# Patient Record
Sex: FEMALE | Race: WHITE | NOT HISPANIC OR LATINO | Employment: OTHER | URBAN - METROPOLITAN AREA
[De-identification: names, ages, dates, MRNs, and addresses within clinical notes are randomized per-mention and may not be internally consistent; named-entity substitution may affect disease eponyms.]

---

## 2017-01-03 ENCOUNTER — TELEPHONE (OUTPATIENT)
Dept: FAMILY MEDICINE CLINIC | Facility: CLINIC | Age: 57
End: 2017-01-03

## 2017-01-04 ENCOUNTER — HOSPITAL ENCOUNTER (EMERGENCY)
Facility: HOSPITAL | Age: 57
Discharge: HOME | End: 2017-01-04
Attending: EMERGENCY MEDICINE | Admitting: EMERGENCY MEDICINE

## 2017-01-04 ENCOUNTER — APPOINTMENT (OUTPATIENT)
Dept: GENERAL RADIOLOGY | Facility: HOSPITAL | Age: 57
End: 2017-01-04

## 2017-01-04 VITALS
BODY MASS INDEX: 37.21 KG/M2 | RESPIRATION RATE: 17 BRPM | SYSTOLIC BLOOD PRESSURE: 131 MMHG | TEMPERATURE: 98.3 F | OXYGEN SATURATION: 94 % | HEIGHT: 63 IN | DIASTOLIC BLOOD PRESSURE: 78 MMHG | HEART RATE: 70 BPM | WEIGHT: 210 LBS

## 2017-01-04 DIAGNOSIS — R07.89 ATYPICAL CHEST PAIN: Primary | ICD-10-CM

## 2017-01-04 DIAGNOSIS — K21.9 GASTROESOPHAGEAL REFLUX DISEASE, ESOPHAGITIS PRESENCE NOT SPECIFIED: ICD-10-CM

## 2017-01-04 DIAGNOSIS — E78.5 HYPERLIPIDEMIA, UNSPECIFIED HYPERLIPIDEMIA TYPE: ICD-10-CM

## 2017-01-04 DIAGNOSIS — R73.9 HYPERGLYCEMIA: Primary | ICD-10-CM

## 2017-01-04 LAB
ALBUMIN SERPL-MCNC: 4.6 G/DL (ref 3.5–5.2)
ALBUMIN/GLOB SERPL: 1.8 G/DL
ALP SERPL-CCNC: 84 U/L (ref 39–117)
ALT SERPL W P-5'-P-CCNC: 22 U/L (ref 1–33)
ANION GAP SERPL CALCULATED.3IONS-SCNC: 13.6 MMOL/L
AST SERPL-CCNC: 20 U/L (ref 1–32)
BASOPHILS # BLD AUTO: 0.04 10*3/MM3 (ref 0–0.2)
BASOPHILS NFR BLD AUTO: 0.5 % (ref 0–1.5)
BILIRUB SERPL-MCNC: 0.4 MG/DL (ref 0.1–1.2)
BUN BLD-MCNC: 17 MG/DL (ref 6–20)
BUN/CREAT SERPL: 18.1 (ref 7–25)
CALCIUM SPEC-SCNC: 9.6 MG/DL (ref 8.6–10.5)
CHLORIDE SERPL-SCNC: 100 MMOL/L (ref 98–107)
CO2 SERPL-SCNC: 26.4 MMOL/L (ref 22–29)
CREAT BLD-MCNC: 0.94 MG/DL (ref 0.57–1)
DEPRECATED RDW RBC AUTO: 44 FL (ref 37–54)
EOSINOPHIL # BLD AUTO: 0.22 10*3/MM3 (ref 0–0.7)
EOSINOPHIL NFR BLD AUTO: 3 % (ref 0.3–6.2)
ERYTHROCYTE [DISTWIDTH] IN BLOOD BY AUTOMATED COUNT: 13.4 % (ref 11.7–13)
GFR SERPL CREATININE-BSD FRML MDRD: 62 ML/MIN/1.73
GLOBULIN UR ELPH-MCNC: 2.5 GM/DL
GLUCOSE BLD-MCNC: 99 MG/DL (ref 65–99)
HCT VFR BLD AUTO: 48.8 % (ref 35.6–45.5)
HGB BLD-MCNC: 16.3 G/DL (ref 11.9–15.5)
IMM GRANULOCYTES # BLD: 0.02 10*3/MM3 (ref 0–0.03)
IMM GRANULOCYTES NFR BLD: 0.3 % (ref 0–0.5)
LYMPHOCYTES # BLD AUTO: 2.54 10*3/MM3 (ref 0.9–4.8)
LYMPHOCYTES NFR BLD AUTO: 34.2 % (ref 19.6–45.3)
MCH RBC QN AUTO: 30.2 PG (ref 26.9–32)
MCHC RBC AUTO-ENTMCNC: 33.4 G/DL (ref 32.4–36.3)
MCV RBC AUTO: 90.5 FL (ref 80.5–98.2)
MONOCYTES # BLD AUTO: 0.54 10*3/MM3 (ref 0.2–1.2)
MONOCYTES NFR BLD AUTO: 7.3 % (ref 5–12)
NEUTROPHILS # BLD AUTO: 4.06 10*3/MM3 (ref 1.9–8.1)
NEUTROPHILS NFR BLD AUTO: 54.7 % (ref 42.7–76)
PLATELET # BLD AUTO: 265 10*3/MM3 (ref 140–500)
PMV BLD AUTO: 11 FL (ref 6–12)
POTASSIUM BLD-SCNC: 4.3 MMOL/L (ref 3.5–5.2)
PROT SERPL-MCNC: 7.1 G/DL (ref 6–8.5)
RBC # BLD AUTO: 5.39 10*6/MM3 (ref 3.9–5.2)
SODIUM BLD-SCNC: 140 MMOL/L (ref 136–145)
TROPONIN T SERPL-MCNC: <0.01 NG/ML (ref 0–0.03)
WBC NRBC COR # BLD: 7.42 10*3/MM3 (ref 4.5–10.7)

## 2017-01-04 PROCEDURE — 93010 ELECTROCARDIOGRAM REPORT: CPT | Performed by: INTERNAL MEDICINE

## 2017-01-04 PROCEDURE — 99284 EMERGENCY DEPT VISIT MOD MDM: CPT

## 2017-01-04 PROCEDURE — 71020 HC CHEST PA AND LATERAL: CPT

## 2017-01-04 PROCEDURE — 93005 ELECTROCARDIOGRAM TRACING: CPT

## 2017-01-04 PROCEDURE — 84484 ASSAY OF TROPONIN QUANT: CPT | Performed by: EMERGENCY MEDICINE

## 2017-01-04 PROCEDURE — 80053 COMPREHEN METABOLIC PANEL: CPT | Performed by: EMERGENCY MEDICINE

## 2017-01-04 PROCEDURE — 85025 COMPLETE CBC W/AUTO DIFF WBC: CPT | Performed by: EMERGENCY MEDICINE

## 2017-01-04 RX ORDER — SODIUM CHLORIDE 0.9 % (FLUSH) 0.9 %
10 SYRINGE (ML) INJECTION AS NEEDED
Status: DISCONTINUED | OUTPATIENT
Start: 2017-01-04 | End: 2017-01-05 | Stop reason: HOSPADM

## 2017-01-04 RX ORDER — OMEPRAZOLE 40 MG/1
40 CAPSULE, DELAYED RELEASE ORAL DAILY
Qty: 30 CAPSULE | Refills: 0 | Status: SHIPPED | OUTPATIENT
Start: 2017-01-04 | End: 2017-04-18 | Stop reason: SDUPTHER

## 2017-01-04 RX ORDER — ASPIRIN 325 MG
325 TABLET ORAL ONCE
Status: DISCONTINUED | OUTPATIENT
Start: 2017-01-04 | End: 2017-01-04

## 2017-01-04 NOTE — TELEPHONE ENCOUNTER
Looked through chart, had glucose borderline at 107 and can screen for diabetes with diagnosis of hyperglycemia.  The a1c isn't going to relate to her feet being cold necessarily.  If prediabetic or diabetic can develop neuropathies, but usually burning, tingling and/or numbness.  She is also due for cmp, lipid profile diagnosis dyslipidemia.

## 2017-01-04 NOTE — TELEPHONE ENCOUNTER
Labs ordered. Pt informed to come in for nurse visit to have drawn. They have to be sent to [facility].

## 2017-01-05 LAB
HOLD SPECIMEN: NORMAL
WHOLE BLOOD HOLD SPECIMEN: NORMAL

## 2017-01-05 NOTE — ED PROVIDER NOTES
EMERGENCY DEPARTMENT ENCOUNTER    CHIEF COMPLAINT  Chief Complaint: CP  History given by: patient  History limited by: nothing  Room Number: 28/28  PMD: Carrillo Drake MD  GI- Dr. Peraza    HPI:  Pt is a 56 y.o. female who presents complaining of intermittent upper chest discomfort for the last 2-3 days. Pt states that the discomfort radiates up her neck into her right ear. Pt states that the discomfort is worse with breathing or swallowing. Pt also complains of trouble swallowing, SOB, cold feet and an intermittent hoarse voice but denies fever.     Duration:  2-3 days  Onset: gradual  Timing: intermittent  Location: upper chest  Radiation: right ear  Quality: tightness  Intensity/Severity: moderate  Progression: worsening  Associated Symptoms: trouble swallowing, SOB, hoarse voice  Aggravating Factors: breathing, swallowing  Alleviating Factors: none  Previous Episodes: none  Treatment before arrival: none    PAST MEDICAL HISTORY  Active Ambulatory Problems     Diagnosis Date Noted   • Fibromyalgia 04/22/2016   • Depression 04/22/2016   • Acquired hypothyroidism 04/22/2016   • Hyperlipidemia 04/22/2016   • Vitamin D deficiency 04/22/2016   • Antinuclear factor positive 08/28/2014   • Gait instability 08/28/2014   • Disorder of lipid metabolism 08/28/2014   • Pain 06/06/2014   • Autonomic neuropathy 05/16/2016   • Intractable migraine with aura without status migrainosus 05/16/2016   • Small fiber neuropathy 06/28/2016   • Chronic bilateral low back pain without sciatica 12/08/2016   • Chronic right shoulder pain 12/08/2016     Resolved Ambulatory Problems     Diagnosis Date Noted   • No Resolved Ambulatory Problems     Past Medical History   Diagnosis Date   • Allergic    • Anemia    • Anxiety    • Arthritis    • Cancer    • Eating disorder    • Fibromyalgia, primary    • GERD (gastroesophageal reflux disease)    • Headache    • Hypothyroidism    • Low back pain    • Obesity    • Peptic ulceration    •  Peripheral neuropathy    • Tremor    • Urinary tract infection    • Visual impairment        PAST SURGICAL HISTORY  Past Surgical History   Procedure Laterality Date   • Cervical conization     • Dilatation and curettage         FAMILY HISTORY  Family History   Problem Relation Age of Onset   • Lymphoma Mother    • Depression Mother    • Cancer Father    • Heart attack Father    • Psoriasis Father    • Alcohol abuse Father    • Aneurysm Brother    • COPD Brother    • Heart disease Brother    • Heart attack Brother    • Cancer Maternal Aunt    • Cancer Maternal Uncle    • Cancer Paternal Aunt    • Heart disease Paternal Aunt    • Cancer Paternal Uncle    • Heart disease Paternal Uncle    • Aneurysm Brother    • Lung disease Brother    • Alcohol abuse Brother    • ADD / ADHD Son        SOCIAL HISTORY  Social History     Social History   • Marital status:      Spouse name: N/A   • Number of children: N/A   • Years of education: N/A     Occupational History   • Not on file.     Social History Main Topics   • Smoking status: Former Smoker   • Smokeless tobacco: Never Used   • Alcohol use Yes      Comment: OCCASIONAL   • Drug use: No   • Sexual activity: Not on file     Other Topics Concern   • Not on file     Social History Narrative       ALLERGIES  Ampicillin and Flour    REVIEW OF SYSTEMS  Review of Systems   Constitutional: Negative for fever.   HENT: Positive for trouble swallowing and voice change (hoarse, intermittent). Negative for sore throat.    Eyes: Negative.    Respiratory: Positive for shortness of breath. Negative for cough.    Cardiovascular: Positive for chest pain.   Gastrointestinal: Negative for abdominal pain, diarrhea and vomiting.   Genitourinary: Negative for dysuria.   Musculoskeletal: Negative for neck pain.        Feet are cold   Skin: Negative for rash.   Allergic/Immunologic: Negative.    Neurological: Negative for weakness, numbness and headaches.   Hematological: Negative.     Psychiatric/Behavioral: Negative.    All other systems reviewed and are negative.      PHYSICAL EXAM  ED Triage Vitals   Temp Heart Rate Resp BP SpO2   01/04/17 2059 01/04/17 2059 01/04/17 2059 01/04/17 2113 01/04/17 2059   98.3 °F (36.8 °C) 85 18 126/94 94 %      Temp src Heart Rate Source Patient Position BP Location FiO2 (%)   01/04/17 2059 01/04/17 2059 -- -- --   Tympanic Monitor          Physical Exam   Constitutional: She is oriented to person, place, and time and well-developed, well-nourished, and in no distress. No distress.   HENT:   Head: Normocephalic and atraumatic.   Right Ear: Tympanic membrane normal.   Left Ear: Tympanic membrane normal.   Mouth/Throat: Posterior oropharyngeal erythema (mild) present.   Eyes: EOM are normal. Pupils are equal, round, and reactive to light.   Neck: Normal range of motion. Neck supple.   No crepitus   Cardiovascular: Normal rate, regular rhythm and normal heart sounds.    Pulses:       Dorsalis pedis pulses are 2+ on the right side, and 2+ on the left side.   Pulmonary/Chest: Effort normal and breath sounds normal. No respiratory distress.   Abdominal: Soft. There is no tenderness. There is no rebound and no guarding.   Musculoskeletal: Normal range of motion. She exhibits no edema.   Lymphadenopathy:     She has no cervical adenopathy.   Neurological: She is alert and oriented to person, place, and time. She has normal sensation and normal strength.   Skin: Skin is warm and dry. No rash noted.   Psychiatric: Mood and affect normal.   Nursing note and vitals reviewed.      LAB RESULTS  Lab Results (last 24 hours)     Procedure Component Value Units Date/Time    CBC & Differential [[id number]] Collected:  01/04/17 2122    Specimen:  Blood Updated:  01/04/17 2133    Narrative:       The following orders were created for panel order CBC & Differential.  Procedure                               Abnormality         Status                     ---------                                -----------         ------                     CBC Auto Differential[[id number]]         Abnormal            Final result                 Please view results for these tests on the individual orders.    Comprehensive Metabolic Panel [[id number]] Collected:  01/04/17 2122    Specimen:  Blood Updated:  01/04/17 2156     Glucose 99 mg/dL      BUN 17 mg/dL      Creatinine 0.94 mg/dL      Sodium 140 mmol/L      Potassium 4.3 mmol/L      Chloride 100 mmol/L      CO2 26.4 mmol/L      Calcium 9.6 mg/dL      Total Protein 7.1 g/dL      Albumin 4.60 g/dL      ALT (SGPT) 22 U/L      AST (SGOT) 20 U/L      Alkaline Phosphatase 84 U/L      Total Bilirubin 0.4 mg/dL      eGFR Non African Amer 62 mL/min/1.73      Globulin 2.5 gm/dL      A/G Ratio 1.8 g/dL      BUN/Creatinine Ratio 18.1      Anion Gap 13.6 mmol/L     Troponin [[id number]]  (Normal) Collected:  01/04/17 2122    Specimen:  Blood Updated:  01/04/17 2156     Troponin T <0.010 ng/mL     Narrative:       Troponin T Reference Ranges:  Less than 0.03 ng/mL:    Negative for AMI  0.03 to 0.09 ng/mL:      Indeterminant for AMI  Greater than 0.09 ng/mL: Positive for AMI    CBC Auto Differential [[id number]]  (Abnormal) Collected:  01/04/17 2122    Specimen:  Blood Updated:  01/04/17 2133     WBC 7.42 10*3/mm3      RBC 5.39 (H) 10*6/mm3      Hemoglobin 16.3 (H) g/dL      Hematocrit 48.8 (H) %      MCV 90.5 fL      MCH 30.2 pg      MCHC 33.4 g/dL      RDW 13.4 (H) %      RDW-SD 44.0 fl      MPV 11.0 fL      Platelets 265 10*3/mm3      Neutrophil % 54.7 %      Lymphocyte % 34.2 %      Monocyte % 7.3 %      Eosinophil % 3.0 %      Basophil % 0.5 %      Immature Grans % 0.3 %      Neutrophils, Absolute 4.06 10*3/mm3      Lymphocytes, Absolute 2.54 10*3/mm3      Monocytes, Absolute 0.54 10*3/mm3      Eosinophils, Absolute 0.22 10*3/mm3      Basophils, Absolute 0.04 10*3/mm3      Immature Grans, Absolute 0.02 10*3/mm3           I ordered the above labs and reviewed the  results    RADIOLOGY  XR Chest 2 View   Preliminary Result   No acute findings.                  2203- Reviewed pt's CXR, which shows nothing acute. Independently viewed by me. Interpreted by radiologist. Discussed with Dr. Diamond.    I ordered the above noted radiological studies. Interpreted by radiologist. Discussed with radiologist (Dr. Diamond). Reviewed by me in PACS.       PROCEDURES  Procedures  EKG           EKG time: 2103  Rhythm/Rate: NSR, 70  P waves and HI: normal  QRS, axis: normal   ST and T waves: normal     Interpreted Contemporaneously by me, independently viewed  No old for comparison      PROGRESS AND CONSULTS  ED Course     2242- Notified pt of her unremarkable labs, imaging and EKG. D/w pt that her history sounds like she will need an EGD to r/o esophagitis. Pt states that she has not had a prescription for omeprazole recently. Discussed the plan to discharge the pt home with a prescription for omeprazole and recommended that she follow up with GI. Pt agrees with the plan and all questions were addressed.    10:44 PM  Latest vital signs   BP- 126/94 HR- 85 Temp- 98.3 °F (36.8 °C) (Tympanic) O2 sat- 94%    MEDICAL DECISION MAKING  Results were reviewed/discussed with the patient and they were also made aware of online access. Pt also made aware that some labs, such as cultures, will not be resulted during ER visit and follow up with PMD is necessary.     MDM  Number of Diagnoses or Management Options  Atypical chest pain:   Gastroesophageal reflux disease, esophagitis presence not specified:      Amount and/or Complexity of Data Reviewed  Clinical lab tests: ordered and reviewed (Troponin=<0.010)  Tests in the radiology section of CPT®: ordered and reviewed (CXR shows nothing acute)  Tests in the medicine section of CPT®: ordered and reviewed (See EKG procedure note)  Discussion of test results with the performing providers: yes (D/w Dr. Diamond)  Independent visualization of images, tracings, or  specimens: yes           DIAGNOSIS  Final diagnoses:   Atypical chest pain   Gastroesophageal reflux disease, esophagitis presence not specified       DISPOSITION  DISCHARGE    Patient discharged in stable condition.    Reviewed implications of results, diagnosis, meds, responsibility to follow up, warning signs and symptoms of possible worsening, potential complications and reasons to return to ER, including fever, worsening pain or any concerns.    Patient/Family voiced understanding of above instructions.    Discussed plan for discharge, as there is no emergent indication for admission.  Pt/family is agreeable and understands need for follow up and repeat testing.  Pt is aware that discharge does not mean that nothing is wrong but it indicates no emergency is present that requires admission and they must continue care with follow-up as given below or physician of their choice.     FOLLOW-UP  Steve Peraza MD  [address] [facility] [address]  [place] [address]  [phone]    Schedule an appointment as soon as possible for a visit           Medication List      New Prescriptions          omeprazole 40 MG capsule   Commonly known as:  priLOSEC   Take 1 capsule by mouth Daily.         Changed          gabapentin 600 MG tablet   Commonly known as:  NEURONTIN   1 po am, 1 noon and 2 night   What changed:    - how much to take  - additional instructions         Stop          AFRIN NASAL SPRAY 0.05 % nasal spray   Generic drug:  oxymetazoline       chlorhexidine 0.12 % solution   Commonly known as:  PERIDEX       diclofenac 3 % gel gel   Commonly known as:  VOLTAREN       ibuprofen 800 MG tablet   Commonly known as:  ADVIL,MOTRIN       lidocaine 5 %   Commonly known as:  LIDODERM       RESTASIS 0.05 % ophthalmic emulsion   Generic drug:  cycloSPORINE               Latest Documented Vital Signs:  As of 10:49 PM  BP- 131/78 HR- 70 Temp- 98.3 °F (36.8 °C) (Tympanic) O2 sat- 94%    --  Documentation assistance provided by  elizabeth Hunter for Dr. Warren.  Information recorded by the scribe was done at my direction and has been verified and validated by me.       Le Hunter  01/04/17 9346       Olvin Warren MD  01/04/17 0366

## 2017-01-11 ENCOUNTER — OUTSIDE FACILITY SERVICE (OUTPATIENT)
Dept: PAIN MEDICINE | Facility: CLINIC | Age: 57
End: 2017-01-11

## 2017-01-11 PROCEDURE — 64495 INJ PARAVERT F JNT L/S 3 LEV: CPT | Performed by: PAIN MEDICINE

## 2017-01-11 PROCEDURE — 64494 INJ PARAVERT F JNT L/S 2 LEV: CPT | Performed by: PAIN MEDICINE

## 2017-01-11 PROCEDURE — 64493 INJ PARAVERT F JNT L/S 1 LEV: CPT | Performed by: PAIN MEDICINE

## 2017-02-08 ENCOUNTER — TELEPHONE (OUTPATIENT)
Dept: FAMILY MEDICINE CLINIC | Facility: CLINIC | Age: 57
End: 2017-02-08

## 2017-03-06 ENCOUNTER — TELEPHONE (OUTPATIENT)
Dept: NEUROLOGY | Facility: CLINIC | Age: 57
End: 2017-03-06

## 2017-03-23 ENCOUNTER — OFFICE VISIT (OUTPATIENT)
Dept: NEUROLOGY | Facility: CLINIC | Age: 57
End: 2017-03-23

## 2017-03-23 VITALS
DIASTOLIC BLOOD PRESSURE: 68 MMHG | HEIGHT: 63 IN | WEIGHT: 210 LBS | SYSTOLIC BLOOD PRESSURE: 124 MMHG | BODY MASS INDEX: 37.21 KG/M2

## 2017-03-23 DIAGNOSIS — R07.9 CHEST PAIN, UNSPECIFIED TYPE: ICD-10-CM

## 2017-03-23 DIAGNOSIS — G90.9 AUTONOMIC NEUROPATHY: ICD-10-CM

## 2017-03-23 DIAGNOSIS — G43.119 INTRACTABLE MIGRAINE WITH AURA WITHOUT STATUS MIGRAINOSUS: ICD-10-CM

## 2017-03-23 PROCEDURE — 99214 OFFICE O/P EST MOD 30 MIN: CPT | Performed by: PSYCHIATRY & NEUROLOGY

## 2017-03-23 RX ORDER — ALPRAZOLAM 0.5 MG/1
0.5 TABLET ORAL
COMMUNITY
End: 2017-07-14

## 2017-03-23 RX ORDER — DULOXETIN HYDROCHLORIDE 60 MG/1
60 CAPSULE, DELAYED RELEASE ORAL
COMMUNITY
End: 2017-06-13

## 2017-03-23 RX ORDER — OMEPRAZOLE 40 MG/1
40 CAPSULE, DELAYED RELEASE ORAL
COMMUNITY
End: 2019-05-02

## 2017-03-24 ENCOUNTER — OFFICE VISIT (OUTPATIENT)
Dept: CARDIOLOGY | Facility: CLINIC | Age: 57
End: 2017-03-24

## 2017-03-24 VITALS
WEIGHT: 210 LBS | BODY MASS INDEX: 37.2 KG/M2 | SYSTOLIC BLOOD PRESSURE: 143 MMHG | DIASTOLIC BLOOD PRESSURE: 92 MMHG | HEART RATE: 80 BPM

## 2017-03-24 DIAGNOSIS — E78.2 MIXED HYPERLIPIDEMIA: Primary | ICD-10-CM

## 2017-03-24 DIAGNOSIS — E03.9 ACQUIRED HYPOTHYROIDISM: ICD-10-CM

## 2017-03-24 DIAGNOSIS — F32.89 OTHER DEPRESSION: ICD-10-CM

## 2017-03-24 DIAGNOSIS — R07.2 PRECORDIAL PAIN: ICD-10-CM

## 2017-03-24 DIAGNOSIS — M79.7 FIBROMYALGIA: ICD-10-CM

## 2017-03-24 PROCEDURE — 99204 OFFICE O/P NEW MOD 45 MIN: CPT | Performed by: INTERNAL MEDICINE

## 2017-03-24 PROCEDURE — 93000 ELECTROCARDIOGRAM COMPLETE: CPT | Performed by: INTERNAL MEDICINE

## 2017-03-24 NOTE — PROGRESS NOTES
[facility]  Cardiology Consult Note  .  Patient Identification:  Name: Gabi Tomas  Age: 57 y.o.  Sex: female  :  1960  MRN: [id number]       2017      Requesting Physician: Yoselin  Reason for Consultation: Chest pain    Chief Complaint   Patient presents with   • Hyperlipidemia     HPI     57 year old female with past history of fibromyalgia, genaralize aches, diagnosed with autonomic neuropathy at [facility] with orthostatic hypotension. No syncope.  Her ECG shows sinus rhythm.  She gets chest pains on and off during the day and at night time they get worse. The pain is not severe , doesn't radiate and she feels short of breath all the time. No swelling in the legs. She walks with a cane due to neuropathy in the legs.     Past Medical History:  Past Medical History:   Diagnosis Date   • Allergic    • Anemia    • Anxiety    • Arthritis    • Cancer    • Depression    • Eating disorder    • Fibromyalgia, primary    • GERD (gastroesophageal reflux disease)    • Headache    • Hyperlipidemia    • Hypothyroidism    • Low back pain    • Obesity    • Peptic ulceration    • Peripheral neuropathy    • Tremor    • Urinary tract infection    • Visual impairment      Past Surgical History:  Past Surgical History:   Procedure Laterality Date   • CERVICAL CONIZATION     • DILATATION AND CURETTAGE        Home Meds:    (Not in a hospital admission)  Current Meds:     Current Outpatient Prescriptions:   •  ALPRAZolam (XANAX) 0.5 MG tablet, Take 0.5 mg by mouth., Disp: , Rfl:   •  DULoxetine (CYMBALTA) 60 MG capsule, Take 60 mg by mouth., Disp: , Rfl:   •  gabapentin (NEURONTIN) 600 MG tablet, 1 po am, 1 noon and 2 night (Patient taking differently: 800 mg. 1 po am, 1 noon and 2 night), Disp: 120 tablet, Rfl: 5  •  HYDROcodone-acetaminophen (NORCO) 5-325 MG per tablet, Take 1 tablet by mouth Every 8 (Eight) Hours As Needed for moderate pain (4-6)., Disp: 90 tablet, Rfl: 0  •  ibuprofen  "(ADVIL,MOTRIN) 800 MG tablet, TK 1 T PO BID PRN, Disp: , Rfl: 1  •  levothyroxine (SYNTHROID) 25 MCG tablet, Take 25 mcg by mouth., Disp: , Rfl:   •  meloxicam (MOBIC) 15 MG tablet, , Disp: , Rfl:   •  omeprazole (priLOSEC) 40 MG capsule, Take 1 capsule by mouth Daily., Disp: 30 capsule, Rfl: 0  •  omeprazole (priLOSEC) 40 MG capsule, Take 40 mg by mouth., Disp: , Rfl:   •  pilocarpine (SALAGEN) 5 MG tablet, Take 5 mg by mouth., Disp: , Rfl:   •  SEA SOFT NASAL MIST 0.65 % nasal spray, SPRAY INTO NOSE TID, Disp: , Rfl: 12  •  solifenacin (VESICARE) 5 MG tablet, Take 5 mg by mouth., Disp: , Rfl:   •  Unable to find, 1 each 1 (One) Time. Pt reports that she puts lidocaine drops on her hands and rubs it in waits a half hr and washes it off then puts a compound cream on hands, Disp: , Rfl:   •  vitamin D (ERGOCALCIFEROL) 90670 UNITS capsule capsule, TAKE 1 CAPSULE BY MOUTH EVERY 7 DAYS, Disp: 12 capsule, Rfl: 1  Allergies:  Allergies   Allergen Reactions   • Ampicillin Anaphylaxis and Rash   • Milnacipran Other (See Comments)     Palpitations, rectal bleeding   • Cyclobenzaprine Other (See Comments)     \"made me sleep for 2 days\"   • Flour    • Levofloxacin Other (See Comments)   • Savella  [Milnacipran Hcl] Other (See Comments)   • Pregabalin Palpitations     Social History:   Social History   Substance Use Topics   • Smoking status: Former Smoker   • Smokeless tobacco: Never Used   • Alcohol use Yes      Comment: OCCASIONAL      Family History:  Family History   Problem Relation Age of Onset   • Lymphoma Mother    • Depression Mother    • Cancer Father    • Heart attack Father    • Psoriasis Father    • Alcohol abuse Father    • Aneurysm Brother    • COPD Brother    • Heart disease Brother    • Heart attack Brother    • Cancer Maternal Aunt    • Cancer Maternal Uncle    • Cancer Paternal Aunt    • Heart disease Paternal Aunt    • Cancer Paternal Uncle    • Heart disease Paternal Uncle    • Aneurysm Brother    • Lung " disease Brother    • Alcohol abuse Brother    • ADD / ADHD Son       Review of Systems   Constitution: Positive for weakness and malaise/fatigue. Negative for diaphoresis and fever.   HENT: Positive for headaches.    Cardiovascular: Positive for chest pain, irregular heartbeat, near-syncope and palpitations. Negative for leg swelling.   Respiratory: Positive for shortness of breath and snoring.    Skin: Negative for color change.   Musculoskeletal: Positive for arthritis, back pain and joint pain. Negative for neck pain.   Gastrointestinal: Positive for abdominal pain and nausea. Negative for bowel incontinence and vomiting.   Genitourinary: Positive for bladder incontinence.   Neurological: Positive for dizziness, focal weakness, light-headedness, loss of balance and numbness. Negative for vertigo.   Psychiatric/Behavioral: Positive for altered mental status, depression and memory loss.      Al the other Ros are stable  Objective:  /92  Pulse 80  Wt 210 lb (95.3 kg)  BMI 37.2 kg/m2  Exam:  Physical Exam    General: No acute distress, well developed, well nourished    Skin: Warm and dry, no diaphoresis noted; well hydrated; no rash; no pallor or cyanosis   HEENT: Normal Pupills; no conjunctiva erythema; external ear and nose normal; oral mucosa moist, no xanthelasma, lips not cyanotic   Neck: Supple; no carotid bruits; no JVP; thyroid not enlarged. Trahea mid line    Heart: S1S2 regular rate and rhythm; soft systolic murmurs, no rubs or gallops are auscultated.   Chest: Respirations regular, unlabored at rest, bilateral breath sounds have good air entry throughout all lung fields; no crackles, rubs or wheezes auscultated.     Abdomen: Soft, non-tender, non-distended, positive bowel sounds, no organomegaly   Extremities: Bilateral lower extremities have no pre-tibial pitting edema; Femoral pulses are palpable   Musculoskeletal:  Moves all extremities well; no deformities; no tenderness; no clubbing of the  fingers   Neurological/  Psychological:  Alert and oriented x 3; no new  motor deficits; speech and behavior appropriate; depressed    Rest of the exam is stable       ECG 12 Lead  Date/Time: 3/24/2017 12:58 PM  Performed by: ANALI JENNINGS  Authorized by: ANALI JENNINGS   Previous ECG: no previous ECG available  Rhythm: sinus rhythm  Rate: normal  QRS axis: right            Comparison to previous ECG:  Similar to  previous ecg    Assessment:    Problem List Items Addressed This Visit        Cardiovascular and Mediastinum    Hyperlipidemia - Primary       Endocrine    Acquired hypothyroidism       Nervous and Auditory    Chest pain       Musculoskeletal and Integument    Fibromyalgia       Other    Depression          Recommendations:    Overall events noted. Her chest pain is more atypical than typical. Her ecg no acute change. I will obtain an echo and cardiolyte stress test to evaluate her symptoms. Her othostatic hypotension on the 10 min tilt was not too bad. She has fibromyalgia.        I not only counseled the patient today on the risk factor modification of significant factors noted in the assessment and plan, and I also recommended that the patient reduce salt and saturated animal fat intake in diet, about the advantages of plant based diet, as well as to perform scheduled exercise on a regular basis.    Anali Jennings MD  3/24/2017, 12:58 PM

## 2017-04-18 ENCOUNTER — HOSPITAL ENCOUNTER (OUTPATIENT)
Dept: CARDIOLOGY | Facility: HOSPITAL | Age: 57
Discharge: HOME | End: 2017-04-18
Attending: INTERNAL MEDICINE

## 2017-04-18 ENCOUNTER — HOSPITAL ENCOUNTER (OUTPATIENT)
Dept: CARDIOLOGY | Facility: HOSPITAL | Age: 57
Discharge: HOME | End: 2017-04-18
Attending: INTERNAL MEDICINE | Admitting: INTERNAL MEDICINE

## 2017-04-18 VITALS
HEART RATE: 67 BPM | SYSTOLIC BLOOD PRESSURE: 108 MMHG | RESPIRATION RATE: 20 BRPM | DIASTOLIC BLOOD PRESSURE: 80 MMHG | OXYGEN SATURATION: 97 %

## 2017-04-18 VITALS
DIASTOLIC BLOOD PRESSURE: 92 MMHG | SYSTOLIC BLOOD PRESSURE: 143 MMHG | BODY MASS INDEX: 37.21 KG/M2 | WEIGHT: 210 LBS | HEIGHT: 63 IN

## 2017-04-18 DIAGNOSIS — E03.9 ACQUIRED HYPOTHYROIDISM: ICD-10-CM

## 2017-04-18 DIAGNOSIS — M79.7 FIBROMYALGIA: ICD-10-CM

## 2017-04-18 DIAGNOSIS — F32.89 OTHER DEPRESSION: ICD-10-CM

## 2017-04-18 DIAGNOSIS — E78.2 MIXED HYPERLIPIDEMIA: ICD-10-CM

## 2017-04-18 DIAGNOSIS — R07.2 PRECORDIAL PAIN: ICD-10-CM

## 2017-04-18 PROCEDURE — 0399T HC MYOCARDL STRAIN IMAG QUAN ASSMT PER SESS: CPT

## 2017-04-18 PROCEDURE — 25010000002 REGADENOSON 0.4 MG/5ML SOLUTION: Performed by: INTERNAL MEDICINE

## 2017-04-18 PROCEDURE — 78452 HT MUSCLE IMAGE SPECT MULT: CPT

## 2017-04-18 PROCEDURE — 78452 HT MUSCLE IMAGE SPECT MULT: CPT | Performed by: INTERNAL MEDICINE

## 2017-04-18 PROCEDURE — A9500 TC99M SESTAMIBI: HCPCS | Performed by: INTERNAL MEDICINE

## 2017-04-18 PROCEDURE — 93018 CV STRESS TEST I&R ONLY: CPT | Performed by: INTERNAL MEDICINE

## 2017-04-18 PROCEDURE — 93016 CV STRESS TEST SUPVJ ONLY: CPT | Performed by: INTERNAL MEDICINE

## 2017-04-18 PROCEDURE — 93017 CV STRESS TEST TRACING ONLY: CPT

## 2017-04-18 PROCEDURE — 93306 TTE W/DOPPLER COMPLETE: CPT | Performed by: INTERNAL MEDICINE

## 2017-04-18 PROCEDURE — 93306 TTE W/DOPPLER COMPLETE: CPT

## 2017-04-18 PROCEDURE — 0 TECHNETIUM SESTAMIBI: Performed by: INTERNAL MEDICINE

## 2017-04-18 RX ADMIN — Medication 1 DOSE: at 08:09

## 2017-04-18 RX ADMIN — REGADENOSON 0.4 MG: 0.08 INJECTION, SOLUTION INTRAVENOUS at 11:12

## 2017-04-18 RX ADMIN — Medication 1 DOSE: at 11:12

## 2017-04-21 LAB
BH CV ECHO MEAS - ACS: 2.2 CM
BH CV ECHO MEAS - AO MAX PG (FULL): 2.6 MMHG
BH CV ECHO MEAS - AO MAX PG: 6.3 MMHG
BH CV ECHO MEAS - AO MEAN PG (FULL): 2 MMHG
BH CV ECHO MEAS - AO MEAN PG: 4 MMHG
BH CV ECHO MEAS - AO ROOT AREA (BSA CORRECTED): 1.5
BH CV ECHO MEAS - AO ROOT AREA: 7.1 CM^2
BH CV ECHO MEAS - AO ROOT DIAM: 3 CM
BH CV ECHO MEAS - AO V2 MAX: 125 CM/SEC
BH CV ECHO MEAS - AO V2 MEAN: 91.6 CM/SEC
BH CV ECHO MEAS - AO V2 VTI: 28.2 CM
BH CV ECHO MEAS - AVA(I,A): 2.7 CM^2
BH CV ECHO MEAS - AVA(I,D): 2.7 CM^2
BH CV ECHO MEAS - AVA(V,A): 2.9 CM^2
BH CV ECHO MEAS - AVA(V,D): 2.9 CM^2
BH CV ECHO MEAS - BSA(HAYCOCK): 2.1 M^2
BH CV ECHO MEAS - BSA: 2 M^2
BH CV ECHO MEAS - BZI_BMI: 37.2 KILOGRAMS/M^2
BH CV ECHO MEAS - BZI_METRIC_HEIGHT: 160 CM
BH CV ECHO MEAS - BZI_METRIC_WEIGHT: 95.3 KG
BH CV ECHO MEAS - CONTRAST EF 4CH: 58.5 ML/M^2
BH CV ECHO MEAS - EDV(CUBED): 64 ML
BH CV ECHO MEAS - EDV(MOD-SP4): 53 ML
BH CV ECHO MEAS - EDV(TEICH): 70 ML
BH CV ECHO MEAS - EF(CUBED): 65.7 %
BH CV ECHO MEAS - EF(MOD-SP4): 58.5 %
BH CV ECHO MEAS - EF(TEICH): 57.8 %
BH CV ECHO MEAS - ESV(CUBED): 22 ML
BH CV ECHO MEAS - ESV(MOD-SP4): 22 ML
BH CV ECHO MEAS - ESV(TEICH): 29.6 ML
BH CV ECHO MEAS - FS: 30 %
BH CV ECHO MEAS - IVS/LVPW: 1
BH CV ECHO MEAS - IVSD: 1.1 CM
BH CV ECHO MEAS - LA DIMENSION: 4.3 CM
BH CV ECHO MEAS - LA/AO: 1.4
BH CV ECHO MEAS - LAT PEAK E' VEL: 8.5 CM/SEC
BH CV ECHO MEAS - LV DIASTOLIC VOL/BSA (35-75): 26.8 ML/M^2
BH CV ECHO MEAS - LV MASS(C)D: 145.6 GRAMS
BH CV ECHO MEAS - LV MASS(C)DI: 73.8 GRAMS/M^2
BH CV ECHO MEAS - LV MAX PG: 3.6 MMHG
BH CV ECHO MEAS - LV MEAN PG: 2 MMHG
BH CV ECHO MEAS - LV SYSTOLIC VOL/BSA (12-30): 11.1 ML/M^2
BH CV ECHO MEAS - LV V1 MAX: 95.3 CM/SEC
BH CV ECHO MEAS - LV V1 MEAN: 66.9 CM/SEC
BH CV ECHO MEAS - LV V1 VTI: 20 CM
BH CV ECHO MEAS - LVIDD: 4 CM
BH CV ECHO MEAS - LVIDS: 2.8 CM
BH CV ECHO MEAS - LVLD AP4: 7 CM
BH CV ECHO MEAS - LVLS AP4: 6.3 CM
BH CV ECHO MEAS - LVOT AREA (M): 3.8 CM^2
BH CV ECHO MEAS - LVOT AREA: 3.8 CM^2
BH CV ECHO MEAS - LVOT DIAM: 2.2 CM
BH CV ECHO MEAS - LVPWD: 1.1 CM
BH CV ECHO MEAS - MED PEAK E' VEL: 6.3 CM/SEC
BH CV ECHO MEAS - MV A DUR: 0.16 SEC
BH CV ECHO MEAS - MV A MAX VEL: 74.7 CM/SEC
BH CV ECHO MEAS - MV DEC SLOPE: 248 CM/SEC^2
BH CV ECHO MEAS - MV DEC TIME: 0.21 SEC
BH CV ECHO MEAS - MV E MAX VEL: 59.7 CM/SEC
BH CV ECHO MEAS - MV E/A: 0.8
BH CV ECHO MEAS - MV MAX PG: 2.1 MMHG
BH CV ECHO MEAS - MV MEAN PG: 1 MMHG
BH CV ECHO MEAS - MV P1/2T MAX VEL: 59.7 CM/SEC
BH CV ECHO MEAS - MV P1/2T: 70.5 MSEC
BH CV ECHO MEAS - MV V2 MAX: 71.7 CM/SEC
BH CV ECHO MEAS - MV V2 MEAN: 42.1 CM/SEC
BH CV ECHO MEAS - MV V2 VTI: 18.2 CM
BH CV ECHO MEAS - MVA P1/2T LCG: 3.7 CM^2
BH CV ECHO MEAS - MVA(P1/2T): 3.1 CM^2
BH CV ECHO MEAS - MVA(VTI): 4.2 CM^2
BH CV ECHO MEAS - PA MAX PG (FULL): 1.2 MMHG
BH CV ECHO MEAS - PA MAX PG: 2.5 MMHG
BH CV ECHO MEAS - PA V2 MAX: 78.7 CM/SEC
BH CV ECHO MEAS - PULM A REVS DUR: 0.14 SEC
BH CV ECHO MEAS - PULM A REVS VEL: 24.7 CM/SEC
BH CV ECHO MEAS - PULM DIAS VEL: 31 CM/SEC
BH CV ECHO MEAS - PULM S/D: 1.6
BH CV ECHO MEAS - PULM SYS VEL: 48.5 CM/SEC
BH CV ECHO MEAS - PVA(V,A): 2.7 CM^2
BH CV ECHO MEAS - PVA(V,D): 2.7 CM^2
BH CV ECHO MEAS - QP/QS: 0.73
BH CV ECHO MEAS - RV MAX PG: 1.2 MMHG
BH CV ECHO MEAS - RV MEAN PG: 1 MMHG
BH CV ECHO MEAS - RV V1 MAX: 55.4 CM/SEC
BH CV ECHO MEAS - RV V1 MEAN: 42.8 CM/SEC
BH CV ECHO MEAS - RV V1 VTI: 14.5 CM
BH CV ECHO MEAS - RVDD: 2.2 CM
BH CV ECHO MEAS - RVOT AREA: 3.8 CM^2
BH CV ECHO MEAS - RVOT DIAM: 2.2 CM
BH CV ECHO MEAS - SI(AO): 101 ML/M^2
BH CV ECHO MEAS - SI(CUBED): 21.3 ML/M^2
BH CV ECHO MEAS - SI(LVOT): 38.5 ML/M^2
BH CV ECHO MEAS - SI(MOD-SP4): 15.7 ML/M^2
BH CV ECHO MEAS - SI(TEICH): 20.5 ML/M^2
BH CV ECHO MEAS - SV(AO): 199.3 ML
BH CV ECHO MEAS - SV(CUBED): 42 ML
BH CV ECHO MEAS - SV(LVOT): 76 ML
BH CV ECHO MEAS - SV(MOD-SP4): 31 ML
BH CV ECHO MEAS - SV(RVOT): 55.1 ML
BH CV ECHO MEAS - SV(TEICH): 40.4 ML
BH CV ECHO MEAS - TAPSE (>1.6): 1.7 CM2
BH CV XLRA - RV BASE: 2.8 CM
BH CV XLRA - RV LENGTH: 6.3 CM
BH CV XLRA - RV MID: 2.6 CM
BH CV XLRA - TDI S': 9.3 CM/SEC
LEFT ATRIUM VOLUME INDEX: 24.3 ML/M2
LV EF 2D ECHO EST: 60 %

## 2017-04-25 ENCOUNTER — TELEPHONE (OUTPATIENT)
Dept: CARDIOLOGY | Facility: CLINIC | Age: 57
End: 2017-04-25

## 2017-04-25 NOTE — TELEPHONE ENCOUNTER
----- Message from Bronson Garcia sent at 4/24/2017 11:48 AM EDT -----  Regarding: Test Results  Contact: [phone]  Please call patient with Echo and Stress Test results

## 2017-04-26 ENCOUNTER — TELEPHONE (OUTPATIENT)
Dept: CARDIOLOGY | Facility: CLINIC | Age: 57
End: 2017-04-26

## 2017-04-26 NOTE — TELEPHONE ENCOUNTER
----- Message from Lauryn Evans sent at 4/21/2017 10:46 AM EDT -----  Regarding: CLEARANCE FOR EGD SHE HAS AN APPT ON 5/5 TO SEE [facility]  [facility]/DR HEMA MARTINS [phone]  CLEARANCE FOR EGD-NOT SCHEDULED YET  FAX [phone]      Per Dr Jennings pt can proceed with EGD.   Sent clearance

## 2017-04-28 ENCOUNTER — TELEPHONE (OUTPATIENT)
Dept: NEUROLOGY | Facility: CLINIC | Age: 57
End: 2017-04-28

## 2017-05-01 LAB
BH CV STRESS BP STAGE 1: NORMAL
BH CV STRESS COMMENTS STAGE 1: NORMAL
BH CV STRESS DOSE REGADENOSON STAGE 1: 0.4
BH CV STRESS DURATION MIN STAGE 1: 0
BH CV STRESS DURATION SEC STAGE 1: 15
BH CV STRESS HR STAGE 1: 76
BH CV STRESS PROTOCOL 1: NORMAL
BH CV STRESS RECOVERY BP: NORMAL MMHG
BH CV STRESS RECOVERY HR: 77 BPM
BH CV STRESS RECOVERY O2: 98 %
BH CV STRESS STAGE 1: 1
LV EF NUC BP: 80 %
MAXIMAL PREDICTED HEART RATE: 165 BPM
PERCENT MAX PREDICTED HR: 46.06 %
STRESS BASELINE BP: NORMAL MMHG
STRESS BASELINE HR: 67 BPM
STRESS O2 SAT REST: 97 %
STRESS PERCENT HR: 54 %
STRESS POST ESTIMATED WORKLOAD: 1 METS
STRESS POST EXERCISE DUR MIN: 0 MIN
STRESS POST EXERCISE DUR SEC: 0 SEC
STRESS POST O2 SAT PEAK: 98 %
STRESS POST PEAK BP: NORMAL MMHG
STRESS POST PEAK HR: 76 BPM
STRESS TARGET HR: 140 BPM

## 2017-05-16 RX ORDER — LEVOTHYROXINE SODIUM 0.03 MG/1
TABLET ORAL
Qty: 90 TABLET | Refills: 0 | Status: SHIPPED | OUTPATIENT
Start: 2017-05-16 | End: 2017-05-22

## 2017-05-16 RX ORDER — ERGOCALCIFEROL 1.25 MG/1
CAPSULE ORAL
Qty: 12 CAPSULE | Refills: 0 | Status: SHIPPED | OUTPATIENT
Start: 2017-05-16 | End: 2018-04-13

## 2017-05-22 ENCOUNTER — OFFICE VISIT (OUTPATIENT)
Dept: PAIN MEDICINE | Facility: CLINIC | Age: 57
End: 2017-05-22

## 2017-05-22 VITALS
HEIGHT: 63 IN | RESPIRATION RATE: 16 BRPM | DIASTOLIC BLOOD PRESSURE: 88 MMHG | HEART RATE: 85 BPM | WEIGHT: 215.4 LBS | SYSTOLIC BLOOD PRESSURE: 126 MMHG | OXYGEN SATURATION: 96 % | BODY MASS INDEX: 38.16 KG/M2 | TEMPERATURE: 97.4 F

## 2017-05-22 DIAGNOSIS — G89.29 CHRONIC BILATERAL LOW BACK PAIN WITHOUT SCIATICA: ICD-10-CM

## 2017-05-22 DIAGNOSIS — M43.06 LUMBAR SPONDYLOLYSIS: Primary | ICD-10-CM

## 2017-05-22 DIAGNOSIS — M54.50 CHRONIC BILATERAL LOW BACK PAIN WITHOUT SCIATICA: ICD-10-CM

## 2017-05-22 DIAGNOSIS — G62.9 SMALL FIBER NEUROPATHY: ICD-10-CM

## 2017-05-22 DIAGNOSIS — G90.9 AUTONOMIC NEUROPATHY: ICD-10-CM

## 2017-05-22 PROCEDURE — 99214 OFFICE O/P EST MOD 30 MIN: CPT | Performed by: PAIN MEDICINE

## 2017-05-22 RX ORDER — MELOXICAM 15 MG/1
15 TABLET ORAL DAILY PRN
Qty: 30 TABLET | Refills: 1 | Status: SHIPPED | OUTPATIENT
Start: 2017-05-22 | End: 2017-09-14 | Stop reason: ALTCHOICE

## 2017-05-25 ENCOUNTER — TELEPHONE (OUTPATIENT)
Dept: PAIN MEDICINE | Facility: CLINIC | Age: 57
End: 2017-05-25

## 2017-06-12 ENCOUNTER — OFFICE VISIT (OUTPATIENT)
Dept: CARDIOLOGY | Facility: CLINIC | Age: 57
End: 2017-06-12

## 2017-06-12 VITALS
HEART RATE: 77 BPM | SYSTOLIC BLOOD PRESSURE: 118 MMHG | BODY MASS INDEX: 38.26 KG/M2 | WEIGHT: 216 LBS | DIASTOLIC BLOOD PRESSURE: 82 MMHG

## 2017-06-12 DIAGNOSIS — G62.9 SMALL FIBER NEUROPATHY: ICD-10-CM

## 2017-06-12 DIAGNOSIS — R07.2 PRECORDIAL PAIN: ICD-10-CM

## 2017-06-12 DIAGNOSIS — G90.9 AUTONOMIC NEUROPATHY: Primary | ICD-10-CM

## 2017-06-12 PROCEDURE — 99213 OFFICE O/P EST LOW 20 MIN: CPT | Performed by: INTERNAL MEDICINE

## 2017-06-12 NOTE — PROGRESS NOTES
Procedure   [facility]  Cardiology Progress Note    Patient Identification:  Name:Gabi Tomas  Age:57 y.o.  Sex: female  :  1960  MRN: [id number]           2017    Subjective:    Chief Complaint   Patient presents with   • Chest Pain       HPI     57 year old female with past history of fibromyalgia, genaralize aches, diagnosed with autonomic neuropathy at [facility] with orthostatic hypotension. No syncope. Her ECG shows sinus rhythm. She gets chest pains on and off during the day and at night time they get worse. The pain is not severe , doesn't radiate and she feels short of breath all the time. No swelling in the legs. She walks with a cane due to neuropathy in the legs.     Today she says that her chest pains are better after taking prilosec and esophageal dialatation. Her cardiolyte stress test has been normal and echo showed normal EF, her activity is limited. She does have some issues with swallowing. Her chest pains are atypical for  Angiina. She does have symptoms of autonomic neuropathy    Review of Systems   Constitution: Positive for diaphoresis and weakness. Negative for fever.   HENT: Positive for headaches.    Cardiovascular: Positive for chest pain and near-syncope. Negative for palpitations.   Respiratory: Negative for shortness of breath.    Musculoskeletal: Positive for back pain. Negative for neck pain.   Gastrointestinal: Positive for nausea. Negative for abdominal pain, bowel incontinence and vomiting.   Genitourinary: Positive for bladder incontinence.   Neurological: Positive for dizziness, focal weakness, light-headedness and loss of balance. Negative for vertigo.   Psychiatric/Behavioral: Positive for altered mental status and memory loss.       The following portions of the patient's history were reviewed and updated as appropriate: allergies, current medications, past family history, past medical history, past social history,and problem list.    Past  Medical History:   Diagnosis Date   • Allergic    • Anemia    • Anxiety    • Arthritis    • Cancer    • Depression    • Eating disorder    • Fibromyalgia, primary    • GERD (gastroesophageal reflux disease)    • Headache    • Hyperlipidemia    • Hypothyroidism    • Low back pain    • Obesity    • Peptic ulceration    • Peripheral neuropathy    • Tremor    • Urinary tract infection    • Visual impairment        Past Surgical History:   Procedure Laterality Date   • CERVICAL CONIZATION     • DILATATION AND CURETTAGE         Social History     Social History   • Marital status:      Spouse name: N/A   • Number of children: N/A   • Years of education: N/A     Occupational History   • Not on file.     Social History Main Topics   • Smoking status: Former Smoker     Packs/day: 0.50     Years: 42.00     Types: Cigarettes     Quit date: 10/1/2016   • Smokeless tobacco: Never Used   • Alcohol use Yes      Comment: OCCASIONAL   • Drug use: No   • Sexual activity: Defer     Other Topics Concern   • Not on file     Social History Narrative       Family History   Problem Relation Age of Onset   • Lymphoma Mother    • Depression Mother    • Cancer Father    • Heart attack Father    • Psoriasis Father    • Alcohol abuse Father    • Aneurysm Brother    • COPD Brother    • Heart disease Brother    • Heart attack Brother    • Cancer Maternal Aunt    • Cancer Maternal Uncle    • Cancer Paternal Aunt    • Heart disease Paternal Aunt    • Cancer Paternal Uncle    • Heart disease Paternal Uncle    • Aneurysm Brother    • Lung disease Brother    • Alcohol abuse Brother    • ADD / ADHD Son        Scheduled Meds:    Current Outpatient Prescriptions:   •  ALPRAZolam (XANAX) 0.5 MG tablet, Take 0.5 mg by mouth., Disp: , Rfl:   •  DULoxetine (CYMBALTA) 60 MG capsule, Take 60 mg by mouth., Disp: , Rfl:   •  gabapentin (NEURONTIN) 600 MG tablet, 1 po am, 1 noon and 2 night (Patient taking differently: 800 mg. 1 po am, 1 noon and 2 night),  Disp: 120 tablet, Rfl: 5  •  HYDROcodone-acetaminophen (NORCO) 5-325 MG per tablet, Take 1 tablet by mouth Every 8 (Eight) Hours As Needed for moderate pain (4-6)., Disp: 90 tablet, Rfl: 0  •  levothyroxine (SYNTHROID) 25 MCG tablet, Take 25 mcg by mouth., Disp: , Rfl:   •  meloxicam (MOBIC) 15 MG tablet, Take 1 tablet by mouth Daily As Needed (pain)., Disp: 30 tablet, Rfl: 1  •  omeprazole (priLOSEC) 40 MG capsule, Take 40 mg by mouth., Disp: , Rfl:   •  pilocarpine (SALAGEN) 5 MG tablet, Take 5 mg by mouth., Disp: , Rfl:   •  SEA SOFT NASAL MIST 0.65 % nasal spray, SPRAY INTO NOSE TID, Disp: , Rfl: 12  •  solifenacin (VESICARE) 5 MG tablet, Take 5 mg by mouth., Disp: , Rfl:   •  vitamin D (ERGOCALCIFEROL) 44618 UNITS capsule capsule, TAKE 1 CAPSULE BY MOUTH EVERY 7 DAYS, Disp: 12 capsule, Rfl: 0    Objective:  /82  Pulse 77  Wt 216 lb (98 kg)  BMI 38.26 kg/m2     Physical Exam  Physical Exam:    General: No acute distress.    Skin: Warm and dry, no diaphoresis noted   HEENT: No ptosis; external ear and nose normal; oral mucosa moist   Neck: Supple; no carotid bruits; no JVD, Trachea mid line   Heart: S1S2 regular rate and rhythm; no murmurs; no gallop or rub appreciated, apex not displaced   Chest: Respirations regular, unlabored at rest, bilateral breath sounds have good air entry; no  wheezes auscultated.     Abdomen: Soft, non-tender, non-distended, positive bowel sounds  No hepatosplenomegaly   Extremities: Bilateral lower extremities have no pre-tibial pitting edema; Radials are palpable   Neurological: Alert and oriented x 3; no new motor deficits,         Procedures   Comparison to previous ECG:  Similar to previous ecg     Assessment:  Problem List Items Addressed This Visit     Autonomic neuropathy - Primary    Small fiber neuropathy    Chest pain          Plan:    Overall cardiac wise she is doing ok. She says that her cholesterol is high, was palced on statins but she decided not take them as  her family told her not to take it. I did reassured her that the benefits outweigh much of the risks, and she will be at the increased riks of stroke or MI without statin. Weight loss is reemphasized    I did ask her to see her family doctor to have her lipid panel rechecked and treated appropriately depending on the LDL. Cardiac wise as her symptoms got better and as her stress test is ok, to continue the medical Rx. I gave her vascular screening leaf let      I not only counseled the patient today on the risk factor modification of significant factors noted in the assessment and plan, and I also recommended that the patient reduce salt and saturated animal fat intake in diet, about the advantages of plant based diet, as well as to perform scheduled exercise on a regular basis.    06/12/2017  Perfecto Jennings MD, FACC

## 2017-06-13 ENCOUNTER — OFFICE VISIT (OUTPATIENT)
Dept: FAMILY MEDICINE CLINIC | Facility: CLINIC | Age: 57
End: 2017-06-13

## 2017-06-13 VITALS
BODY MASS INDEX: 38.45 KG/M2 | OXYGEN SATURATION: 96 % | DIASTOLIC BLOOD PRESSURE: 80 MMHG | WEIGHT: 217 LBS | TEMPERATURE: 98.1 F | SYSTOLIC BLOOD PRESSURE: 114 MMHG | HEART RATE: 71 BPM | HEIGHT: 63 IN

## 2017-06-13 DIAGNOSIS — M79.7 FIBROMYALGIA: ICD-10-CM

## 2017-06-13 DIAGNOSIS — F32.89 OTHER DEPRESSION: ICD-10-CM

## 2017-06-13 DIAGNOSIS — G90.9 AUTONOMIC NEUROPATHY: ICD-10-CM

## 2017-06-13 DIAGNOSIS — E78.49 OTHER HYPERLIPIDEMIA: ICD-10-CM

## 2017-06-13 DIAGNOSIS — L85.8 KERATOSIS PILARIS: ICD-10-CM

## 2017-06-13 DIAGNOSIS — Z79.899 DRUG THERAPY: ICD-10-CM

## 2017-06-13 DIAGNOSIS — J30.1 SEASONAL ALLERGIC RHINITIS DUE TO POLLEN: ICD-10-CM

## 2017-06-13 DIAGNOSIS — R53.83 OTHER FATIGUE: ICD-10-CM

## 2017-06-13 DIAGNOSIS — R07.89 ATYPICAL CHEST PAIN: Primary | ICD-10-CM

## 2017-06-13 DIAGNOSIS — E03.9 ACQUIRED HYPOTHYROIDISM: ICD-10-CM

## 2017-06-13 PROCEDURE — 99214 OFFICE O/P EST MOD 30 MIN: CPT | Performed by: FAMILY MEDICINE

## 2017-06-13 RX ORDER — ESCITALOPRAM OXALATE 10 MG/1
TABLET ORAL
Qty: 30 TABLET | Refills: 5 | Status: SHIPPED | OUTPATIENT
Start: 2017-06-13 | End: 2017-07-14

## 2017-06-13 RX ORDER — FEXOFENADINE HCL 180 MG/1
180 TABLET ORAL DAILY
Qty: 30 TABLET | Refills: 12 | Status: SHIPPED | OUTPATIENT
Start: 2017-06-13

## 2017-06-13 RX ORDER — DULOXETIN HYDROCHLORIDE 30 MG/1
CAPSULE, DELAYED RELEASE ORAL
Qty: 15 CAPSULE | Refills: 0 | Status: SHIPPED | OUTPATIENT
Start: 2017-06-13 | End: 2017-07-14

## 2017-06-13 RX ORDER — DULOXETIN HYDROCHLORIDE 20 MG/1
CAPSULE, DELAYED RELEASE ORAL
Qty: 15 CAPSULE | Refills: 0 | Status: SHIPPED | OUTPATIENT
Start: 2017-06-13 | End: 2017-07-14

## 2017-06-13 RX ORDER — AZELASTINE 1 MG/ML
2 SPRAY, METERED NASAL 2 TIMES DAILY
Qty: 30 ML | Refills: 12 | Status: SHIPPED | OUTPATIENT
Start: 2017-06-13 | End: 2018-03-15

## 2017-06-13 NOTE — PROGRESS NOTES
Subjective   Gabi Tomas is a 57 y.o. female. Presents today for   Chief Complaint   Patient presents with   • Illness     pt has been sick for about 10 days. sore throat, losing voice.   • Rash     pt has rash on her lower legs   • Hot Flashes     pt has been getting over heated, and feels like she is going to pass out, and then she gets stomach cramps, nausea and diarrhea. This happens about twice a week.       Illness   Associated symptoms include chest pain, diaphoresis, fatigue, headaches, nausea, a rash, a visual change and weakness. Pertinent negatives include no abdominal pain, fever, neck pain, vertigo or vomiting.   Rash   Associated symptoms include fatigue. Pertinent negatives include no fever, shortness of breath or vomiting.   Neurologic Problem   The patient's primary symptoms include an altered mental status, clumsiness, focal sensory loss, focal weakness, a loss of balance, memory loss, near-syncope, a visual change and weakness. The patient's pertinent negatives include no slurred speech or syncope. This is a chronic problem. The current episode started more than 1 year ago. The neurological problem developed suddenly. The problem has been rapidly worsening since onset. There was facial, left-sided, right-sided, lower extremity and upper extremity focality noted. Associated symptoms include an auditory change, an aura, back pain, bladder incontinence, chest pain, confusion, diaphoresis, dizziness, fatigue, headaches, light-headedness and nausea. Pertinent negatives include no abdominal pain, bowel incontinence, fever, neck pain, palpitations, shortness of breath, vertigo or vomiting. Past treatments include acetaminophen, bed rest, drinking, medication, position change and sleep. The treatment provided mild relief.     Patient saw cardiology yesterday and c/o chest pain improved on omeprazole per notes.  She has past history of fibromyalgia, genaralize aches, diagnosed with autonomic neuropathy  at [facility] with orthostatic hypotension.      We discussed possible side effects of medications, but doesn't take xanax very often;  On cymbalta, notes difference if misses dose in depression;  On lexapro in past with better relief.    Fibromyalgia - lyrica heart racing;  savella - had rectal bleeding after.  Switched to gabapentin. Using HC, last fill 10/2016.     Cardiology would like send lipids again.    Review of Systems   Constitutional: Positive for diaphoresis and fatigue. Negative for fever.   Respiratory: Negative for shortness of breath.    Cardiovascular: Positive for chest pain and near-syncope. Negative for palpitations.   Gastrointestinal: Positive for nausea. Negative for abdominal pain, bowel incontinence and vomiting.   Genitourinary: Positive for bladder incontinence.   Musculoskeletal: Positive for back pain. Negative for neck pain.   Skin: Positive for rash.   Neurological: Positive for dizziness, focal weakness, weakness, light-headedness, headaches and loss of balance. Negative for vertigo and syncope.   Psychiatric/Behavioral: Positive for confusion and memory loss.       The following portions of the patient's history were reviewed and updated as appropriate: allergies, current medications, past medical history and problem list.    Patient Active Problem List   Diagnosis   • Fibromyalgia   • Depression   • Acquired hypothyroidism   • Hyperlipidemia   • Vitamin D deficiency   • Antinuclear factor positive   • Gait instability   • Disorder of lipid metabolism   • Pain   • Autonomic neuropathy   • Intractable migraine with aura without status migrainosus   • Small fiber neuropathy   • Chronic bilateral low back pain without sciatica   • Chronic right shoulder pain   • Chest pain   • Lumbar spondylolysis       Allergies   Allergen Reactions   • Ampicillin Anaphylaxis and Rash   • Milnacipran Other (See Comments)     Palpitations, rectal bleeding   • Cyclobenzaprine Other (See Comments)  "    \"made me sleep for 2 days\"   • Levofloxacin Other (See Comments)   • Savella  [Milnacipran Hcl] Other (See Comments)   • Pregabalin Palpitations       Current Outpatient Prescriptions on File Prior to Visit   Medication Sig Dispense Refill   • ALPRAZolam (XANAX) 0.5 MG tablet Take 0.5 mg by mouth.     • DULoxetine (CYMBALTA) 60 MG capsule Take 60 mg by mouth.     • gabapentin (NEURONTIN) 600 MG tablet 1 po am, 1 noon and 2 night (Patient taking differently: 800 mg. 1 po am, 1 noon and 2 night) 120 tablet 5   • HYDROcodone-acetaminophen (NORCO) 5-325 MG per tablet Take 1 tablet by mouth Every 8 (Eight) Hours As Needed for moderate pain (4-6). 90 tablet 0   • levothyroxine (SYNTHROID) 25 MCG tablet Take 25 mcg by mouth.     • meloxicam (MOBIC) 15 MG tablet Take 1 tablet by mouth Daily As Needed (pain). 30 tablet 1   • omeprazole (priLOSEC) 40 MG capsule Take 40 mg by mouth.     • pilocarpine (SALAGEN) 5 MG tablet Take 5 mg by mouth.     • SEA SOFT NASAL MIST 0.65 % nasal spray SPRAY INTO NOSE TID  12   • solifenacin (VESICARE) 5 MG tablet Take 5 mg by mouth.     • vitamin D (ERGOCALCIFEROL) 83026 UNITS capsule capsule TAKE 1 CAPSULE BY MOUTH EVERY 7 DAYS 12 capsule 0     No current facility-administered medications on file prior to visit.        Objective   Vitals:    06/13/17 0743   BP: 114/80   BP Location: Right arm   Patient Position: Sitting   Cuff Size: Large Adult   Pulse: 71   Temp: 98.1 °F (36.7 °C)   TempSrc: Oral   SpO2: 96%   Weight: 217 lb (98.4 kg)   Height: 63\" (160 cm)       Physical Exam   Constitutional: She appears well-developed and well-nourished.   HENT:   Head: Normocephalic and atraumatic.   Neck: Neck supple. No JVD present. No thyromegaly present.   Cardiovascular: Normal rate, regular rhythm and normal heart sounds.  Exam reveals no gallop and no friction rub.    No murmur heard.  Pulmonary/Chest: Effort normal and breath sounds normal. No respiratory distress. She has no wheezes. She " has no rales.   Abdominal: Soft. Bowel sounds are normal. She exhibits no distension. There is no tenderness. There is no rebound and no guarding.   Musculoskeletal: She exhibits no edema.   Neurological: She is alert.   Skin: Skin is warm and dry.   Keratosis pilaris noted on arms and legs;  Small red, flat macules on feet.   Psychiatric: She has a normal mood and affect. Her behavior is normal.   Nursing note and vitals reviewed.      Assessment/Plan   Gabi was seen today for illness, rash and hot flashes.    Diagnoses and all orders for this visit:    Atypical chest pain  -     Magnesium    Acquired hypothyroidism    Other hyperlipidemia  -     Comprehensive Metabolic Panel  -     Lipid Panel    Fibromyalgia    Other depression  -     DULoxetine (CYMBALTA) 30 MG capsule; 1 po daily x 15 days, then go to 20mg  -     DULoxetine (CYMBALTA) 20 MG capsule; 1 po daily x 15 days then stop  -     escitalopram (LEXAPRO) 10 MG tablet; 1/2 po daily x15 days, then 1 po daily    Autonomic neuropathy    Other fatigue  -     Magnesium  -     CBC & Differential  -     TSH  -     T4, Free  -     T3, Free  -     Vitamin B12    Drug therapy  -     Comprehensive Metabolic Panel  -     Lipid Panel  -     Magnesium  -     CBC & Differential  -     TSH  -     T4, Free  -     T3, Free  -     Vitamin B12    Seasonal allergic rhinitis due to pollen  -     azelastine (ASTELIN) 0.1 % nasal spray; 2 sprays into each nostril 2 (Two) Times a Day. Use in each nostril as directed    -consider tramadol next month in lieu of HC;  Will wait as chanign medications;    -would like see counselor no money yet  -having vascular studies done for carotids, aorta and legs  ? Petechiae feet - see dermatology;  Legs look classic keratosis pilaris, try OTC lac hydrin, would avoid topical steroids as only temporary relief         -Follow up: 4 weeks       Current Outpatient Prescriptions:   •  ALPRAZolam (XANAX) 0.5 MG tablet, Take 0.5 mg by mouth., Disp: ,  Rfl:   •  DULoxetine (CYMBALTA) 60 MG capsule, Take 60 mg by mouth., Disp: , Rfl:   •  gabapentin (NEURONTIN) 600 MG tablet, 1 po am, 1 noon and 2 night (Patient taking differently: 800 mg. 1 po am, 1 noon and 2 night), Disp: 120 tablet, Rfl: 5  •  HYDROcodone-acetaminophen (NORCO) 5-325 MG per tablet, Take 1 tablet by mouth Every 8 (Eight) Hours As Needed for moderate pain (4-6)., Disp: 90 tablet, Rfl: 0  •  levothyroxine (SYNTHROID) 25 MCG tablet, Take 25 mcg by mouth., Disp: , Rfl:   •  meloxicam (MOBIC) 15 MG tablet, Take 1 tablet by mouth Daily As Needed (pain)., Disp: 30 tablet, Rfl: 1  •  omeprazole (priLOSEC) 40 MG capsule, Take 40 mg by mouth., Disp: , Rfl:   •  pilocarpine (SALAGEN) 5 MG tablet, Take 5 mg by mouth., Disp: , Rfl:   •  SEA SOFT NASAL MIST 0.65 % nasal spray, SPRAY INTO NOSE TID, Disp: , Rfl: 12  •  solifenacin (VESICARE) 5 MG tablet, Take 5 mg by mouth., Disp: , Rfl:   •  vitamin D (ERGOCALCIFEROL) 41014 UNITS capsule capsule, TAKE 1 CAPSULE BY MOUTH EVERY 7 DAYS, Disp: 12 capsule, Rfl: 0

## 2017-06-14 LAB
ALBUMIN SERPL-MCNC: 4.3 G/DL (ref 3.5–5.5)
ALBUMIN/GLOB SERPL: 2.2 {RATIO} (ref 1.2–2.2)
ALP SERPL-CCNC: 80 IU/L (ref 39–117)
ALT SERPL-CCNC: 22 IU/L (ref 0–32)
AST SERPL-CCNC: 20 IU/L (ref 0–40)
BASOPHILS # BLD AUTO: 0 X10E3/UL (ref 0–0.2)
BASOPHILS NFR BLD AUTO: 1 %
BILIRUB SERPL-MCNC: 0.3 MG/DL (ref 0–1.2)
BUN SERPL-MCNC: 11 MG/DL (ref 6–24)
BUN/CREAT SERPL: 14 (ref 9–23)
CALCIUM SERPL-MCNC: 9.6 MG/DL (ref 8.7–10.2)
CHLORIDE SERPL-SCNC: 105 MMOL/L (ref 96–106)
CHOLEST SERPL-MCNC: 286 MG/DL (ref 100–199)
CO2 SERPL-SCNC: 21 MMOL/L (ref 18–29)
CREAT SERPL-MCNC: 0.79 MG/DL (ref 0.57–1)
EOSINOPHIL # BLD AUTO: 0.3 X10E3/UL (ref 0–0.4)
EOSINOPHIL NFR BLD AUTO: 5 %
ERYTHROCYTE [DISTWIDTH] IN BLOOD BY AUTOMATED COUNT: 14 % (ref 12.3–15.4)
GLOBULIN SER CALC-MCNC: 2 G/DL (ref 1.5–4.5)
GLUCOSE SERPL-MCNC: 107 MG/DL (ref 65–99)
HCT VFR BLD AUTO: 45.9 % (ref 34–46.6)
HDLC SERPL-MCNC: 72 MG/DL
HGB BLD-MCNC: 15.3 G/DL (ref 11.1–15.9)
IMM GRANULOCYTES # BLD: 0 X10E3/UL (ref 0–0.1)
IMM GRANULOCYTES NFR BLD: 0 %
LDLC SERPL CALC-MCNC: 184 MG/DL (ref 0–99)
LYMPHOCYTES # BLD AUTO: 1.9 X10E3/UL (ref 0.7–3.1)
LYMPHOCYTES NFR BLD AUTO: 32 %
MAGNESIUM SERPL-MCNC: 2.3 MG/DL (ref 1.6–2.3)
MCH RBC QN AUTO: 29.3 PG (ref 26.6–33)
MCHC RBC AUTO-ENTMCNC: 33.3 G/DL (ref 31.5–35.7)
MCV RBC AUTO: 88 FL (ref 79–97)
MONOCYTES # BLD AUTO: 0.3 X10E3/UL (ref 0.1–0.9)
MONOCYTES NFR BLD AUTO: 5 %
NEUTROPHILS # BLD AUTO: 3.5 X10E3/UL (ref 1.4–7)
NEUTROPHILS NFR BLD AUTO: 57 %
PLATELET # BLD AUTO: 283 X10E3/UL (ref 150–379)
POTASSIUM SERPL-SCNC: 4.5 MMOL/L (ref 3.5–5.2)
PROT SERPL-MCNC: 6.3 G/DL (ref 6–8.5)
RBC # BLD AUTO: 5.22 X10E6/UL (ref 3.77–5.28)
SODIUM SERPL-SCNC: 143 MMOL/L (ref 134–144)
T3FREE SERPL-MCNC: 2.8 PG/ML (ref 2–4.4)
T4 FREE SERPL-MCNC: 1.01 NG/DL (ref 0.82–1.77)
TRIGL SERPL-MCNC: 148 MG/DL (ref 0–149)
TSH SERPL DL<=0.005 MIU/L-ACNC: 4.2 UIU/ML (ref 0.45–4.5)
VIT B12 SERPL-MCNC: 529 PG/ML (ref 211–946)
VLDLC SERPL CALC-MCNC: 30 MG/DL (ref 5–40)
WBC # BLD AUTO: 6 X10E3/UL (ref 3.4–10.8)

## 2017-06-14 NOTE — PROGRESS NOTES
Call and mail copy of results to patient.  Kidney, liver and thyroid function normal.  B12 normal  Magnesium normal  Cholesterol high, work on diet.  Blood sugar just borderline, work on diabetic diet to prevent progression to diabetes.  Will monitor via labs every 6 to 12 months.

## 2017-06-26 ENCOUNTER — TELEPHONE (OUTPATIENT)
Dept: FAMILY MEDICINE CLINIC | Facility: CLINIC | Age: 57
End: 2017-06-26

## 2017-06-27 ENCOUNTER — TELEPHONE (OUTPATIENT)
Dept: FAMILY MEDICINE CLINIC | Facility: CLINIC | Age: 57
End: 2017-06-27

## 2017-06-27 NOTE — TELEPHONE ENCOUNTER
Pt informed she has not had hydrocodone for a while and dr. Lucas said try tramadol 50mg one daily #30 x 1 refill and she wants to go back on cymbalta 60mg 1 daily #30 x 1 refill .  Both called to gaby at [facility]

## 2017-07-13 DIAGNOSIS — M54.50 CHRONIC BILATERAL LOW BACK PAIN WITHOUT SCIATICA: ICD-10-CM

## 2017-07-13 DIAGNOSIS — G89.29 CHRONIC BILATERAL LOW BACK PAIN WITHOUT SCIATICA: ICD-10-CM

## 2017-07-13 DIAGNOSIS — M43.06 LUMBAR SPONDYLOLYSIS: Primary | ICD-10-CM

## 2017-07-14 ENCOUNTER — OFFICE VISIT (OUTPATIENT)
Dept: FAMILY MEDICINE CLINIC | Facility: CLINIC | Age: 57
End: 2017-07-14

## 2017-07-14 VITALS
TEMPERATURE: 97.5 F | WEIGHT: 215 LBS | HEART RATE: 102 BPM | SYSTOLIC BLOOD PRESSURE: 118 MMHG | BODY MASS INDEX: 38.09 KG/M2 | OXYGEN SATURATION: 98 % | DIASTOLIC BLOOD PRESSURE: 80 MMHG

## 2017-07-14 DIAGNOSIS — G90.9 AUTONOMIC NEUROPATHY: ICD-10-CM

## 2017-07-14 DIAGNOSIS — M43.06 LUMBAR SPONDYLOLYSIS: ICD-10-CM

## 2017-07-14 DIAGNOSIS — M79.7 FIBROMYALGIA: Primary | ICD-10-CM

## 2017-07-14 PROCEDURE — 99214 OFFICE O/P EST MOD 30 MIN: CPT | Performed by: FAMILY MEDICINE

## 2017-07-14 RX ORDER — TOPIRAMATE 50 MG/1
1 CAPSULE, EXTENDED RELEASE ORAL DAILY
Qty: 30 CAPSULE | Refills: 5 | Status: SHIPPED | OUTPATIENT
Start: 2017-07-14 | End: 2017-09-14 | Stop reason: ALTCHOICE

## 2017-07-14 RX ORDER — DULOXETIN HYDROCHLORIDE 60 MG/1
60 CAPSULE, DELAYED RELEASE ORAL 2 TIMES DAILY
COMMUNITY
End: 2017-07-14 | Stop reason: SDUPTHER

## 2017-07-14 RX ORDER — DULOXETIN HYDROCHLORIDE 60 MG/1
60 CAPSULE, DELAYED RELEASE ORAL 2 TIMES DAILY
Qty: 180 CAPSULE | Refills: 1 | Status: SHIPPED | OUTPATIENT
Start: 2017-07-14 | End: 2018-02-01 | Stop reason: SDUPTHER

## 2017-07-14 RX ORDER — GABAPENTIN 600 MG/1
1200 TABLET ORAL 3 TIMES DAILY
Qty: 180 TABLET | Refills: 5 | Status: SHIPPED | OUTPATIENT
Start: 2017-07-14 | End: 2017-07-17 | Stop reason: SDUPTHER

## 2017-07-14 NOTE — PROGRESS NOTES
Subjective   Gabi Tomas is a 57 y.o. female. Presents today for   Chief Complaint   Patient presents with   • Follow-up     med check couldn't take lexapro so stopped and went back on cymbalta 60mg bid.  Ultram didn't work and wants another rx for hydrocodone.   Having alot of tremors and problems with heat and dizziness.  Has positional hypotension and has been having heart flutters.       Neurologic Problem   The patient's primary symptoms include an altered mental status, clumsiness, focal sensory loss, focal weakness, a loss of balance, memory loss, near-syncope, a visual change and weakness. The patient's pertinent negatives include no slurred speech or syncope. This is a chronic problem. The current episode started more than 1 month ago. The neurological problem developed suddenly. There was facial, left-sided, right-sided, lower extremity and upper extremity focality noted. Associated symptoms include abdominal pain, an auditory change, an aura, back pain, bladder incontinence, chest pain, confusion, diaphoresis, dizziness, fatigue, headaches, nausea and shortness of breath. Pertinent negatives include no bowel incontinence, fever, light-headedness, neck pain, palpitations, vertigo or vomiting. Past treatments include bed rest, drinking, eating, medication, position change and sleep. The treatment provided mild relief.   Patient with chronic wide spread symptoms and complaints similar to past appts, unchanged.  Has chronic back pain as well, but over shadowed by wide spread pain; Migraines (centered ocular) back.  Has taken tramadol for pain, but no relief;  Has had 1 Rx HC in past 12 months, still has some and requesting refills.  Patient didn't tolerate lyrica, savella in past.  Has been on topamax in past, but didn't tolerate.  Reports neurologist told her leaving practice and requests referral to [facility], [place].      Review of Systems   Constitutional: Positive for diaphoresis and fatigue. Negative for fever.  "  Respiratory: Positive for shortness of breath.    Cardiovascular: Positive for chest pain and near-syncope. Negative for palpitations.   Gastrointestinal: Positive for abdominal pain and nausea. Negative for bowel incontinence and vomiting.   Genitourinary: Positive for bladder incontinence.   Musculoskeletal: Positive for back pain. Negative for neck pain.   Neurological: Positive for dizziness, focal weakness, weakness, headaches and loss of balance. Negative for vertigo, syncope and light-headedness.   Psychiatric/Behavioral: Positive for confusion and memory loss.       The following portions of the patient's history were reviewed and updated as appropriate: allergies, current medications, past medical history and problem list.    Patient Active Problem List   Diagnosis   • Fibromyalgia   • Depression   • Acquired hypothyroidism   • Hyperlipidemia   • Vitamin D deficiency   • Antinuclear factor positive   • Gait instability   • Disorder of lipid metabolism   • Pain   • Autonomic neuropathy   • Intractable migraine with aura without status migrainosus   • Small fiber neuropathy   • Chronic bilateral low back pain without sciatica   • Chronic right shoulder pain   • Chest pain   • Lumbar spondylolysis       Allergies   Allergen Reactions   • Ampicillin Anaphylaxis and Rash   • Milnacipran Other (See Comments)     Palpitations, rectal bleeding   • Cyclobenzaprine Other (See Comments)     \"made me sleep for 2 days\"   • Levofloxacin Other (See Comments)   • Savella  [Milnacipran Hcl] Other (See Comments)   • Pregabalin Palpitations       Current Outpatient Prescriptions on File Prior to Visit   Medication Sig Dispense Refill   • ALPRAZolam (XANAX) 0.5 MG tablet Take 0.5 mg by mouth.     • azelastine (ASTELIN) 0.1 % nasal spray 2 sprays into each nostril 2 (Two) Times a Day. Use in each nostril as directed 30 mL 12   • fexofenadine (ALLEGRA) 180 MG tablet Take 1 tablet by mouth Daily. 30 tablet 12   • gabapentin " (NEURONTIN) 600 MG tablet 1 po am, 1 noon and 2 night (Patient taking differently: 800 mg. 1 po am, 1 noon and 2 night) 120 tablet 5   • levothyroxine (SYNTHROID) 25 MCG tablet Take 25 mcg by mouth.     • meloxicam (MOBIC) 15 MG tablet Take 1 tablet by mouth Daily As Needed (pain). 30 tablet 1   • omeprazole (priLOSEC) 40 MG capsule Take 40 mg by mouth.     • pilocarpine (SALAGEN) 5 MG tablet Take 5 mg by mouth.     • SEA SOFT NASAL MIST 0.65 % nasal spray SPRAY INTO NOSE TID  12   • solifenacin (VESICARE) 5 MG tablet Take 5 mg by mouth.     • vitamin D (ERGOCALCIFEROL) 18602 UNITS capsule capsule TAKE 1 CAPSULE BY MOUTH EVERY 7 DAYS 12 capsule 0   • [DISCONTINUED] DULoxetine (CYMBALTA) 20 MG capsule 1 po daily x 15 days then stop 15 capsule 0   • [DISCONTINUED] DULoxetine (CYMBALTA) 30 MG capsule 1 po daily x 15 days, then go to 20mg 15 capsule 0   • [DISCONTINUED] escitalopram (LEXAPRO) 10 MG tablet 1/2 po daily x15 days, then 1 po daily 30 tablet 5     No current facility-administered medications on file prior to visit.        Objective   Vitals:    07/14/17 1305   BP: 118/80   Pulse: 102   Temp: 97.5 °F (36.4 °C)   SpO2: 98%   Weight: 215 lb (97.5 kg)       Physical Exam   Constitutional: She is oriented to person, place, and time. She appears well-developed and well-nourished.   Neurological: She is alert and oriented to person, place, and time.   Skin: Skin is warm and dry.   Psychiatric: She has a normal mood and affect. Her behavior is normal.   Nursing note and vitals reviewed.      Assessment/Plan   Gabi was seen today for follow-up.    Diagnoses and all orders for this visit:    Fibromyalgia  -     Cancel: Ambulatory Referral to Pain Management  -     Ambulatory Referral to Pain Management  -     Topiramate ER (TROKENDI XR) 50 MG capsule sustained-release 24 hr; Take 1 capsule by mouth Daily.    Lumbar spondylolysis  -     Cancel: Ambulatory Referral to Pain Management  -     Ambulatory Referral to Pain  Management  -     Topiramate ER (TROKENDI XR) 50 MG capsule sustained-release 24 hr; Take 1 capsule by mouth Daily.    Autonomic neuropathy  -     Ambulatory Referral to Neurology    Other orders  -     DULoxetine (CYMBALTA) 60 MG capsule; Take 1 capsule by mouth 2 (Two) Times a Day.    -will try trokendi for migraine and pain;  Gave samples.  -will refer to pain mgmt;  Has had 1 Rx of HC lasted for 9 months, discussed due to rules, regulations and standard of care changes, i will have to defer Rx of Schedule II narcotics for non-cncer pain to them, will refer to new pain mgmt, as reports too far to drive to [place].  -reviewed mami       -Follow up: 6 months       Current Outpatient Prescriptions:   •  ALPRAZolam (XANAX) 0.5 MG tablet, Take 0.5 mg by mouth., Disp: , Rfl:   •  azelastine (ASTELIN) 0.1 % nasal spray, 2 sprays into each nostril 2 (Two) Times a Day. Use in each nostril as directed, Disp: 30 mL, Rfl: 12  •  DULoxetine (CYMBALTA) 60 MG capsule, Take 1 capsule by mouth 2 (Two) Times a Day., Disp: 180 capsule, Rfl: 1  •  fexofenadine (ALLEGRA) 180 MG tablet, Take 1 tablet by mouth Daily., Disp: 30 tablet, Rfl: 12  •  gabapentin (NEURONTIN) 600 MG tablet, 1 po am, 1 noon and 2 night (Patient taking differently: 800 mg. 1 po am, 1 noon and 2 night), Disp: 120 tablet, Rfl: 5  •  levothyroxine (SYNTHROID) 25 MCG tablet, Take 25 mcg by mouth., Disp: , Rfl:   •  meloxicam (MOBIC) 15 MG tablet, Take 1 tablet by mouth Daily As Needed (pain)., Disp: 30 tablet, Rfl: 1  •  omeprazole (priLOSEC) 40 MG capsule, Take 40 mg by mouth., Disp: , Rfl:   •  pilocarpine (SALAGEN) 5 MG tablet, Take 5 mg by mouth., Disp: , Rfl:   •  SEA SOFT NASAL MIST 0.65 % nasal spray, SPRAY INTO NOSE TID, Disp: , Rfl: 12  •  solifenacin (VESICARE) 5 MG tablet, Take 5 mg by mouth., Disp: , Rfl:   •  vitamin D (ERGOCALCIFEROL) 26784 UNITS capsule capsule, TAKE 1 CAPSULE BY MOUTH EVERY 7 DAYS, Disp: 12 capsule, Rfl: 0

## 2017-07-17 RX ORDER — GABAPENTIN 600 MG/1
TABLET ORAL
Qty: 180 TABLET | Refills: 1
Start: 2017-07-17 | End: 2022-04-26 | Stop reason: SDUPTHER

## 2017-07-24 ENCOUNTER — TELEPHONE (OUTPATIENT)
Dept: NEUROLOGY | Facility: CLINIC | Age: 57
End: 2017-07-24

## 2017-08-08 DIAGNOSIS — G62.9 NEUROPATHY: Primary | ICD-10-CM

## 2017-08-08 DIAGNOSIS — R51.9 NONINTRACTABLE HEADACHE, UNSPECIFIED CHRONICITY PATTERN, UNSPECIFIED HEADACHE TYPE: ICD-10-CM

## 2017-08-09 ENCOUNTER — TELEPHONE (OUTPATIENT)
Dept: CARDIOLOGY | Facility: CLINIC | Age: 57
End: 2017-08-09

## 2017-08-09 NOTE — TELEPHONE ENCOUNTER
----- Message from Darryn Jennings MD sent at 7/27/2017 11:16 PM EDT -----   Normal except mild carotid plaque, to keep LDL low and ask her to talk to her family MD  ----- Message -----     From: Interface, Scans Incoming     Sent: 7/24/2017   2:59 PM       To: Darryn Jennings,    S/w pt gave results and instructions

## 2017-09-14 ENCOUNTER — OFFICE VISIT (OUTPATIENT)
Dept: CARDIOLOGY | Facility: CLINIC | Age: 57
End: 2017-09-14

## 2017-09-14 VITALS
BODY MASS INDEX: 38.62 KG/M2 | DIASTOLIC BLOOD PRESSURE: 64 MMHG | WEIGHT: 218 LBS | HEART RATE: 75 BPM | SYSTOLIC BLOOD PRESSURE: 87 MMHG

## 2017-09-14 DIAGNOSIS — R00.2 PALPITATIONS: ICD-10-CM

## 2017-09-14 DIAGNOSIS — R55 NEAR SYNCOPE: Primary | ICD-10-CM

## 2017-09-14 DIAGNOSIS — R07.2 PRECORDIAL PAIN: ICD-10-CM

## 2017-09-14 PROCEDURE — 99213 OFFICE O/P EST LOW 20 MIN: CPT | Performed by: INTERNAL MEDICINE

## 2017-09-14 PROCEDURE — 93000 ELECTROCARDIOGRAM COMPLETE: CPT | Performed by: INTERNAL MEDICINE

## 2017-09-14 RX ORDER — HYDROCODONE BITARTRATE AND ACETAMINOPHEN 5; 325 MG/1; MG/1
1 TABLET ORAL EVERY 6 HOURS PRN
COMMUNITY
End: 2017-10-06

## 2017-09-14 NOTE — PROGRESS NOTES
Subjective:       Gabi Tomas is a 57 y.o. female who here for follow up    CC  Syncope  HPI  57-year-old white female underwent near syncopal episode last week lasted for few minutes with no complete loss of consciousness without any chest pains or tightness in chest, patient recently had a cardiac workup done including the stress test and echocardiogram which are normal     Problem List Items Addressed This Visit        Nervous and Auditory    Chest pain      Other Visit Diagnoses     Near syncope    -  Primary    Palpitations            .    The following portions of the patient's history were reviewed and updated as appropriate: allergies, current medications, past family history, past medical history, past social history, past surgical history and problem list.    Past Medical History:   Diagnosis Date   • Allergic    • Anemia    • Anxiety    • Arthritis    • Cancer    • Depression    • Eating disorder    • Fibromyalgia, primary    • GERD (gastroesophageal reflux disease)    • Headache    • Hyperlipidemia    • Hypothyroidism    • Low back pain    • Obesity    • Peptic ulceration    • Peripheral neuropathy    • Tremor    • Urinary tract infection    • Visual impairment     reports that she quit smoking about a year ago. Her smoking use included Cigarettes. She has a 21.00 pack-year smoking history. She has never used smokeless tobacco. She reports that she drinks alcohol. She reports that she does not use illicit drugs.  Family History   Problem Relation Age of Onset   • Lymphoma Mother    • Depression Mother    • Cancer Father    • Heart attack Father    • Psoriasis Father    • Alcohol abuse Father    • Aneurysm Brother    • COPD Brother    • Heart disease Brother    • Heart attack Brother    • Cancer Maternal Aunt    • Cancer Maternal Uncle    • Cancer Paternal Aunt    • Heart disease Paternal Aunt    • Cancer Paternal Uncle    • Heart disease Paternal Uncle    • Aneurysm Brother    • Lung disease Brother     • Alcohol abuse Brother    • ADD / ADHD Son        Review of Systems  Constitutional: No wt loss, fever, fatigue  Gastrointestinal: No nausea, abdominal pain  Behavioral/Psych: No insomnia or anxiety   Cardiovascular Syncope with no chest pains or tightness in chest  Objective:       Physical Exam             Physical Exam  BP (!) 87/64  Pulse 75  Wt 218 lb (98.9 kg)  BMI 38.62 kg/m2    General appearance: NAD, conversant   Eyes: anicteric sclerae, moist conjunctivae; no lid-lag; PERRLA   HENT: Atraumatic; oropharynx clear with moist mucous membranes and no mucosal ulcerations;  normal hard and soft palate   Neck: Trachea midline; FROM, supple, no thyromegaly or lymphadenopathy   Lungs: CTA, with normal respiratory effort and no intercostal retractions   CV: S1-S2 regular, no murmurs, no rub, no gallop   Abdomen: Soft, non-tender; no masses or HSM   Extremities: No peripheral edema or extremity lymphadenopathy  Skin: Normal temperature, turgor and texture; no rash, ulcers or subcutaneous nodules   Psych: Appropriate affect, alert and oriented to person, place and time           Cardiographics  @  ECG 12 Lead  Date/Time: 9/14/2017 10:30 AM  Performed by: CARITO MICHAEL  Authorized by: CARITO MICHAEL   Comparison: compared with previous ECG   Similar to previous ECG  Rhythm: sinus rhythm  ST Flattening: all  Clinical impression: non-specific ECG            Echocardiogram:        Current Outpatient Prescriptions:   •  azelastine (ASTELIN) 0.1 % nasal spray, 2 sprays into each nostril 2 (Two) Times a Day. Use in each nostril as directed, Disp: 30 mL, Rfl: 12  •  DULoxetine (CYMBALTA) 60 MG capsule, Take 1 capsule by mouth 2 (Two) Times a Day., Disp: 180 capsule, Rfl: 1  •  fexofenadine (ALLEGRA) 180 MG tablet, Take 1 tablet by mouth Daily., Disp: 30 tablet, Rfl: 12  •  gabapentin (NEURONTIN) 600 MG tablet, 2 tid, Disp: 180 tablet, Rfl: 1  •  HYDROcodone-acetaminophen (NORCO) 5-325 MG per tablet, Take  1 tablet by mouth Every 6 (Six) Hours As Needed., Disp: , Rfl:   •  levothyroxine (SYNTHROID) 25 MCG tablet, Take 25 mcg by mouth., Disp: , Rfl:   •  omeprazole (priLOSEC) 40 MG capsule, Take 40 mg by mouth., Disp: , Rfl:   •  pilocarpine (SALAGEN) 5 MG tablet, Take 5 mg by mouth., Disp: , Rfl:   •  SEA SOFT NASAL MIST 0.65 % nasal spray, SPRAY INTO NOSE TID, Disp: , Rfl: 12  •  solifenacin (VESICARE) 5 MG tablet, Take 5 mg by mouth., Disp: , Rfl:   •  vitamin D (ERGOCALCIFEROL) 59777 UNITS capsule capsule, TAKE 1 CAPSULE BY MOUTH EVERY 7 DAYS, Disp: 12 capsule, Rfl: 0   Assessment:        Patient Active Problem List   Diagnosis   • Fibromyalgia   • Depression   • Acquired hypothyroidism   • Hyperlipidemia   • Vitamin D deficiency   • Antinuclear factor positive   • Gait instability   • Disorder of lipid metabolism   • Pain   • Autonomic neuropathy   • Intractable migraine with aura without status migrainosus   • Small fiber neuropathy   • Chronic bilateral low back pain without sciatica   • Chronic right shoulder pain   • Chest pain   • Lumbar spondylolysis               Plan:            ICD-10-CM ICD-9-CM   1. Near syncope R55 780.2   2. Precordial pain R07.2 786.51   3. Palpitations R00.2 785.1     1. Near syncope  Patient has been referred to the [facility] because of autonomic syncope  - ECG 12 Lead    2. Precordial pain  Workup in the past has been negative    3. Palpitations  Under control    Pt referred [facility] for autonomic syncope    6 month      COUNSELING:    Gabi Harris was given to patient for the following topics: diagnostic results, risk factor reductions, impressions, risks and benefits of treatment options and importance of treatment compliance .       SMOKING COUNSELING:    Counseling given: Not Answered      EMR Dragon/Transcription disclaimer:   Much of this encounter note is an electronic transcription/translation of spoken language to printed text. The electronic  translation of spoken language may permit erroneous, or at times, nonsensical words or phrases to be inadvertently transcribed; Although I have reviewed the note for such errors, some may still exist.

## 2017-09-19 ENCOUNTER — TELEPHONE (OUTPATIENT)
Dept: CARDIOLOGY | Facility: CLINIC | Age: 57
End: 2017-09-19

## 2017-09-19 NOTE — TELEPHONE ENCOUNTER
----- Message from Syeda Cardenas sent at 9/14/2017  4:45 PM EDT -----  Regarding: appt/rx ??  Contact: [phone]  Pt was in here today and dr sotelo told her she could do the monitor at [facility] since she had an appt for tomorrow.  They have since called her and cx her appt for tomorrow.  So she wants to have the monitor done here now  AND    She was suppose to get a rx from the dr there to bring her BP up

## 2017-10-06 ENCOUNTER — OFFICE VISIT (OUTPATIENT)
Dept: PAIN MEDICINE | Facility: CLINIC | Age: 57
End: 2017-10-06

## 2017-10-06 VITALS
HEART RATE: 73 BPM | TEMPERATURE: 97.7 F | OXYGEN SATURATION: 95 % | SYSTOLIC BLOOD PRESSURE: 122 MMHG | BODY MASS INDEX: 38.52 KG/M2 | WEIGHT: 217.4 LBS | DIASTOLIC BLOOD PRESSURE: 75 MMHG | RESPIRATION RATE: 18 BRPM | HEIGHT: 63 IN

## 2017-10-06 DIAGNOSIS — F32.89 OTHER DEPRESSION: ICD-10-CM

## 2017-10-06 DIAGNOSIS — G90.9 AUTONOMIC NEUROPATHY: ICD-10-CM

## 2017-10-06 DIAGNOSIS — M43.06 LUMBAR SPONDYLOLYSIS: Primary | ICD-10-CM

## 2017-10-06 DIAGNOSIS — M79.642 BILATERAL HAND PAIN: ICD-10-CM

## 2017-10-06 DIAGNOSIS — M79.641 BILATERAL HAND PAIN: ICD-10-CM

## 2017-10-06 PROCEDURE — 99214 OFFICE O/P EST MOD 30 MIN: CPT | Performed by: PAIN MEDICINE

## 2017-10-06 RX ORDER — HYDROCODONE BITARTRATE AND ACETAMINOPHEN 7.5; 325 MG/1; MG/1
TABLET ORAL
Refills: 0 | COMMUNITY
Start: 2017-09-21 | End: 2019-05-02

## 2017-10-06 RX ORDER — IBUPROFEN 800 MG/1
800 TABLET ORAL EVERY 8 HOURS PRN
COMMUNITY
End: 2021-07-12

## 2017-10-06 NOTE — PROGRESS NOTES
CHIEF COMPLAINT: Back Pain    HPI  Gabi Tomas is a 57 y.o. female.  She is here to follow up for Back Pain  .  Gabi Tomas is a 57 y.o. female  who presents to the office for follow-up. Since last visit their pain has worsened. Her bilateral hand pain has worsened along with her low back pain.     For her hand pain: Diagnosed with autonomic dysfunction. Has been seeing [facility] but is unhappy with recent visit and is switching care to [place]. Awaiting approval into program. Has continued severe constant pain in bilateral hands. Decreased ROM and very limited use due to pain. Worse with any use or touching hands. Has never tried sympathetic blocks.     For her low back pain, she underwent LMBB on 1/2017 with 100% pain relief for over 1 week. Diagnostically positive for the source of her pain. Tried to repeat injection but insurance denied repeat saying first way diagnostic for source and did not need to be repeated. Pain is a constant dull, ache. Located over bilateral low back and does not radiate into BLE. Worse with standing, leaning backwards and improved with injection, rest, heat.     Has had a lot of pyschosoical issues over last year. Admits to worsening depression with worsening pain. Unable to perform very many physical activities as she wishes. A lot of deaths in her family over last year.     Past pain medications:  Topamax - stopped due to not having migraines any more.   lyrica- makes heart flutter  Savella - rectal bleeding  Mobic 15 mg daily  Lidocaine patches - no help  Excedrin migraines - some help  Compound cream - didn't help     Current pain medications:   Gabapentin 600 mg qid - helps  Cymbalta 60 mg bid  zipsor 25 mg prn - very rarely   Lidocaine patches - helping     Past therapies:  Physical Therapy: yes  Chiropractor: no  Massage Therapy: yes  TENS: no  Neck or back surgery: yes     Previous Injection: Bilateral LMBB L3-Sa - 1/11/2017  Effect of Injection (%):  100%  Length of Relief: over 1 week      PEG Assessment   What number best describes your pain on average in the past week? 3  What number best describes how, during the past week, pain has interfered with your enjoyment of life? 2  What number best describes how, during the past week, pain has interfered with your general activity? 2      Current Outpatient Prescriptions:   •  azelastine (ASTELIN) 0.1 % nasal spray, 2 sprays into each nostril 2 (Two) Times a Day. Use in each nostril as directed, Disp: 30 mL, Rfl: 12  •  DULoxetine (CYMBALTA) 60 MG capsule, Take 1 capsule by mouth 2 (Two) Times a Day., Disp: 180 capsule, Rfl: 1  •  fexofenadine (ALLEGRA) 180 MG tablet, Take 1 tablet by mouth Daily., Disp: 30 tablet, Rfl: 12  •  gabapentin (NEURONTIN) 600 MG tablet, 2 tid, Disp: 180 tablet, Rfl: 1  •  HYDROcodone-acetaminophen (NORCO) 7.5-325 MG per tablet, TK 1 T PO  Q 6-8 H PRN P, Disp: , Rfl: 0  •  ibuprofen (ADVIL,MOTRIN) 800 MG tablet, Take 800 mg by mouth Every 8 (Eight) Hours As Needed for Mild Pain ., Disp: , Rfl:   •  levothyroxine (SYNTHROID) 25 MCG tablet, Take 25 mcg by mouth., Disp: , Rfl:   •  omeprazole (priLOSEC) 40 MG capsule, Take 40 mg by mouth., Disp: , Rfl:   •  pilocarpine (SALAGEN) 5 MG tablet, Take 5 mg by mouth., Disp: , Rfl:   •  SEA SOFT NASAL MIST 0.65 % nasal spray, SPRAY INTO NOSE TID, Disp: , Rfl: 12  •  solifenacin (VESICARE) 5 MG tablet, Take 5 mg by mouth., Disp: , Rfl:   •  vitamin D (ERGOCALCIFEROL) 22084 UNITS capsule capsule, TAKE 1 CAPSULE BY MOUTH EVERY 7 DAYS, Disp: 12 capsule, Rfl: 0    IMAGING  Neuro qsart 10/2014:  QSART responses at the proximal leg and distal leg are reduced  but the responses elsewhere are normal.  These findings are  nonspecific for etiology but are consistent with a postganglionic  sympathetic sudomotor abnormality like that seen in  autonomic/small fiber neuropathy. However, sparing of the foot is  not typical of length-dependent  axonopathy.      Neuro cardio autonomic reflex w/wo tilt 10/2014:  Heart rate response to deep breathing is borderline to mildly  reduced via the mean heart rate range and the E:I ratio.  Heart  rate response to the Valsalva maneuver, as assessed by the  Valsalva ratio, is mildly reduced but the blood pressure  responses to phase II and phase IV of the maneuver are normal.   During 10 minutes of 60 degrees head-up tilt, heart rate and  blood pressure responses were normal.      This cardiovascular autonomic test panel is notable for a  reduction of the heart rate response to deep breathing and the  Valsalva ratio. These findings are non-specific for etiology but  suggest cardiovagal impairment.     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    No imaging of low back    PFSH:  The following portions of the patient's history were reviewed and updated as appropriate: problem list, past medical history, past surgery history, social history, family history, medications, and allergies    Review of Systems   Constitutional: Positive for diaphoresis and fatigue. Negative for fever.   HENT: Negative for congestion.    Eyes: Negative for visual disturbance.   Respiratory: Positive for shortness of breath.    Cardiovascular: Negative for chest pain and palpitations.   Gastrointestinal: Positive for nausea. Negative for abdominal pain and vomiting.   Genitourinary: Positive for difficulty urinating.   Musculoskeletal: Positive for back pain and neck pain.   Skin: Negative for rash and wound.   Allergic/Immunologic: Negative for immunocompromised state.   Neurological: Positive for dizziness, weakness, light-headedness and headaches. Negative for syncope.   Hematological: Does not bruise/bleed easily.   Psychiatric/Behavioral: Positive for  "confusion and sleep disturbance. Negative for suicidal ideas. The patient is nervous/anxious.        Vitals:    10/06/17 1020   BP: 122/75   Pulse: 73   Resp: 18   Temp: 97.7 °F (36.5 °C)   SpO2: 95%   Weight: 217 lb 6.4 oz (98.6 kg)   Height: 63\" (160 cm)   PainSc:   3   PainLoc: Back       Physical Exam   Constitutional: She is oriented to person, place, and time. She appears well-developed and well-nourished. No distress.   HENT:   Head: Normocephalic and atraumatic.   Nose: Nose normal.   Mouth/Throat: Oropharynx is clear and moist.   Eyes: Conjunctivae and EOM are normal.   Neck: Normal range of motion. Neck supple.   Pulmonary/Chest: Effort normal. No stridor.   Musculoskeletal:        Right shoulder: She exhibits decreased range of motion, tenderness and pain.        Lumbar back: She exhibits decreased range of motion, tenderness and pain.        Right hand: She exhibits decreased range of motion, tenderness and deformity. She exhibits no swelling. Decreased sensation noted. Decreased strength noted.        Left hand: She exhibits decreased range of motion, tenderness and deformity. She exhibits no swelling. Decreased sensation noted. Decreased strength noted.   +bilateral lumbar facet tenderness  +bilateral lumbar facet loading   +worse pain in low back with lumbar flexion and twisting   Neurological: She is alert and oriented to person, place, and time. She has normal strength. No cranial nerve deficit or sensory deficit.   Skin: Skin is warm and dry. No laceration noted. She is not diaphoretic.   Psychiatric: Her speech is normal and behavior is normal. Her mood appears anxious. She exhibits a depressed mood.   Tearful   Nursing note and vitals reviewed.    Ortho Exam  Neurologic Exam     Mental Status   Oriented to person, place, and time.   Speech: speech is normal     Cranial Nerves     CN III, IV, VI   Extraocular motions are normal.     Motor Exam     Strength   Strength 5/5 throughout.       Lab " Results   Component Value Date    POCMETH Negative 12/08/2016    POCAMPHET Negative 12/08/2016    POCBARBITUR Negative 12/08/2016    POCBENZO Negative 12/08/2016    POCCOCAINE Negative 12/08/2016    POCMETHADO Negative 12/08/2016    POCOPIATES Negative 12/08/2016    POCOXYCODO Negative 12/08/2016    POCPHENCYC Negative 12/08/2016    POCPROPOXY Negative 12/08/2016    POCTHC Negative 12/08/2016    POCTRICYC Negative 12/08/2016     Last UDS results reviewed: 10/12/17   Last UDS: 12/8/2016  Comments: Consistent       Date of last JAVI reviewed : 10/12/17   Comments: Consistent     Assessment/Plan   Gabi was seen today for back pain.    Diagnoses and all orders for this visit:    Lumbar spondylolysis  -     Case Request  -     Ambulatory Referral to Psychology    Autonomic neuropathy  -     Case Request  -     Ambulatory Referral to Psychology    Other depression  -     Ambulatory Referral to Psychology      Requested Prescriptions      No prescriptions requested or ordered in this encounter     For her low back pain: Getting complete resolution of her low back pain with previous lumbar medial branch block it is diagnostically positive for the source of her pain.  Sent off for repeat injection to confirm that it would be diagnostically positive before proceeding for the radiofrequency ablation that it was denied by insurance stating that the first one was diagnostic for the source of her pain and did not need to be repeated.  - reviewed denial. Stated it was not approved due ot not aimed toward an ablation.     - Since the denial states there is no reason to repeat these diagnosti injections, will ahead and precede with the radiio fequency ablation.   - Given good benefit from one previous diagnosistic lumbar medial branch block, would likely receive longer pain relief with radiofrequency ablation of the lumbar medial branch nerves. Discussed with the patient regarding the etiology of their pain. Informed them that  they would likely benefit from a bilateral radiofrequency ablation of the lumbar medial branch nerves from L3 to SA.  The procedure was described in detail and the risks, benefits and alternatives were discussed with the patient (including but not limited to: bleeding, infection, nerve damage, worsening of pain, inability to perform injection, paralysis, seizures, and death) who agreed to proceed.      For her bilateral hand pain:  - She would likely benefit from a cervical sympathetic nerve block.  Given she has bilateral hand pain she would likely need bilateral stellate ganglion blocks.  Given her last hand pain is the worst pain will start with a left stellate ganglion sympathetic nerve block to see if she can't receive any pain relief.  - Discussed with the patient regarding the etiology of their pain. Informed them that they would likely benefit from a Left stellate ganglion/sympathetic block.  The procedure was described in detail and the risks, benefits and alternatives were discussed with the patient (including but not limited to: PTX, facial flushing, droopy eye, bleeding, infection, nerve damage, worsening of pain, inability to perform injection, paralysis, seizures, and death) who agreed to proceed. We'll perform under sedation.    - Wishes to precede with the stellate ganglion block first as hand pain is her worse pain.   - Can consider spinal cord stimulator in future.    - Given her worsening anxiety and depression with worsening chronic pain will refer patient to psychologist to help manage these issues.  Referral placed for Dr. Estrella today.  - New patient UDS was consistent.    Wt Readings from Last 3 Encounters:   10/06/17 217 lb 6.4 oz (98.6 kg)   09/14/17 218 lb (98.9 kg)   07/14/17 215 lb (97.5 kg)     Body mass index is 38.51 kg/(m^2). By CDC definitions, this patient is obese (BMI >= 30). Patient counseled on the importance of weight loss to help with overall health and pain control.  Patient instructed to attempt weight loss.   Plan: Calorie counting  reduce portion size, cut out extra servings and reduce fast food intake lose 5 pounds over the next 2 months.    Follow-up in 2 months. Or after injections.     Emilee Hickman MD  Pain Management

## 2017-10-06 NOTE — PATIENT INSTRUCTIONS
Sympathetic Nerve Block  A sympathetic nerve block is a procedure done to find out if your sympathetic nerves are damaged. You may also have this procedure to relieve pain from damaged sympathetic nerves. Sympathetic nerves leave your spinal cord and come together in a clump of nerves (ganglion). Your sympathetic nerves control some involuntary functions of your body. These include sweating, blood flow, and the widening and narrowing of blood vessels. When these nerves are damaged, they may send impulses to the muscles and cause them to contract. Eventually this can lead to long-standing (chronic) pain.   During a sympathetic nerve block, medicine is used to numb your sympathetic nerves. If your pain is caused by damaged sympathetic nerves, the pain will go away. The pain usually returns when the medicine wears off, but sometimes pain relief continues even after the medicine has worn off. You may have this procedure regularly to give you prolonged periods of pain relief. The injection site of the medicine depends on where your pain is. If you have:  · Upper body pain, you may have an injection in your neck to numb the ganglion in that area (stellate ganglion block).  · Abdominal pain, you may have an injection in the middle of your back to numb the ganglion in that area (celiac plexus block).  · Lower body pain, you may have an injection in your lower back to numb the ganglion in that area (lumbar sympathetic block).  LET YOUR HEALTH CARE PROVIDER KNOW ABOUT:  · Any allergies you have.  · All medicines you are taking, including vitamins, herbs, eye drops, creams, and over-the-counter medicines.  · Previous problems you or members of your family have had with the use of anesthetics.  · Any blood disorders you have.  · Previous surgeries you have had.  · Medical conditions you have.  RISKS AND COMPLICATIONS  Generally, this is a safe procedure. However, as with any procedure, problems can occur. Possible problems  include:  · Failure of the procedure to relieve your pain.  · Worse pain (usually temporary).  · Bleeding.  · Infection.  · Blood vessel damage.  · Nerve damage.  BEFORE THE PROCEDURE  · You may meet with your health care provider or a pain management specialist.  · Do not eat or drink anything after midnight on the night before the procedure or as directed by your health care provider.  · Plan to have someone take you home after the procedure.  PROCEDURE   · Your health care provider will insert an IV tube in a vein.  · You may be given a medicine that makes you relax and go to sleep (general anesthetic).  · Numbing medicine (local anesthetic) will be injected into the skin over the affected area.  · The health care provider then inserts a needle into the numbed area. Imaging studies help your provider position the needle.    Usually X-ray guidance (fluoroscopy) is used.    Sometimes a CT scan is done instead.  · Once the needle is in the proper position, numbing medicine is injected into the nerves.  · The needle and IV tube are removed. Small bandages may be placed over the insertion sites.  AFTER THE PROCEDURE  · You will stay in a recovery area until the sedation wears off and your health care provider says you can go home.  · You may notice redness in the areas of the body where medicine was injected.     This information is not intended to replace advice given to you by your health care provider. Make sure you discuss any questions you have with your health care provider.     Document Released: 06/23/2004 Document Revised: 12/23/2014 Document Reviewed: 11/25/2014  [employer] Interactive Patient Education ©2017 [employer]

## 2017-10-12 PROBLEM — M79.641 BILATERAL HAND PAIN: Status: ACTIVE | Noted: 2017-10-12

## 2017-10-12 PROBLEM — M79.642 BILATERAL HAND PAIN: Status: ACTIVE | Noted: 2017-10-12

## 2017-11-29 ENCOUNTER — OUTSIDE FACILITY SERVICE (OUTPATIENT)
Dept: PAIN MEDICINE | Facility: CLINIC | Age: 57
End: 2017-11-29

## 2017-11-29 ENCOUNTER — DOCUMENTATION (OUTPATIENT)
Dept: PAIN MEDICINE | Facility: CLINIC | Age: 57
End: 2017-11-29

## 2017-11-29 PROCEDURE — 64635 DESTROY LUMB/SAC FACET JNT: CPT | Performed by: PAIN MEDICINE

## 2017-11-29 PROCEDURE — 64636 DESTROY L/S FACET JNT ADDL: CPT | Performed by: PAIN MEDICINE

## 2017-11-29 NOTE — PROGRESS NOTES
Bilateral L3-5 Lumbar Medial Branch RADIOFREQUENCY  [facility]    PREOPERATIVE DIAGNOSIS:  Lumbar spondylosis without myelopathy    POSTOPERATIVE DIAGNOSIS:  Lumbar spondylosis without myelopathy    PROCEDURE:   Diagnostic Bilateral Lumbar Medial Branch Nerve thermal radiofrequency lesioning, with fluoroscopy:  L3, L4, and L5 nerves (at the L3, L4 and L5 transverse processes and the sacral alar groove) to thermally treat the innervation to facet joints L4-5 and L5-S1  1. 47042-17 -- Bilateral L/S facet neuro destr., 1st Level  2. 73532-10 -- Bilateral L/S facet neuro destr., 2nd  Level    PRE-PROCEDURE DISCUSSION WITH PATIENT:    Risks and complications were discussed with the patient prior to starting the procedure and informed consent was obtained.      SURGEON:  Emilee Hickman MD    REASON FOR PROCEDURE:    The patient complains of pain that seems to have a significant axial component Previous diagnostic positivity of a Lumbar Medial Branch Blockade at the same levels The patient admits to 80% or more pain relief diagnostically from medial branch blockades. Tenderness of the affected facet joints on palpation Increased back pain on range of motion exams Pain on extension of the lumbar spine Positive lumbar facet loading maneuver    SEDATION:  Versed 4mg & Fentanyl 100 mcg IV  TIME OF PROCEDURE:   The intraoperative procedure time after administration of the sedative was 24 minutes.     ANESTHETIC:  Lidocaine 2%  STEROID:  NONE      DESCRIPTON OF PROCEDURE:  After obtaining informed consent, IV access  was obtained in the preoperative area.   The patient was taken to the operating room.  The patient was placed in the prone position with a pillow under the abdomen. All pressure points were well padded.  EKG, blood pressure, and pulse oximeter were monitored.  The patient was monitored and sedated by the RN under my direction. The lumbosacral area was prepped with Chloraprep and draped in a  sterile fashion.     Under fluoroscopic guidance the transverse processes of the L4 and L5 vertebrae at the junctions of the superior articular processes were identified on the right.  Also identified was the groove between the ala and the superior articular process of the sacrum on the ipsilateral side.  Skin and subcutaneous tissue were anesthetized with 1ml of 1% lidocaine above each of these points. Then, radiofrequency probe needles were advanced in this fluoro view to the above junctions.  Aspiration was negative for blood and CSF.  After confirming the position of the needle with fluoroscope in all views, testing was initiated.  First, sensory testing was started on each needle a 1V and 50Hz and slowly decreased until painful pressure stimulation diminished at 0.5V.  Next, motor testing was confirmed to be negative at 3V and 2Hz for any radicular stimulation.  Then 1mL of the local anesthetic was instilled in each needle.  Two minutes elapsed, and during this time a lateral fluoroscopic view was confirmed again to ensure the needles had not advanced nor retracted.  Then, Radiofrequency Lesioning was initiated for 3 minutes at 80 degrees Celsius.  Needles were removed intact from each of the areas.     A similar procedure was repeated to address the L3, L4, and L5 nerves on the contralateral side.   Onset of analgesia was noted.  Vital signs remained stable throughout.      ESTIMATED BLOOD LOSS:  <5 mL  SPECIMENS:  none    COMPLICATIONS:   No complications were noted.    TOLERANCE & DISCHARGE CONDITION:    The patient tolerated the procedure well.  The patient was transported to the recovery area without difficulties.  The patient was discharged to home under the care of family in stable and satisfactory condition.    PLAN OF CARE:  1. The patient was given our standard instruction sheet.  2. The patient will  Return to clinic PRN.  3. The patient will resume all medications as per the medication  reconciliation sheet.

## 2017-12-06 ENCOUNTER — TELEPHONE (OUTPATIENT)
Dept: PAIN MEDICINE | Facility: CLINIC | Age: 57
End: 2017-12-06

## 2017-12-06 NOTE — TELEPHONE ENCOUNTER
Ms. Tomas called today and states that she is still having a lot of back pain after having a lumbar RF on 11/29/17. I told her that it's normal to have post procedural pain for a few week after the RF. I also told her she can try putting ice over the area to help with the pain has well as alternating ibuprofen with her pain medication. I asked her if she had any redness or swelling at the injections sites and she stated they looked good. I told her she could call the office again if she has any other questions.

## 2018-01-11 ENCOUNTER — TELEPHONE (OUTPATIENT)
Dept: FAMILY MEDICINE CLINIC | Facility: CLINIC | Age: 58
End: 2018-01-11

## 2018-02-01 RX ORDER — DULOXETIN HYDROCHLORIDE 60 MG/1
CAPSULE, DELAYED RELEASE ORAL
Qty: 180 CAPSULE | Refills: 0 | Status: ON HOLD | OUTPATIENT
Start: 2018-02-01 | End: 2023-02-01

## 2018-02-16 RX ORDER — GABAPENTIN 600 MG/1
TABLET ORAL
Qty: 180 TABLET | Refills: 0 | OUTPATIENT
Start: 2018-02-16

## 2018-03-15 ENCOUNTER — OFFICE VISIT (OUTPATIENT)
Dept: CARDIOLOGY | Facility: CLINIC | Age: 58
End: 2018-03-15

## 2018-03-15 VITALS
SYSTOLIC BLOOD PRESSURE: 139 MMHG | HEART RATE: 72 BPM | WEIGHT: 221 LBS | DIASTOLIC BLOOD PRESSURE: 89 MMHG | BODY MASS INDEX: 39.15 KG/M2

## 2018-03-15 DIAGNOSIS — R42 DIZZINESS: ICD-10-CM

## 2018-03-15 DIAGNOSIS — E78.49 OTHER HYPERLIPIDEMIA: Primary | ICD-10-CM

## 2018-03-15 DIAGNOSIS — G90.9 AUTONOMIC NEUROPATHY: ICD-10-CM

## 2018-03-15 PROCEDURE — 99213 OFFICE O/P EST LOW 20 MIN: CPT | Performed by: INTERNAL MEDICINE

## 2018-03-15 RX ORDER — ALPRAZOLAM 0.5 MG/1
TABLET ORAL
Refills: 5 | COMMUNITY
Start: 2018-01-26

## 2018-03-15 RX ORDER — MONTELUKAST SODIUM 10 MG/1
10 TABLET ORAL
COMMUNITY
Start: 2017-10-26

## 2018-03-15 NOTE — PROGRESS NOTES
Subjective:       Gabi Tomas is a 58 y.o. female who here for follow up    CC  Dizziness with turn face from side to side  HPI  58 years old white female with known history of autonomic neuropathy dizziness secondary to that hyperlipidemia normally been followed up at the [facility] here for the follow-up with no complaints of chest pains or tightness in chest no heaviness or the pressure sensation     Problem List Items Addressed This Visit        Cardiovascular and Mediastinum    Hyperlipidemia - Primary       Nervous and Auditory    Autonomic neuropathy       Other    Dizziness      Other Visit Diagnoses    None.       .    The following portions of the patient's history were reviewed and updated as appropriate: allergies, current medications, past family history, past medical history, past social history, past surgical history and problem list.    Past Medical History:   Diagnosis Date   • Allergic    • Anemia    • Anxiety    • Arthritis    • Cancer    • Depression    • Eating disorder    • Fibromyalgia, primary    • GERD (gastroesophageal reflux disease)    • Headache    • Hyperlipidemia    • Hypothyroidism    • Low back pain    • Obesity    • Orthostatic hypertension    • Peptic ulceration    • Peripheral neuropathy    • Tremor    • Urinary tract infection    • Visual impairment     reports that she quit smoking about 17 months ago. Her smoking use included Cigarettes. She has a 21.00 pack-year smoking history. She has never used smokeless tobacco. She reports that she drinks alcohol. She reports that she does not use drugs.  Family History   Problem Relation Age of Onset   • Lymphoma Mother    • Depression Mother    • Cancer Father    • Heart attack Father    • Psoriasis Father    • Alcohol abuse Father    • Aneurysm Brother    • COPD Brother    • Heart disease Brother    • Heart attack Brother    • Cancer Maternal Aunt    • Cancer Maternal Uncle    • Cancer Paternal Aunt    • Heart disease  Paternal Aunt    • Cancer Paternal Uncle    • Heart disease Paternal Uncle    • Aneurysm Brother    • Lung disease Brother    • Alcohol abuse Brother    • ADD / ADHD Son        Review of Systems  Constitutional: No wt loss, fever, fatigue  Gastrointestinal: No nausea, abdominal pain  Behavioral/Psych: No insomnia or anxiety   Cardiovascular No chest pains or tightness in chest  Objective:       Physical Exam           Physical Exam  /89   Pulse 72   Wt 100 kg (221 lb)   BMI 39.15 kg/m²     General appearance: NAD, conversant   Eyes: anicteric sclerae, moist conjunctivae; no lid-lag; PERRLA   HENT: Atraumatic; oropharynx clear with moist mucous membranes and no mucosal ulcerations;  normal hard and soft palate   Neck: Trachea midline; FROM, supple, no thyromegaly or lymphadenopathy   Lungs: CTA, with normal respiratory effort and no intercostal retractions   CV: S1-S2 regular, no murmurs, no rub, no gallop   Abdomen: Soft, non-tender; no masses or HSM   Extremities: No peripheral edema or extremity lymphadenopathy  Skin: Normal temperature, turgor and texture; no rash, ulcers or subcutaneous nodules   Psych: Appropriate affect, alert and oriented to person, place and time           Cardiographics  @Procedures    Echocardiogram:        Current Outpatient Prescriptions:   •  ALPRAZolam (XANAX) 0.5 MG tablet, TK 1 T PO  BID PRN, Disp: , Rfl: 5  •  DULoxetine (CYMBALTA) 60 MG capsule, TAKE ONE CAPSULE BY MOUTH TWICE DAILY, Disp: 180 capsule, Rfl: 0  •  fexofenadine (ALLEGRA) 180 MG tablet, Take 1 tablet by mouth Daily., Disp: 30 tablet, Rfl: 12  •  gabapentin (NEURONTIN) 600 MG tablet, 2 tid, Disp: 180 tablet, Rfl: 1  •  HYDROcodone-acetaminophen (NORCO) 7.5-325 MG per tablet, TK 1 T PO  Q 6-8 H PRN P, Disp: , Rfl: 0  •  ibuprofen (ADVIL,MOTRIN) 800 MG tablet, Take 800 mg by mouth Every 8 (Eight) Hours As Needed for Mild Pain ., Disp: , Rfl:   •  levothyroxine (SYNTHROID) 25 MCG tablet, Take 25 mcg by mouth.,  Disp: , Rfl:   •  montelukast (SINGULAIR) 10 MG tablet, Take 10 mg by mouth., Disp: , Rfl:   •  omeprazole (priLOSEC) 40 MG capsule, Take 40 mg by mouth., Disp: , Rfl:   •  pilocarpine (SALAGEN) 5 MG tablet, Take 5 mg by mouth., Disp: , Rfl:   •  SEA SOFT NASAL MIST 0.65 % nasal spray, SPRAY INTO NOSE TID, Disp: , Rfl: 12  •  solifenacin (VESICARE) 5 MG tablet, Take 5 mg by mouth., Disp: , Rfl:   •  vitamin D (ERGOCALCIFEROL) 52739 UNITS capsule capsule, TAKE 1 CAPSULE BY MOUTH EVERY 7 DAYS, Disp: 12 capsule, Rfl: 0   Assessment:        Patient Active Problem List   Diagnosis   • Fibromyalgia   • Depression   • Acquired hypothyroidism   • Hyperlipidemia   • Vitamin D deficiency   • Antinuclear factor positive   • Gait instability   • Disorder of lipid metabolism   • Pain   • Autonomic neuropathy   • Intractable migraine with aura without status migrainosus   • Small fiber neuropathy   • Chronic bilateral low back pain without sciatica   • Chronic right shoulder pain   • Chest pain   • Lumbar spondylolysis   • Bilateral hand pain               Plan:            ICD-10-CM ICD-9-CM   1. Other hyperlipidemia E78.4 272.4   2. Autonomic neuropathy G90.9 337.9   3. Dizziness R42 780.4     1. Other hyperlipidemia  Counseling has been done    2. Autonomic neuropathy  Being [facility] up at the [facility]    3. Dizziness  Due to autonomic neuropathy  contact [facility] , pt is scheduled    See in 1 yr  COUNSELING:    Gabi Harris was given to patient for the following topics: diagnostic results, risk factor reductions, impressions, risks and benefits of treatment options and importance of treatment compliance .       SMOKING COUNSELING:    Counseling given: Not Answered      EMR Dragon/Transcription disclaimer:   Much of this encounter note is an electronic transcription/translation of spoken language to printed text. The electronic translation of spoken language may permit erroneous, or at times,  nonsensical words or phrases to be inadvertently transcribed; Although I have reviewed the note for such errors, some may still exist.

## 2018-03-23 PROBLEM — R42 DIZZINESS: Status: ACTIVE | Noted: 2018-03-23

## 2018-04-13 ENCOUNTER — APPOINTMENT (OUTPATIENT)
Dept: CARDIOLOGY | Facility: HOSPITAL | Age: 58
End: 2018-04-13

## 2018-04-13 ENCOUNTER — HOSPITAL ENCOUNTER (EMERGENCY)
Facility: HOSPITAL | Age: 58
Discharge: HOME | End: 2018-04-14
Attending: EMERGENCY MEDICINE | Admitting: EMERGENCY MEDICINE

## 2018-04-13 DIAGNOSIS — I80.8 PHLEBITIS OF RIGHT ARM: Primary | ICD-10-CM

## 2018-04-13 LAB
ALBUMIN SERPL-MCNC: 4.1 G/DL (ref 3.5–5.2)
ALBUMIN/GLOB SERPL: 1.6 G/DL
ALP SERPL-CCNC: 72 U/L (ref 39–117)
ALT SERPL W P-5'-P-CCNC: 31 U/L (ref 1–33)
ANION GAP SERPL CALCULATED.3IONS-SCNC: 11.5 MMOL/L
AST SERPL-CCNC: 34 U/L (ref 1–32)
BASOPHILS # BLD AUTO: 0.04 10*3/MM3 (ref 0–0.2)
BASOPHILS NFR BLD AUTO: 0.5 % (ref 0–1.5)
BILIRUB SERPL-MCNC: 0.3 MG/DL (ref 0.1–1.2)
BUN BLD-MCNC: 16 MG/DL (ref 6–20)
BUN/CREAT SERPL: 20.8 (ref 7–25)
CALCIUM SPEC-SCNC: 9.3 MG/DL (ref 8.6–10.5)
CHLORIDE SERPL-SCNC: 105 MMOL/L (ref 98–107)
CO2 SERPL-SCNC: 24.5 MMOL/L (ref 22–29)
CREAT BLD-MCNC: 0.77 MG/DL (ref 0.57–1)
DEPRECATED RDW RBC AUTO: 47.5 FL (ref 37–54)
EOSINOPHIL # BLD AUTO: 0.32 10*3/MM3 (ref 0–0.7)
EOSINOPHIL NFR BLD AUTO: 4 % (ref 0.3–6.2)
ERYTHROCYTE [DISTWIDTH] IN BLOOD BY AUTOMATED COUNT: 14.3 % (ref 11.7–13)
GFR SERPL CREATININE-BSD FRML MDRD: 78 ML/MIN/1.73
GFR SERPL CREATININE-BSD FRML MDRD: 94 ML/MIN/1.73
GLOBULIN UR ELPH-MCNC: 2.5 GM/DL
GLUCOSE BLD-MCNC: 102 MG/DL (ref 65–99)
HCT VFR BLD AUTO: 44.4 % (ref 35.6–45.5)
HGB BLD-MCNC: 14.7 G/DL (ref 11.9–15.5)
IMM GRANULOCYTES # BLD: 0.02 10*3/MM3 (ref 0–0.03)
IMM GRANULOCYTES NFR BLD: 0.2 % (ref 0–0.5)
LYMPHOCYTES # BLD AUTO: 3.02 10*3/MM3 (ref 0.9–4.8)
LYMPHOCYTES NFR BLD AUTO: 37.7 % (ref 19.6–45.3)
MCH RBC QN AUTO: 30.1 PG (ref 26.9–32)
MCHC RBC AUTO-ENTMCNC: 33.1 G/DL (ref 32.4–36.3)
MCV RBC AUTO: 90.8 FL (ref 80.5–98.2)
MONOCYTES # BLD AUTO: 0.55 10*3/MM3 (ref 0.2–1.2)
MONOCYTES NFR BLD AUTO: 6.9 % (ref 5–12)
NEUTROPHILS # BLD AUTO: 4.07 10*3/MM3 (ref 1.9–8.1)
NEUTROPHILS NFR BLD AUTO: 50.7 % (ref 42.7–76)
PLATELET # BLD AUTO: 271 10*3/MM3 (ref 140–500)
PMV BLD AUTO: 11 FL (ref 6–12)
POTASSIUM BLD-SCNC: 4.6 MMOL/L (ref 3.5–5.2)
PROT SERPL-MCNC: 6.6 G/DL (ref 6–8.5)
RBC # BLD AUTO: 4.89 10*6/MM3 (ref 3.9–5.2)
SODIUM BLD-SCNC: 141 MMOL/L (ref 136–145)
WBC NRBC COR # BLD: 8.02 10*3/MM3 (ref 4.5–10.7)

## 2018-04-13 PROCEDURE — 93971 EXTREMITY STUDY: CPT

## 2018-04-13 PROCEDURE — 85025 COMPLETE CBC W/AUTO DIFF WBC: CPT | Performed by: PHYSICIAN ASSISTANT

## 2018-04-13 PROCEDURE — 99283 EMERGENCY DEPT VISIT LOW MDM: CPT

## 2018-04-13 PROCEDURE — 80053 COMPREHEN METABOLIC PANEL: CPT | Performed by: PHYSICIAN ASSISTANT

## 2018-04-13 RX ORDER — SODIUM CHLORIDE 0.9 % (FLUSH) 0.9 %
10 SYRINGE (ML) INJECTION AS NEEDED
Status: DISCONTINUED | OUTPATIENT
Start: 2018-04-13 | End: 2018-04-14 | Stop reason: HOSPADM

## 2018-04-13 RX ORDER — SULFAMETHOXAZOLE AND TRIMETHOPRIM 800; 160 MG/1; MG/1
2 TABLET ORAL 2 TIMES DAILY
Qty: 28 TABLET | Refills: 0 | Status: SHIPPED | OUTPATIENT
Start: 2018-04-13 | End: 2018-04-14

## 2018-04-13 RX ORDER — FLUTICASONE PROPIONATE 50 MCG
2 SPRAY, SUSPENSION (ML) NASAL DAILY
COMMUNITY

## 2018-04-13 NOTE — ED TRIAGE NOTES
"Pt presents to ED with c/o right arm pain centrally located in right AC.  She went to [facility] this past Monday for \"isotope testing\"  had increasing arm pain, [facility] come [facility] ED for further workup.  Pt has mod amt bruising in the AC and states \"my whole arm hurts\" and has had a \"general feeling of malaise\".  Pt also states she has noted \"pus oozing from the puncture site\"  No drainage noted at this time  "

## 2018-04-14 VITALS
TEMPERATURE: 97.8 F | HEIGHT: 63 IN | WEIGHT: 225 LBS | OXYGEN SATURATION: 98 % | SYSTOLIC BLOOD PRESSURE: 120 MMHG | BODY MASS INDEX: 39.87 KG/M2 | DIASTOLIC BLOOD PRESSURE: 82 MMHG | HEART RATE: 75 BPM | RESPIRATION RATE: 18 BRPM

## 2018-04-14 LAB
BH CV UPPER VENOUS LEFT INTERNAL JUGULAR AUGMENT: NORMAL
BH CV UPPER VENOUS LEFT INTERNAL JUGULAR COMPETENT: NORMAL
BH CV UPPER VENOUS LEFT INTERNAL JUGULAR COMPRESS: NORMAL
BH CV UPPER VENOUS LEFT INTERNAL JUGULAR PHASIC: NORMAL
BH CV UPPER VENOUS LEFT INTERNAL JUGULAR SPONT: NORMAL
BH CV UPPER VENOUS LEFT SUBCLAVIAN AUGMENT: NORMAL
BH CV UPPER VENOUS LEFT SUBCLAVIAN COMPETENT: NORMAL
BH CV UPPER VENOUS LEFT SUBCLAVIAN COMPRESS: NORMAL
BH CV UPPER VENOUS LEFT SUBCLAVIAN PHASIC: NORMAL
BH CV UPPER VENOUS LEFT SUBCLAVIAN SPONT: NORMAL
BH CV UPPER VENOUS RIGHT AXILLARY AUGMENT: NORMAL
BH CV UPPER VENOUS RIGHT AXILLARY COMPETENT: NORMAL
BH CV UPPER VENOUS RIGHT AXILLARY COMPRESS: NORMAL
BH CV UPPER VENOUS RIGHT AXILLARY PHASIC: NORMAL
BH CV UPPER VENOUS RIGHT AXILLARY SPONT: NORMAL
BH CV UPPER VENOUS RIGHT BASILIC FOREARM COMPRESS: NORMAL
BH CV UPPER VENOUS RIGHT BASILIC UPPER COMPRESS: NORMAL
BH CV UPPER VENOUS RIGHT BRACHIAL COMPRESS: NORMAL
BH CV UPPER VENOUS RIGHT CEPHALIC FOREARM COMPRESS: NORMAL
BH CV UPPER VENOUS RIGHT CEPHALIC UPPER COMPRESS: NORMAL
BH CV UPPER VENOUS RIGHT INTERNAL JUGULAR AUGMENT: NORMAL
BH CV UPPER VENOUS RIGHT INTERNAL JUGULAR COMPETENT: NORMAL
BH CV UPPER VENOUS RIGHT INTERNAL JUGULAR COMPRESS: NORMAL
BH CV UPPER VENOUS RIGHT INTERNAL JUGULAR PHASIC: NORMAL
BH CV UPPER VENOUS RIGHT INTERNAL JUGULAR SPONT: NORMAL
BH CV UPPER VENOUS RIGHT RADIAL COMPRESS: NORMAL
BH CV UPPER VENOUS RIGHT SUBCLAVIAN AUGMENT: NORMAL
BH CV UPPER VENOUS RIGHT SUBCLAVIAN COMPETENT: NORMAL
BH CV UPPER VENOUS RIGHT SUBCLAVIAN COMPRESS: NORMAL
BH CV UPPER VENOUS RIGHT SUBCLAVIAN PHASIC: NORMAL
BH CV UPPER VENOUS RIGHT SUBCLAVIAN SPONT: NORMAL
BH CV UPPER VENOUS RIGHT ULNAR COMPRESS: NORMAL

## 2018-04-14 RX ORDER — DOXYCYCLINE 100 MG/1
100 CAPSULE ORAL 2 TIMES DAILY
Qty: 14 CAPSULE | Refills: 0 | Status: SHIPPED | OUTPATIENT
Start: 2018-04-14 | End: 2018-08-27

## 2018-04-14 NOTE — ED NOTES
Doppler advised of patients orders at this time.  Presently here with another case.      Pat Howard RN  04/13/18 9919

## 2018-04-14 NOTE — ED PROVIDER NOTES
Pt presents to the ED c/o RUE pain and drainage s/p isotope testing requiring multiple IVs in the AC area. She has associated purulent discharge X 2 days. On exam, Pt is resting comfortably, in no distress, and without focal neurologic deficit. She has bruising w/ a small erythematous area w/o fluctuant or drainage to the right AC. Pulses palpable.     Attestation:    The ALDO and I have discussed this patient's history, physical exam, and treatment plan.  I have reviewed the documentation and personally had a face to face interaction with the patient. I affirm the documentation and agree with the treatment and plan.  The attached note describes my personal findings.    Documentation assistance provided by elizabeth Santos for Dr. Valles. Information recorded by the scribe was done at my direction and has been verified and validated by me.       Araceli Santos  04/14/18 0026       Alberto Valles MD  04/14/18 0251

## 2018-04-14 NOTE — ED PROVIDER NOTES
EMERGENCY DEPARTMENT ENCOUNTER    CHIEF COMPLAINT  Chief Complaint: arm pain  History given by: pt  History limited by: nothing  Room Number: 11/11  PMD: Carrillo Drake MD      HPI:  Pt is a 58 y.o. female who presents complaining of R arm pain. Her pain is located in the AC along with bruising. She recently had isotope testing at the [facility] and had several ivs placed. Pt has neuropathy and her entire arm aches. She denies CP or SOB, and she has never had a blood clot before. She endorses a pus-like discharge from her RUE for the past 2 days.    Duration:  A few days  Onset: gradual  Timing: constant  Location: R arm  Radiation: none  Quality: ache  Intensity/Severity: moderate  Progression: worsening  Associated Symptoms: none  Aggravating Factors: none  Alleviating Factors: none  Previous Episodes: No previous episodes noted.  Treatment before arrival: Pt was at [facility] and was sent here.    PAST MEDICAL HISTORY  Active Ambulatory Problems     Diagnosis Date Noted   • Fibromyalgia 04/22/2016   • Depression 04/22/2016   • Acquired hypothyroidism 04/22/2016   • Hyperlipidemia 04/22/2016   • Vitamin D deficiency 04/22/2016   • Antinuclear factor positive 08/28/2014   • Gait instability 08/28/2014   • Disorder of lipid metabolism 08/28/2014   • Pain 06/06/2014   • Autonomic neuropathy 05/16/2016   • Intractable migraine with aura without status migrainosus 05/16/2016   • Small fiber neuropathy 06/28/2016   • Chronic bilateral low back pain without sciatica 12/08/2016   • Chronic right shoulder pain 12/08/2016   • Chest pain 03/23/2017   • Lumbar spondylolysis 05/22/2017   • Bilateral hand pain 10/12/2017   • Dizziness 03/23/2018     Resolved Ambulatory Problems     Diagnosis Date Noted   • No Resolved Ambulatory Problems     Past Medical History:   Diagnosis Date   • Allergic    • Anemia    • Anxiety    • Arthritis    • Cancer    • Depression    • Eating disorder    • Fibromyalgia, primary     • GERD (gastroesophageal reflux disease)    • Headache    • Hyperlipidemia    • Hypothyroidism    • Low back pain    • Obesity    • Orthostatic hypertension    • Peptic ulceration    • Peripheral neuropathy    • Tremor    • Urinary tract infection    • Visual impairment        PAST SURGICAL HISTORY  Past Surgical History:   Procedure Laterality Date   • CERVICAL CONIZATION     • DILATATION AND CURETTAGE         FAMILY HISTORY  Family History   Problem Relation Age of Onset   • Lymphoma Mother    • Depression Mother    • Cancer Father    • Heart attack Father    • Psoriasis Father    • Alcohol abuse Father    • Aneurysm Brother    • COPD Brother    • Heart disease Brother    • Heart attack Brother    • Cancer Maternal Aunt    • Cancer Maternal Uncle    • Cancer Paternal Aunt    • Heart disease Paternal Aunt    • Cancer Paternal Uncle    • Heart disease Paternal Uncle    • Aneurysm Brother    • Lung disease Brother    • Alcohol abuse Brother    • ADD / ADHD Son        SOCIAL HISTORY  Social History     Social History   • Marital status:      Spouse name: N/A   • Number of children: N/A   • Years of education: N/A     Occupational History   • Not on file.     Social History Main Topics   • Smoking status: Former Smoker     Packs/day: 0.50     Years: 42.00     Types: Cigarettes     Quit date: 10/1/2016   • Smokeless tobacco: Never Used   • Alcohol use Yes      Comment: OCCASIONAL   • Drug use: No   • Sexual activity: Defer     Other Topics Concern   • Not on file     Social History Narrative   • No narrative on file       ALLERGIES  Ampicillin; Milnacipran; Quinolones; Cyclobenzaprine; Levofloxacin; Savella  [milnacipran hcl]; and Pregabalin    REVIEW OF SYSTEMS  Review of Systems   Constitutional: Negative for fever.   HENT: Negative for sore throat.    Respiratory: Negative for cough and shortness of breath.    Cardiovascular: Negative for chest pain.   Gastrointestinal: Negative for abdominal pain, diarrhea  and vomiting.   Genitourinary: Negative for dysuria.   Musculoskeletal: Positive for arthralgias (RUE). Negative for neck pain.   Skin: Positive for wound (RUE bruising). Negative for rash.   Neurological: Negative for weakness, numbness and headaches.   Psychiatric/Behavioral: Negative.    All other systems reviewed and are negative.      PHYSICAL EXAM  ED Triage Vitals   Temp Heart Rate Resp BP SpO2   04/13/18 1927 04/13/18 1927 04/13/18 1927 04/13/18 1952 04/13/18 1927   99.8 °F (37.7 °C) 108 16 128/95 98 %      Temp src Heart Rate Source Patient Position BP Location FiO2 (%)   04/13/18 1927 04/13/18 1927 04/13/18 1952 04/13/18 1952 --   Tympanic Monitor Sitting Right arm        Physical Exam   Constitutional: She is oriented to person, place, and time and well-developed, well-nourished, and in no distress. No distress.   Neck: Normal range of motion. Neck supple.   Cardiovascular: Normal rate, regular rhythm and normal heart sounds.    Pulmonary/Chest: Effort normal and breath sounds normal. No respiratory distress.   Abdominal: Soft. There is no tenderness. There is no rebound and no guarding.   Musculoskeletal: Normal range of motion. She exhibits tenderness (medial R upper arm). She exhibits no edema.   R intercubital area mulitple puncture sites.  No erythema.  No discharge.    Lymphadenopathy:   No lymphangitis   Neurological: She is alert and oriented to person, place, and time. She has normal sensation and normal strength.   Neurovascularly intact distally   Skin: Skin is warm and dry. Ecchymosis (R intercupital area) noted. No rash noted. No erythema.   Psychiatric: Mood and affect normal.   Nursing note and vitals reviewed.      LAB RESULTS  Lab Results (last 24 hours)     Procedure Component Value Units Date/Time    CBC & Differential [[id number]] Collected:  04/13/18 2258    Specimen:  Blood Updated:  04/13/18 2313    Narrative:       The following orders were created for panel order CBC &  Differential.  Procedure                               Abnormality         Status                     ---------                               -----------         ------                     CBC Auto Differential[[id number]]        Abnormal            Final result                 Please view results for these tests on the individual orders.    Comprehensive Metabolic Panel [[id number]]  (Abnormal) Collected:  04/13/18 2258    Specimen:  Blood Updated:  04/13/18 2341     Glucose 102 (H) mg/dL      BUN 16 mg/dL      Creatinine 0.77 mg/dL      Sodium 141 mmol/L      Potassium 4.6 mmol/L      Comment: Specimen hemolyzed.  Results may be affected. Released per INDER Juarez        Chloride 105 mmol/L      CO2 24.5 mmol/L      Calcium 9.3 mg/dL      Total Protein 6.6 g/dL      Albumin 4.10 g/dL      ALT (SGPT) 31 U/L      Comment: Specimen hemolyzed.  Results may be affected. Released per INDER Juarez        AST (SGOT) 34 (H) U/L      Comment: Specimen hemolyzed.  Results may be affected. Released per INDER Juarez        Alkaline Phosphatase 72 U/L      Total Bilirubin 0.3 mg/dL      eGFR Non African Amer 78 mL/min/1.73      eGFR  African Amer 94 mL/min/1.73      Globulin 2.5 gm/dL      A/G Ratio 1.6 g/dL      BUN/Creatinine Ratio 20.8     Anion Gap 11.5 mmol/L     CBC Auto Differential [[id number]]  (Abnormal) Collected:  04/13/18 2258    Specimen:  Blood Updated:  04/13/18 2313     WBC 8.02 10*3/mm3      RBC 4.89 10*6/mm3      Hemoglobin 14.7 g/dL      Hematocrit 44.4 %      MCV 90.8 fL      MCH 30.1 pg      MCHC 33.1 g/dL      RDW 14.3 (H) %      RDW-SD 47.5 fl      MPV 11.0 fL      Platelets 271 10*3/mm3      Neutrophil % 50.7 %      Lymphocyte % 37.7 %      Monocyte % 6.9 %      Eosinophil % 4.0 %      Basophil % 0.5 %      Immature Grans % 0.2 %      Neutrophils, Absolute 4.07 10*3/mm3      Lymphocytes, Absolute 3.02 10*3/mm3      Monocytes, Absolute 0.55 10*3/mm3      Eosinophils, Absolute 0.32 10*3/mm3       Basophils, Absolute 0.04 10*3/mm3      Immature Grans, Absolute 0.02 10*3/mm3           I ordered the above labs and reviewed the results    RADIOLOGY  No orders to display      US- negative for DVT or Superficial thrombophlebitis.     I ordered the above noted radiological studies. Interpreted by radiologist. Discussed with (Sparkle). Reviewed by me in PACS.       PROCEDURES  Procedures      PROGRESS AND CONSULTS  ED Course     2137- Initial pt check. I will plan for RUE US to further evaluate the pt. Will f/u with results.     2338- Reviewed pt case with Dr. Valles, who agreed to visit the pt.    2341- I assured pt that her labs came back generally well. Pt US came back with no acute abnormalities as well. I encouraged her to place heat on the area and will start on antibiotics, and it should heal within a few days, as it was likely just an irritation to the vessels due to several IV sticks. Pt will be cleared for discharge. Pt understands and agrees with the plan, all questions answered.    MEDICAL DECISION MAKING  Results were reviewed/discussed with the patient and they were also made aware of online access. Pt also made aware that some labs, such as cultures, will not be resulted during ER visit and follow up with PMD is necessary.     MDM  Number of Diagnoses or Management Options  Phlebitis of right arm:   Diagnosis management comments: No evidence or DVT or cellulitis.        Amount and/or Complexity of Data Reviewed  Clinical lab tests: reviewed  Tests in the radiology section of CPT®: reviewed           DIAGNOSIS  Final diagnoses:   Phlebitis of right arm       DISPOSITION  DISCHARGE    Patient discharged in stable condition.    Reviewed implications of results, diagnosis, meds, responsibility to follow up, warning signs and symptoms of possible worsening, potential complications and reasons to return to ER, including severe infection or fever.    Patient/Family voiced understanding of above  instructions.    Discussed plan for discharge, as there is no emergent indication for admission. Patient referred to primary care provider for BP management due to today's BP. Pt/family is agreeable and understands need for follow up and repeat testing.  Pt is aware that discharge does not mean that nothing is wrong but it indicates no emergency is present that requires admission and they must continue care with follow-up as given below or physician of their choice.     FOLLOW-UP  Carrillo Drake MD  [address] [id number] [phone]    In 3 days  for recheck of symptoms, if no improvement         Medication List      New Prescriptions    sulfamethoxazole-trimethoprim 800-160 MG per tablet  Commonly known as:  BACTRIM DS,SEPTRA DS  Take 2 tablets by mouth 2 (Two) Times a Day.        Stop    SEA SOFT NASAL MIST 0.65 % nasal spray  Generic drug:  sodium chloride     SYNTHROID 25 MCG tablet  Generic drug:  levothyroxine     vitamin D 14368 units capsule capsule  Commonly known as:  ERGOCALCIFEROL              Latest Documented Vital Signs:  As of 11:49 PM  BP- 121/88 HR- 79 Temp- 97.8 °F (36.6 °C) (Oral) O2 sat- 92%    --  Documentation assistance provided by elizabeth Lewis for Olvin Corea.  Information recorded by the scribe was done at my direction and has been verified and validated by me.       Charles Lewis  04/13/18 7453       INDER Mendez  04/14/18 3853

## 2018-04-14 NOTE — ED NOTES
Patient stated to this nurse that she forgot to tell triage that she has an allergy to Sulfa antibiotics, they cause blisters on her tongue. Provider notified for prescription change.     Susan rG RN  04/14/18 0101

## 2018-04-14 NOTE — ED NOTES
"Patient presents to ED with c/o pain, swelling, and discharge from IV site to right AC that was used for testing Monday r/t \"isotope testing.\" Patient states entire right arm from AC to wrist has been hurting and oozing \"pus\" from AC puncture site since Monday. Patient states staff stuck her 5 times and IV infiltrated twice with NS infusion. Patient right AC site has purple/yellow bruising and red scratch with slight swelling with no drainage noted. Pt denies chest pain, weakness in extremities, abdominal pain, dizziness, blurred vision, loss in control of bowel and/or bladder and numbness/tingling of extremities. Pt is alert and oriented X4, PERRLA, respirations are even and unlabored, chest rise and fall is equal in expansion.  Pt does not appear to be in distress at this time     Susan Gr RN  04/13/18 2744    "

## 2018-04-14 NOTE — PROGRESS NOTES
Vascular lab right upper extremity venous doppler complete, prelim negative DVT - INDER [facility] aware

## 2018-06-26 ENCOUNTER — TRANSCRIBE ORDERS (OUTPATIENT)
Dept: CARDIOLOGY | Facility: CLINIC | Age: 58
End: 2018-06-26

## 2018-06-26 DIAGNOSIS — R55 VASOVAGAL SYNCOPE: Primary | ICD-10-CM

## 2018-07-06 ENCOUNTER — APPOINTMENT (OUTPATIENT)
Dept: CARDIOLOGY | Facility: HOSPITAL | Age: 58
End: 2018-07-06

## 2018-07-13 ENCOUNTER — HOSPITAL ENCOUNTER (OUTPATIENT)
Dept: CARDIOLOGY | Facility: HOSPITAL | Age: 58
Discharge: HOME | End: 2018-07-13
Admitting: INTERNAL MEDICINE

## 2018-07-13 PROCEDURE — 93306 TTE W/DOPPLER COMPLETE: CPT

## 2018-07-13 PROCEDURE — 93306 TTE W/DOPPLER COMPLETE: CPT | Performed by: INTERNAL MEDICINE

## 2018-07-14 LAB
AORTIC DIMENSIONLESS INDEX: 0.8 (DI)
BH CV ECHO MEAS - AO MAX PG (FULL): 2 MMHG
BH CV ECHO MEAS - AO MAX PG: 5.1 MMHG
BH CV ECHO MEAS - AO MEAN PG (FULL): 1 MMHG
BH CV ECHO MEAS - AO MEAN PG: 3 MMHG
BH CV ECHO MEAS - AO ROOT AREA (BSA CORRECTED): 1.6
BH CV ECHO MEAS - AO ROOT AREA: 8 CM^2
BH CV ECHO MEAS - AO ROOT DIAM: 3.2 CM
BH CV ECHO MEAS - AO V2 MAX: 113 CM/SEC
BH CV ECHO MEAS - AO V2 MEAN: 76.9 CM/SEC
BH CV ECHO MEAS - AO V2 VTI: 23.5 CM
BH CV ECHO MEAS - ASC AORTA: 3.2 CM
BH CV ECHO MEAS - AVA(I,A): 3 CM^2
BH CV ECHO MEAS - AVA(I,D): 3 CM^2
BH CV ECHO MEAS - AVA(V,A): 3 CM^2
BH CV ECHO MEAS - AVA(V,D): 3 CM^2
BH CV ECHO MEAS - BSA(HAYCOCK): 2.2 M^2
BH CV ECHO MEAS - BSA: 2 M^2
BH CV ECHO MEAS - BZI_BMI: 39.9 KILOGRAMS/M^2
BH CV ECHO MEAS - BZI_METRIC_HEIGHT: 160 CM
BH CV ECHO MEAS - BZI_METRIC_WEIGHT: 102.1 KG
BH CV ECHO MEAS - CONTRAST EF (2CH): 64.6 ML/M^2
BH CV ECHO MEAS - CONTRAST EF 4CH: 67.5 ML/M^2
BH CV ECHO MEAS - EDV(CUBED): 50.7 ML
BH CV ECHO MEAS - EDV(MOD-SP2): 48 ML
BH CV ECHO MEAS - EDV(MOD-SP4): 77 ML
BH CV ECHO MEAS - EDV(TEICH): 58.1 ML
BH CV ECHO MEAS - EF(CUBED): 61.1 %
BH CV ECHO MEAS - EF(MOD-BP): 60 %
BH CV ECHO MEAS - EF(MOD-SP2): 64.6 %
BH CV ECHO MEAS - EF(MOD-SP4): 67.5 %
BH CV ECHO MEAS - EF(TEICH): 53.5 %
BH CV ECHO MEAS - ESV(CUBED): 19.7 ML
BH CV ECHO MEAS - ESV(MOD-SP2): 17 ML
BH CV ECHO MEAS - ESV(MOD-SP4): 25 ML
BH CV ECHO MEAS - ESV(TEICH): 27 ML
BH CV ECHO MEAS - FS: 27 %
BH CV ECHO MEAS - IVS/LVPW: 0.91
BH CV ECHO MEAS - IVSD: 1 CM
BH CV ECHO MEAS - LAT PEAK E' VEL: 6 CM/SEC
BH CV ECHO MEAS - LV DIASTOLIC VOL/BSA (35-75): 37.9 ML/M^2
BH CV ECHO MEAS - LV MASS(C)D: 120.8 GRAMS
BH CV ECHO MEAS - LV MASS(C)DI: 59.4 GRAMS/M^2
BH CV ECHO MEAS - LV MAX PG: 3.1 MMHG
BH CV ECHO MEAS - LV MEAN PG: 2 MMHG
BH CV ECHO MEAS - LV SYSTOLIC VOL/BSA (12-30): 12.3 ML/M^2
BH CV ECHO MEAS - LV V1 MAX: 88.7 CM/SEC
BH CV ECHO MEAS - LV V1 MEAN: 57.7 CM/SEC
BH CV ECHO MEAS - LV V1 VTI: 18.3 CM
BH CV ECHO MEAS - LVIDD: 3.7 CM
BH CV ECHO MEAS - LVIDS: 2.7 CM
BH CV ECHO MEAS - LVLD AP2: 6 CM
BH CV ECHO MEAS - LVLD AP4: 6.8 CM
BH CV ECHO MEAS - LVLS AP2: 4.7 CM
BH CV ECHO MEAS - LVLS AP4: 5.4 CM
BH CV ECHO MEAS - LVOT AREA (M): 3.8 CM^2
BH CV ECHO MEAS - LVOT AREA: 3.8 CM^2
BH CV ECHO MEAS - LVOT DIAM: 2.2 CM
BH CV ECHO MEAS - LVPWD: 1.1 CM
BH CV ECHO MEAS - MED PEAK E' VEL: 6 CM/SEC
BH CV ECHO MEAS - MV A DUR: 0.13 SEC
BH CV ECHO MEAS - MV A MAX VEL: 89.2 CM/SEC
BH CV ECHO MEAS - MV DEC SLOPE: 393 CM/SEC^2
BH CV ECHO MEAS - MV DEC TIME: 0.18 SEC
BH CV ECHO MEAS - MV E MAX VEL: 44.6 CM/SEC
BH CV ECHO MEAS - MV E/A: 0.5
BH CV ECHO MEAS - MV MAX PG: 2.9 MMHG
BH CV ECHO MEAS - MV MEAN PG: 1 MMHG
BH CV ECHO MEAS - MV P1/2T MAX VEL: 74.7 CM/SEC
BH CV ECHO MEAS - MV P1/2T: 55.7 MSEC
BH CV ECHO MEAS - MV V2 MAX: 84.6 CM/SEC
BH CV ECHO MEAS - MV V2 MEAN: 51.8 CM/SEC
BH CV ECHO MEAS - MV V2 VTI: 20.1 CM
BH CV ECHO MEAS - MVA P1/2T LCG: 2.9 CM^2
BH CV ECHO MEAS - MVA(P1/2T): 4 CM^2
BH CV ECHO MEAS - MVA(VTI): 3.5 CM^2
BH CV ECHO MEAS - PA ACC TIME: 0.12 SEC
BH CV ECHO MEAS - PA MAX PG (FULL): 0.11 MMHG
BH CV ECHO MEAS - PA MAX PG: 2.3 MMHG
BH CV ECHO MEAS - PA PR(ACCEL): 25 MMHG
BH CV ECHO MEAS - PA V2 MAX: 76 CM/SEC
BH CV ECHO MEAS - PULM A REVS DUR: 0.07 SEC
BH CV ECHO MEAS - PULM A REVS VEL: 28.1 CM/SEC
BH CV ECHO MEAS - PULM DIAS VEL: 30.6 CM/SEC
BH CV ECHO MEAS - PULM S/D: 1.5
BH CV ECHO MEAS - PULM SYS VEL: 44.6 CM/SEC
BH CV ECHO MEAS - PVA(V,A): 2 CM^2
BH CV ECHO MEAS - PVA(V,D): 2 CM^2
BH CV ECHO MEAS - QP/QS: 0.41
BH CV ECHO MEAS - RV MAX PG: 2.2 MMHG
BH CV ECHO MEAS - RV MEAN PG: 1 MMHG
BH CV ECHO MEAS - RV V1 MAX: 74.2 CM/SEC
BH CV ECHO MEAS - RV V1 MEAN: 49 CM/SEC
BH CV ECHO MEAS - RV V1 VTI: 14.3 CM
BH CV ECHO MEAS - RVOT AREA: 2 CM^2
BH CV ECHO MEAS - RVOT DIAM: 1.6 CM
BH CV ECHO MEAS - SI(AO): 93 ML/M^2
BH CV ECHO MEAS - SI(CUBED): 15.2 ML/M^2
BH CV ECHO MEAS - SI(LVOT): 34.2 ML/M^2
BH CV ECHO MEAS - SI(MOD-SP2): 15.2 ML/M^2
BH CV ECHO MEAS - SI(MOD-SP4): 25.6 ML/M^2
BH CV ECHO MEAS - SI(TEICH): 15.3 ML/M^2
BH CV ECHO MEAS - SV(AO): 189 ML
BH CV ECHO MEAS - SV(CUBED): 31 ML
BH CV ECHO MEAS - SV(LVOT): 69.6 ML
BH CV ECHO MEAS - SV(MOD-SP2): 31 ML
BH CV ECHO MEAS - SV(MOD-SP4): 52 ML
BH CV ECHO MEAS - SV(RVOT): 28.8 ML
BH CV ECHO MEAS - SV(TEICH): 31.1 ML
BH CV ECHO MEAS - TAPSE (>1.6): 2.1 CM2
BH CV ECHO MEASUREMENTS AVERAGE E/E' RATIO: 7.43
BH CV VAS BP RIGHT ARM: NORMAL MMHG
BH CV XLRA - RV BASE: 2.1 CM
BH CV XLRA - TDI S': 12 CM/SEC
LEFT ATRIUM VOLUME INDEX: 21.1 ML/M2
MAXIMAL PREDICTED HEART RATE: 164 BPM
STRESS TARGET HR: 139 BPM

## 2018-08-27 ENCOUNTER — OFFICE VISIT (OUTPATIENT)
Dept: PAIN MEDICINE | Facility: CLINIC | Age: 58
End: 2018-08-27

## 2018-08-27 VITALS
BODY MASS INDEX: 39.94 KG/M2 | OXYGEN SATURATION: 94 % | SYSTOLIC BLOOD PRESSURE: 128 MMHG | WEIGHT: 225.4 LBS | TEMPERATURE: 98.2 F | HEART RATE: 83 BPM | RESPIRATION RATE: 18 BRPM | DIASTOLIC BLOOD PRESSURE: 85 MMHG | HEIGHT: 63 IN

## 2018-08-27 DIAGNOSIS — M79.641 BILATERAL HAND PAIN: ICD-10-CM

## 2018-08-27 DIAGNOSIS — M54.50 CHRONIC BILATERAL LOW BACK PAIN WITHOUT SCIATICA: Primary | ICD-10-CM

## 2018-08-27 DIAGNOSIS — M43.06 LUMBAR SPONDYLOLYSIS: ICD-10-CM

## 2018-08-27 DIAGNOSIS — M79.642 BILATERAL HAND PAIN: ICD-10-CM

## 2018-08-27 DIAGNOSIS — G89.29 CHRONIC BILATERAL LOW BACK PAIN WITHOUT SCIATICA: Primary | ICD-10-CM

## 2018-08-27 PROCEDURE — 99214 OFFICE O/P EST MOD 30 MIN: CPT | Performed by: PAIN MEDICINE

## 2018-08-27 RX ORDER — ERGOCALCIFEROL 1.25 MG/1
CAPSULE ORAL
Refills: 1 | COMMUNITY
Start: 2018-08-01 | End: 2021-05-03

## 2018-08-27 RX ORDER — LUBIPROSTONE 8 UG/1
CAPSULE, GELATIN COATED ORAL
Refills: 2 | COMMUNITY
Start: 2018-08-23 | End: 2021-05-03

## 2018-08-27 RX ORDER — ATORVASTATIN CALCIUM 40 MG/1
TABLET, FILM COATED ORAL
Refills: 1 | COMMUNITY
Start: 2018-08-01 | End: 2021-05-03

## 2018-08-27 NOTE — PROGRESS NOTES
CHIEF COMPLAINT: Back Pain    HPI  Gabi Tomas is a 58 y.o. female.  She is here to follow up for Back Pain    Gabi Tomas is a 58 y.o. female  who presents to the office for follow-up.  She completed a Bilateral L3-5 Lumbar Medial Branch RFA on 11/27/17. Patient reports 100% relief from the procedure for 6 months.  Pain gradually returned and now back to baseline.  She is requesting to repeat RFA. Since last visit their pain has worsened. The patient states their pain is a 3 on a scale of 1-10.  The patient describes this pain as constant dull and ache.  The pain is located in bilateral low back and does not radiate. This painful problem is aggravated by bending, house work and standing and is alleviated by past injection.    Also has a lot of neck and hand pain she wants to discuss. For her hand pain: Diagnosed with autonomic dysfunction. Has been seeing [facility]. Awaiting approval into [place]'s program. Has continued severe constant pain in bilateral hands. Decreased ROM and very limited use due to pain. Worse with any use or touching hands. Has never tried sympathetic blocks. Discussed last visit but she is too nervous to try.     Past pain medications:  Topamax - stopped due to not having migraines any more.   lyrica- makes heart flutter  Savella - rectal bleeding  Mobic 15 mg daily  Lidocaine patches - no help  Excedrin migraines - some help  Compound cream - didn't help     Current pain medications:   Gabapentin 600 mg qid - helps  Cymbalta 60 mg bid  zipsor 25 mg prn - very rarely   Lidocaine patches - helping     Past therapies:  Physical Therapy: yes  Chiropractor: no  Massage Therapy: yes  TENS: no  Neck or back surgery: yes    Previous Injection: Bilateral L3-5 Lumbar Medial Branch RADIOFREQUENCY on 11/27/17  Effect of Injection (%): 100%  Length of Relief: 6 months     PEG Assessment   What number best describes your pain on average in the past week? 7  What number best describes how,  during the past week, pain has interfered with your enjoyment of life? 9  What number best describes how, during the past week, pain has interfered with your general activity? 9      Current Outpatient Prescriptions:   •  ALPRAZolam (XANAX) 0.5 MG tablet, TK 1 T PO  BID PRN, Disp: , Rfl: 5  •  AMITIZA 8 MCG capsule, TK 1 C PO BID WF, Disp: , Rfl: 2  •  atorvastatin (LIPITOR) 40 MG tablet, TK 1 T PO HS, Disp: , Rfl: 1  •  DULoxetine (CYMBALTA) 60 MG capsule, TAKE ONE CAPSULE BY MOUTH TWICE DAILY, Disp: 180 capsule, Rfl: 0  •  fexofenadine (ALLEGRA) 180 MG tablet, Take 1 tablet by mouth Daily., Disp: 30 tablet, Rfl: 12  •  fluticasone (FLONASE) 50 MCG/ACT nasal spray, 2 sprays into each nostril Daily., Disp: , Rfl:   •  gabapentin (NEURONTIN) 600 MG tablet, 2 tid, Disp: 180 tablet, Rfl: 1  •  HYDROcodone-acetaminophen (NORCO) 7.5-325 MG per tablet, TK 1 T PO  Q 6-8 H PRN P, Disp: , Rfl: 0  •  ibuprofen (ADVIL,MOTRIN) 800 MG tablet, Take 800 mg by mouth Every 8 (Eight) Hours As Needed for Mild Pain ., Disp: , Rfl:   •  montelukast (SINGULAIR) 10 MG tablet, Take 10 mg by mouth., Disp: , Rfl:   •  omeprazole (priLOSEC) 40 MG capsule, Take 40 mg by mouth., Disp: , Rfl:   •  pilocarpine (SALAGEN) 5 MG tablet, Take 5 mg by mouth., Disp: , Rfl:   •  solifenacin (VESICARE) 5 MG tablet, Take 5 mg by mouth., Disp: , Rfl:   •  vitamin D (ERGOCALCIFEROL) 67454 units capsule capsule, TK 1 C PO WEEKLY, Disp: , Rfl: 1    IMAGING  Neuro qsart 10/2014:  QSART responses at the proximal leg and distal leg are reduced  but the responses elsewhere are normal.  These findings are  nonspecific for etiology but are consistent with a postganglionic  sympathetic sudomotor abnormality like that seen in  autonomic/small fiber neuropathy. However, sparing of the foot is  not typical of length-dependent axonopathy.      Neuro cardio autonomic reflex w/wo tilt 10/2014:  Heart rate response to deep breathing is borderline to mildly  reduced via the  "mean heart rate range and the E:I ratio.  Heart  rate response to the Valsalva maneuver, as assessed by the  Valsalva ratio, is mildly reduced but the blood pressure  responses to phase II and phase IV of the maneuver are normal.   During 10 minutes of 60 degrees head-up tilt, heart rate and  blood pressure responses were normal.      This cardiovascular autonomic test panel is notable for a  reduction of the heart rate response to deep breathing and the  Valsalva ratio. These findings are non-specific for etiology but  suggest cardiovagal impairment.     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     No imaging of low back    Imaging last reviewed: 08/27/18     PFSH:  The following portions of the patient's history were reviewed and updated as appropriate: problem list, past medical history, past surgery history, social history, family history, medications, and allergies    Review of Systems   Constitutional: Positive for fatigue.   HENT: Positive for congestion.    Eyes: Negative for visual disturbance.   Respiratory: Positive for shortness of breath. Negative for cough and wheezing.    Cardiovascular: Negative.    Gastrointestinal: Positive for constipation. Negative for diarrhea.   Genitourinary: Negative for difficulty urinating.   Musculoskeletal: Positive for back pain and neck pain.   Neurological: Positive for weakness and numbness.   Psychiatric/Behavioral: Positive for sleep disturbance. Negative for suicidal ideas. The patient is nervous/anxious.    All other systems reviewed and are negative.      Vitals:    08/27/18 0933   BP: 128/85   Pulse: 83   Resp: 18   Temp: 98.2 °F (36.8 °C)   SpO2: 94%   Weight: 102 kg (225 lb 6.4 oz)   Height: 160 cm (63\")   PainSc:   3   PainLoc: Back       Physical Exam   Constitutional: " She is oriented to person, place, and time. She appears well-developed and well-nourished. No distress.   HENT:   Head: Normocephalic and atraumatic.   Nose: Nose normal.   Mouth/Throat: Oropharynx is clear and moist.   Eyes: Conjunctivae and EOM are normal.   Neck: Normal range of motion. Neck supple.   Pulmonary/Chest: Effort normal. No stridor.   Musculoskeletal:        Lumbar back: She exhibits decreased range of motion, tenderness and pain.        Right hand: She exhibits decreased range of motion, tenderness and deformity. She exhibits no swelling. Decreased sensation noted. Decreased strength noted.        Left hand: She exhibits decreased range of motion, tenderness and deformity. She exhibits no swelling. Decreased sensation noted. Decreased strength noted.   +bilateral lumbar facet tenderness  +bilateral lumbar facet loading   +worse pain in low back with lumbar flexion and twisting   Neurological: She is alert and oriented to person, place, and time. She has normal strength. No cranial nerve deficit or sensory deficit.   Skin: Skin is warm and dry. No laceration noted. She is not diaphoretic.   Psychiatric: She has a normal mood and affect. Her speech is normal and behavior is normal.   Nursing note and vitals reviewed.    Ortho Exam  Neurologic Exam     Mental Status   Oriented to person, place, and time.   Speech: speech is normal     Cranial Nerves     CN III, IV, VI   Extraocular motions are normal.     Motor Exam     Strength   Strength 5/5 throughout.       Lab Results   Component Value Date    POCMETH Negative 12/08/2016    POCAMPHET Negative 12/08/2016    POCBARBITUR Negative 12/08/2016    POCBENZO Negative 12/08/2016    POCCOCAINE Negative 12/08/2016    POCMETHADO Negative 12/08/2016    POCOPIATES Negative 12/08/2016    POCOXYCODO Negative 12/08/2016    POCPHENCYC Negative 12/08/2016    POCPROPOXY Negative 12/08/2016    POCTHC Negative 12/08/2016    POCTRICYC Negative 12/08/2016     Last UDS  results reviewed: 08/27/18   Last UDS: 12/8/2016  Comments: Consistent     Date of last JAVI reviewed : 08/27/18   Comments: Bishop Ashley was seen today for back pain.    Diagnoses and all orders for this visit:    Chronic bilateral low back pain without sciatica  -     Case Request    Lumbar spondylolysis  -     Case Request      Requested Prescriptions      No prescriptions requested or ordered in this encounter     - good relief with bilateral L2-L5 RFA in past. Will repeat. Discussed with the patient regarding the etiology of their pain. Informed them that they would likely benefit from a bilateral radiofrequency ablation of the lumbar medial branch nerves from L2 to L5.  The procedure was described in detail and the risks, benefits and alternatives were discussed with the patient (including but not limited to: bleeding, infection, nerve damage, worsening of pain, inability to perform injection, paralysis, seizures, and death) who agreed to proceed.  Will perform under sedation. Instructed not to eat and have a .    - timing wise - it may be end of Oct before she can have performed due to son breaking ankle and needing transportation.     For her bilateral hand pain:  - She would likely benefit from a cervical sympathetic nerve block.  Given she has bilateral hand pain she would likely need bilateral stellate ganglion blocks.  Given her last hand pain is the worst pain will start with a left stellate ganglion sympathetic nerve block to see if she can't receive any pain relief.  - Discussed with the patient regarding the etiology of their pain. Informed them that they would likely benefit from a Left stellate ganglion/sympathetic block.  The procedure was described in detail and the risks, benefits and alternatives were discussed with the patient (including but not limited to: PTX, facial flushing, droopy eye, bleeding, infection, nerve damage, worsening of pain, inability to perform  injection, paralysis, seizures, and death) who agreed to proceed. We'll perform under sedation.    - She is nervous to try stellate ganglion block. She will consider and discuss in future. Handouts with info given to patient.   - Can consider spinal cord stimulator in future.       Wt Readings from Last 3 Encounters:   08/27/18 102 kg (225 lb 6.4 oz)   04/13/18 102 kg (225 lb)   03/15/18 100 kg (221 lb)     Body mass index is 39.93 kg/m². Patient counseled on the importance of weight loss to help with overall health and pain control. Patient instructed to attempt weight loss.   Plan: Calorie counting  eat more fruits and vegetables, reduce portion size, cut out extra servings and reduce fast food intake    Follow-up     Emilee Hickman MD  Pain Management

## 2018-08-27 NOTE — PATIENT INSTRUCTIONS
- Discussed with the patient regarding the etiology of their pain. Informed them that they would likely benefit from a Left stellate ganglion/sympathetic block.  The procedure was described in detail and the risks, benefits and alternatives were discussed with the patient (including but not limited to: PTX, facial flushing, droopy eye, bleeding, infection, nerve damage, worsening of pain, inability to perform injection, paralysis, seizures, and death) who agreed to proceed.     Sympathetic Nerve Block  Your sympathetic nerves control certain processes in your body that happen without your thinking about them, like sweating. A sympathetic nerve block is a procedure to numb these nerves. It may be done to check if pain is caused by damaged sympathetic nerves. It may also be done to relieve pain that is caused by damaged sympathetic nerves.  During this procedure, a health care provider will inject a numbing medicine (anesthetic) into a clump of sympathetic nerves. The place where he or she injects the medicine will depend on where you have pain:  · If your pain is in your upper body, the medicine may be injected into nerves in your neck.  · If your pain is in your abdomen, the medicine may be injected into nerves in the middle of your back.  · If your pain is in your lower body, the medicine may be injected into nerves in your lower back.    Pain that is caused by damaged sympathetic nerves will go away for a period of time after the block. The pain relief usually ends when the medicine wears off, but sometimes it continues for longer.  Tell a health care provider about:  · Any allergies you have.  · All medicines you are taking, including vitamins, herbs, eye drops, creams, and over-the-counter medicines.  · Any problems you or family members have had with anesthetic medicines.  · Any blood disorders you have.  · Any surgeries you have had.  · Any medical conditions you have.  · Whether you are pregnant or may be  pregnant.  What are the risks?  Generally, this is a safe procedure. However, problems may occur, including:  · Failure of the procedure to relieve your pain.  · Feeling worse pain than you did before.  · Bleeding.  · Allergic reactions to medicines.  · Infection.  · Blood vessel damage.  · Nerve damage.    What happens before the procedure?  Medicines  Ask your health care provider about:  · Changing or stopping your regular medicines. This is especially important if you are taking diabetes medicines or blood thinners.  · Taking medicines such as aspirin and ibuprofen. These medicines can thin your blood. Do not take these medicines before your procedure if your health care provider instructs you not to.    Staying hydrated  Follow instructions from your health care provider about hydration, which may include:  · Up to 2 hours before the procedure - you may continue to drink clear liquids, such as water, clear fruit juice, black coffee, and plain tea.    Eating and drinking restrictions  Follow instructions from your health care provider about eating and drinking, which may include:  · 8 hours before the procedure - stop eating heavy meals or foods such as meat, fried foods, or fatty foods.  · 6 hours before the procedure - stop eating light meals or foods, such as toast or cereal.  · 6 hours before the procedure - stop drinking milk or drinks that contain milk.  · 2 hours before the procedure - stop drinking clear liquids.    General instructions    · Plan to have someone take you home from the hospital or clinic.  What happens during the procedure?  · An IV tube will be inserted in one of your veins.  · You will be given one or more of the following:  ? A medicine to help you relax (sedative).  ? A medicine to numb the area (local anesthetic).  · A needle will be inserted into the numbed area.  · An imaging tool, like an X-ray or CT scan, will be used to help your health care provider put the needle in the right  position.  · The anesthetic for the block will be injected into the nerves.  · The needle will be removed.  · The IV tube may be removed next.  · Small bandages (dressings) may be placed over the area where the needle was put in.  The procedure may vary among health care providers and hospitals.  What happens after the procedure?  · Your blood pressure, heart rate, breathing rate, and blood oxygen level will be monitored until the medicines you were given have worn off.  · You may notice redness in the area where the medicine was injected.  · Do not drive for 24 hours if you received a sedative.  This information is not intended to replace advice given to you by your health care provider. Make sure you discuss any questions you have with your health care provider.  Document Released: 06/23/2004 Document Revised: 05/26/2017 Document Reviewed: 04/13/2017  Elsevier Interactive Patient Education © 2018 [employer]

## 2019-03-27 ENCOUNTER — TELEPHONE (OUTPATIENT)
Dept: OBSTETRICS AND GYNECOLOGY | Age: 59
End: 2019-03-27

## 2019-03-28 ENCOUNTER — OFFICE VISIT (OUTPATIENT)
Dept: OBSTETRICS AND GYNECOLOGY | Age: 59
End: 2019-03-28

## 2019-03-28 VITALS
BODY MASS INDEX: 39.87 KG/M2 | HEIGHT: 63 IN | SYSTOLIC BLOOD PRESSURE: 125 MMHG | WEIGHT: 225 LBS | DIASTOLIC BLOOD PRESSURE: 72 MMHG

## 2019-03-28 DIAGNOSIS — K64.2 GRADE III HEMORRHOIDS: ICD-10-CM

## 2019-03-28 DIAGNOSIS — M84.469D ORTHOPEDIC AFTERCARE FOR HEALING PATHOLOGIC LEG FRACTURE: ICD-10-CM

## 2019-03-28 DIAGNOSIS — R10.2 PELVIC PAIN: Primary | ICD-10-CM

## 2019-03-28 DIAGNOSIS — N94.9 UTERINE TENDERNESS: ICD-10-CM

## 2019-03-28 DIAGNOSIS — Z11.51 SCREENING FOR HUMAN PAPILLOMAVIRUS: ICD-10-CM

## 2019-03-28 DIAGNOSIS — Z12.4 SCREENING FOR MALIGNANT NEOPLASM OF CERVIX: ICD-10-CM

## 2019-03-28 DIAGNOSIS — R10.2 PELVIC PAIN IN FEMALE: Primary | ICD-10-CM

## 2019-03-28 DIAGNOSIS — R10.30 LOWER ABDOMINAL PAIN: ICD-10-CM

## 2019-03-28 DIAGNOSIS — Z00.00 WELLNESS EXAMINATION: ICD-10-CM

## 2019-03-28 PROBLEM — N95.0 PMB (POSTMENOPAUSAL BLEEDING): Status: RESOLVED | Noted: 2019-03-28 | Resolved: 2019-03-28

## 2019-03-28 PROBLEM — N95.0 PMB (POSTMENOPAUSAL BLEEDING): Status: ACTIVE | Noted: 2019-03-28

## 2019-03-28 PROCEDURE — 99203 OFFICE O/P NEW LOW 30 MIN: CPT | Performed by: OBSTETRICS & GYNECOLOGY

## 2019-03-28 NOTE — PROGRESS NOTES
Subjective     Chief Complaint   Patient presents with   • Gynecologic Exam     new pt / severe cramping , lower abd pain , back pain        History of Present Illness  Gabi Tmoas is a 59 y.o. female is being seen today for onset of lower abdominal and pelvic pain several days ago.  Patient is postmenopausal for 7 years.  She states she is recently had what she describes as uterine cramping and tenderness.  She has not had any postmenopausal bleeding.  She does have a history of cervical dysplasia.  She underwent a cervical conization in the past.  States no change in her bowel movements or voiding patterns.  A urinalysis was clean today.  She is overdue for her DEXA scan mammogram and Pap smear.  Patient has multiple medical issues going on most significantly she has undiagnosed movement disorder which they are thinking might be Parkinson's disease.  She also has a history of chronic hemorrhoids.  She has not noticed any blood in her stool.  She had onset of menarche at age 14 they were every month until age 52 and she has been postmenopausal since  Chief Complaint   Patient presents with   • Gynecologic Exam     new pt / severe cramping , lower abd pain , back pain    .        The following portions of the patient's history were reviewed and updated as appropriate: allergies, current medications, past family history, past medical history, past social history, past surgical history and problem list.    PAST MEDICAL HISTORY  Past Medical History:   Diagnosis Date   • Allergic    • Anemia    • Anxiety    • Arthritis    • Cancer (CMS/HCC)    • Cervical dysplasia     Conization in the past   • Depression    • Eating disorder    • Fibromyalgia, primary    • GERD (gastroesophageal reflux disease)    • Headache    • Hyperlipidemia    • Hypothyroidism    • Low back pain    • Obesity    • Orthostatic hypertension    • Peptic ulceration    • Peripheral neuropathy    • Tremor    • Urinary tract infection    • Visual  impairment      OB History     No data available        Past Surgical History:   Procedure Laterality Date   • CERVICAL CONIZATION     • DILATATION AND CURETTAGE       Family History   Problem Relation Age of Onset   • Lymphoma Mother    • Depression Mother    • Cancer Father    • Heart attack Father    • Psoriasis Father    • Alcohol abuse Father    • Aneurysm Brother    • COPD Brother    • Heart disease Brother    • Heart attack Brother    • Cancer Maternal Aunt    • Cancer Maternal Uncle    • Cancer Paternal Aunt    • Heart disease Paternal Aunt    • Cancer Paternal Uncle    • Heart disease Paternal Uncle    • Aneurysm Brother    • Lung disease Brother    • Alcohol abuse Brother    • ADD / ADHD Son      Social History     Tobacco Use   Smoking Status Former Smoker   • Packs/day: 0.50   • Years: 42.00   • Pack years: 21.00   • Types: Cigarettes   • Last attempt to quit: 10/1/2016   • Years since quittin.4   Smokeless Tobacco Never Used       Current Outpatient Medications:   •  ALPRAZolam (XANAX) 0.5 MG tablet, TK 1 T PO  BID PRN, Disp: , Rfl: 5  •  AMITIZA 8 MCG capsule, TK 1 C PO BID WF, Disp: , Rfl: 2  •  DULoxetine (CYMBALTA) 60 MG capsule, TAKE ONE CAPSULE BY MOUTH TWICE DAILY, Disp: 180 capsule, Rfl: 0  •  fexofenadine (ALLEGRA) 180 MG tablet, Take 1 tablet by mouth Daily., Disp: 30 tablet, Rfl: 12  •  fluticasone (FLONASE) 50 MCG/ACT nasal spray, 2 sprays into each nostril Daily., Disp: , Rfl:   •  gabapentin (NEURONTIN) 600 MG tablet, 2 tid, Disp: 180 tablet, Rfl: 1  •  HYDROcodone-acetaminophen (NORCO) 7.5-325 MG per tablet, TK 1 T PO  Q 6-8 H PRN P, Disp: , Rfl: 0  •  ibuprofen (ADVIL,MOTRIN) 800 MG tablet, Take 800 mg by mouth Every 8 (Eight) Hours As Needed for Mild Pain ., Disp: , Rfl:   •  montelukast (SINGULAIR) 10 MG tablet, Take 10 mg by mouth., Disp: , Rfl:   •  omeprazole (priLOSEC) 40 MG capsule, Take 40 mg by mouth., Disp: , Rfl:   •  pilocarpine (SALAGEN) 5 MG tablet, Take 5 mg by mouth.,  Disp: , Rfl:   •  solifenacin (VESICARE) 5 MG tablet, Take 5 mg by mouth., Disp: , Rfl:   •  vitamin D (ERGOCALCIFEROL) 06995 units capsule capsule, TK 1 C PO WEEKLY, Disp: , Rfl: 1  •  atorvastatin (LIPITOR) 40 MG tablet, TK 1 T PO HS, Disp: , Rfl: 1    There is no immunization history on file for this patient.    Review of Systems   Constitutional: Positive for fatigue.   HENT: Positive for congestion.    Respiratory: Negative.    Cardiovascular: Negative.    Gastrointestinal: Positive for abdominal pain. Negative for abdominal distention, anal bleeding, blood in stool, constipation, diarrhea, nausea, rectal pain and vomiting.   Endocrine: Negative for cold intolerance, heat intolerance, polydipsia, polyphagia and polyuria.   Genitourinary: Positive for pelvic pain. Negative for difficulty urinating, dyspareunia, hematuria and menstrual problem.   Musculoskeletal: Positive for arthralgias, gait problem and myalgias.   Neurological: Negative for seizures and speech difficulty.   Hematological: Negative for adenopathy. Does not bruise/bleed easily.   Psychiatric/Behavioral: Negative for confusion and dysphoric mood.          Except as outlined in history of physical illness, patient denies any changes in her GYN, , GI systems. All other systems reviewed are negative.    Objective   Physical Exam   Alert and oriented, respirations unlabored, heart regular rate and rhythm, breasts are even and symmetrical without masses.  Abdomen is soft nontender no rebound no guarding except in the lower midline area with deep palpation over the uterus.   Pelvic external genitalia mildly atrophied vagina is clean dry and intact without masses cervix is very high small without lesions.  Pap smear was obtained uterus is difficult to palpate given patient's body mass index.  There is some tenderness with palpation.  Adnexa are nonpalpable rectal exam reveals hemorrhoids.  They are not bleeding.  There are no masses  rectally.      Assessment/Plan   Gabi was seen today for gynecologic exam.    Diagnoses and all orders for this visit:    Pelvic pain in female  -     Cancel: US Pelvis Complete; Future  -     Cancel: US Testicular or Ovarian Vascular Limited; Future  -     US Pelvis Complete; Future  -     US Testicular or Ovarian Vascular Limited; Future    Lower abdominal pain  -     CT Abdomen Pelvis With & Without Contrast; Future  -     Mammo Screening Bilateral With CAD; Future  -     US Pelvis Complete; Future  -     US Testicular or Ovarian Vascular Limited; Future    Orthopedic aftercare for healing pathologic leg fracture  -     DEXA Bone Density Axial; Future    Uterine tenderness    Grade III hemorrhoids    Will await results of ultrasound and CT scan.  To help further guide our workup.             EMR Dragon/ Transcription disclaimer:  Much of the encounter note is an electronic transcription/translation of spoken language to printed text. The electronic translation of spoken language may permit erroneous, or at times, nonessential words or phrases to be inadvertently transcribes; Although i have reviewed the note for such errors, some may still exist.

## 2019-03-29 ENCOUNTER — HOSPITAL ENCOUNTER (OUTPATIENT)
Dept: ULTRASOUND IMAGING | Facility: HOSPITAL | Age: 59
Discharge: HOME | End: 2019-03-29
Admitting: OBSTETRICS & GYNECOLOGY

## 2019-03-29 ENCOUNTER — HOSPITAL ENCOUNTER (OUTPATIENT)
Dept: CT IMAGING | Facility: HOSPITAL | Age: 59
Discharge: HOME | End: 2019-03-29

## 2019-03-29 ENCOUNTER — TRANSCRIBE ORDERS (OUTPATIENT)
Dept: ADMINISTRATIVE | Facility: HOSPITAL | Age: 59
End: 2019-03-29

## 2019-03-29 DIAGNOSIS — R10.2 PELVIC PAIN: ICD-10-CM

## 2019-03-29 DIAGNOSIS — R10.2 PELVIC PAIN IN FEMALE: ICD-10-CM

## 2019-03-29 DIAGNOSIS — Z12.31 VISIT FOR SCREENING MAMMOGRAM: Primary | ICD-10-CM

## 2019-03-29 DIAGNOSIS — R10.30 LOWER ABDOMINAL PAIN: ICD-10-CM

## 2019-03-29 LAB — CREAT BLDA-MCNC: 0.8 MG/DL (ref 0.6–1.3)

## 2019-03-29 PROCEDURE — 74177 CT ABD & PELVIS W/CONTRAST: CPT

## 2019-03-29 PROCEDURE — 76856 US EXAM PELVIC COMPLETE: CPT

## 2019-03-29 PROCEDURE — 25010000002 IOPAMIDOL 61 % SOLUTION: Performed by: NURSE PRACTITIONER

## 2019-03-29 PROCEDURE — 76830 TRANSVAGINAL US NON-OB: CPT

## 2019-03-29 PROCEDURE — 82565 ASSAY OF CREATININE: CPT

## 2019-03-29 RX ADMIN — IOPAMIDOL 85 ML: 612 INJECTION, SOLUTION INTRAVENOUS at 15:49

## 2019-04-01 ENCOUNTER — DOCUMENTATION (OUTPATIENT)
Dept: OBSTETRICS AND GYNECOLOGY | Age: 59
End: 2019-04-01

## 2019-04-01 ENCOUNTER — TELEPHONE (OUTPATIENT)
Dept: OBSTETRICS AND GYNECOLOGY | Age: 59
End: 2019-04-01

## 2019-04-01 PROBLEM — N88.9 CERVICAL LESION: Status: ACTIVE | Noted: 2019-04-01

## 2019-04-01 PROBLEM — N20.0 RENAL STONE: Status: ACTIVE | Noted: 2019-04-01

## 2019-04-01 LAB
CYTOLOGIST CVX/VAG CYTO: NORMAL
CYTOLOGY CVX/VAG DOC THIN PREP: NORMAL
DX ICD CODE: NORMAL
HIV 1 & 2 AB SER-IMP: NORMAL
HPV I/H RISK 4 DNA CVX QL PROBE+SIG AMP: NEGATIVE
OTHER STN SPEC: NORMAL
PATH REPORT.FINAL DX SPEC: NORMAL
STAT OF ADQ CVX/VAG CYTO-IMP: NORMAL

## 2019-04-01 NOTE — TELEPHONE ENCOUNTER
DR APODACA [phone] AT [facility] WOULD LIKE TO SPEAK TO YOU REG THIS PT'S CT SCAN. ORIGINALLY ASKED FOR ROZ BUT I SAID SHE WASN'T HERE TODAY.

## 2019-04-01 NOTE — PROGRESS NOTES
I had called the patient this morning to check to see how she was doing.  Pain is more localized to the left.  She is afebrile normal  and GI function.  Ultrasound was normal except for the presence of a well-circumscribed area in the posterior portion of the cervix or potentially in the lower uterine segment.  I called the radiologist to further discuss this.  He suggested it might represent a fibroid or endometrioma but stressed the fact that it was well-circumscribed.  [place] of state at present.  Will have her return to the office so we can repeat our own GYN ultrasound and take precautions for kidney stones.

## 2019-04-01 NOTE — PROGRESS NOTES
I have called the patient to check on her.  Her pain is no longer midline but a little bit more on the left side.  CT scan is not officially read but it is suggestive of a left renal stone.  I will call the patient again myself.

## 2019-04-02 ENCOUNTER — DOCUMENTATION (OUTPATIENT)
Dept: OBSTETRICS AND GYNECOLOGY | Age: 59
End: 2019-04-02

## 2019-04-02 NOTE — PROGRESS NOTES
I have called the patient again this morning so she is feeling a lot better.  We reviewed the findings on ultrasound and CAT scan including right-sided renal calculus.  Also discussed the possibility of an endometrioma fibroid or cyst in her lower uterus or cervix.  As she is asymptomatic patient wants to wait for 6 weeks and return to our office so that we can do an ultrasound here and reexamine to better evaluate that.  She will call us if anything changes

## 2019-04-03 ENCOUNTER — TELEPHONE (OUTPATIENT)
Dept: OBSTETRICS AND GYNECOLOGY | Age: 59
End: 2019-04-03

## 2019-04-03 ENCOUNTER — HOSPITAL ENCOUNTER (EMERGENCY)
Dept: HOSPITAL 62 - ER | Age: 59
Discharge: HOME | End: 2019-04-03
Payer: MEDICARE

## 2019-04-03 VITALS — SYSTOLIC BLOOD PRESSURE: 136 MMHG | DIASTOLIC BLOOD PRESSURE: 77 MMHG

## 2019-04-03 DIAGNOSIS — R19.7: ICD-10-CM

## 2019-04-03 DIAGNOSIS — Z87.891: ICD-10-CM

## 2019-04-03 DIAGNOSIS — Z88.6: ICD-10-CM

## 2019-04-03 DIAGNOSIS — Z88.0: ICD-10-CM

## 2019-04-03 DIAGNOSIS — R14.0: ICD-10-CM

## 2019-04-03 DIAGNOSIS — R10.84: Primary | ICD-10-CM

## 2019-04-03 DIAGNOSIS — M54.5: ICD-10-CM

## 2019-04-03 LAB
ADD MANUAL DIFF: NO
ALBUMIN SERPL-MCNC: 4.4 G/DL (ref 3.5–5)
ALP SERPL-CCNC: 86 U/L (ref 38–126)
ALT SERPL-CCNC: 44 U/L (ref 9–52)
ANION GAP SERPL CALC-SCNC: 7 MMOL/L (ref 5–19)
APPEARANCE UR: (no result)
APTT PPP: (no result) S
AST SERPL-CCNC: 41 U/L (ref 14–36)
BASOPHILS # BLD AUTO: 0.1 10^3/UL (ref 0–0.2)
BASOPHILS NFR BLD AUTO: 1.6 % (ref 0–2)
BILIRUB DIRECT SERPL-MCNC: 0.4 MG/DL (ref 0–0.4)
BILIRUB SERPL-MCNC: 0.5 MG/DL (ref 0.2–1.3)
BILIRUB UR QL STRIP: (no result)
BUN SERPL-MCNC: 12 MG/DL (ref 7–20)
CALCIUM: 10.3 MG/DL (ref 8.4–10.2)
CHLORIDE SERPL-SCNC: 105 MMOL/L (ref 98–107)
CO2 SERPL-SCNC: 29 MMOL/L (ref 22–30)
EOSINOPHIL # BLD AUTO: 0.2 10^3/UL (ref 0–0.6)
EOSINOPHIL NFR BLD AUTO: 3 % (ref 0–6)
ERYTHROCYTE [DISTWIDTH] IN BLOOD BY AUTOMATED COUNT: 14 % (ref 11.5–14)
GLUCOSE SERPL-MCNC: 96 MG/DL (ref 75–110)
GLUCOSE UR STRIP-MCNC: NEGATIVE MG/DL
HCT VFR BLD CALC: 45.6 % (ref 36–47)
HGB BLD-MCNC: 15.7 G/DL (ref 12–15.5)
KETONES UR STRIP-MCNC: (no result) MG/DL
LIPASE SERPL-CCNC: 190.6 U/L (ref 23–300)
LYMPHOCYTES # BLD AUTO: 2.7 10^3/UL (ref 0.5–4.7)
LYMPHOCYTES NFR BLD AUTO: 42.5 % (ref 13–45)
MCH RBC QN AUTO: 29.8 PG (ref 27–33.4)
MCHC RBC AUTO-ENTMCNC: 34.4 G/DL (ref 32–36)
MCV RBC AUTO: 87 FL (ref 80–97)
MONOCYTES # BLD AUTO: 0.5 10^3/UL (ref 0.1–1.4)
MONOCYTES NFR BLD AUTO: 7.9 % (ref 3–13)
NEUTROPHILS # BLD AUTO: 2.9 10^3/UL (ref 1.7–8.2)
NEUTS SEG NFR BLD AUTO: 45 % (ref 42–78)
NITRITE UR QL STRIP: NEGATIVE
PH UR STRIP: 5 [PH] (ref 5–9)
PLATELET # BLD: 290 10^3/UL (ref 150–450)
POTASSIUM SERPL-SCNC: 4.6 MMOL/L (ref 3.6–5)
PROT SERPL-MCNC: 7.1 G/DL (ref 6.3–8.2)
PROT UR STRIP-MCNC: NEGATIVE MG/DL
RBC # BLD AUTO: 5.26 10^6/UL (ref 3.72–5.28)
SODIUM SERPL-SCNC: 140.8 MMOL/L (ref 137–145)
SP GR UR STRIP: 1.02
TOTAL CELLS COUNTED % (AUTO): 100 %
UROBILINOGEN UR-MCNC: 4 MG/DL (ref ?–2)
WBC # BLD AUTO: 6.4 10^3/UL (ref 4–10.5)

## 2019-04-03 PROCEDURE — 96374 THER/PROPH/DIAG INJ IV PUSH: CPT

## 2019-04-03 PROCEDURE — 83690 ASSAY OF LIPASE: CPT

## 2019-04-03 PROCEDURE — 80053 COMPREHEN METABOLIC PANEL: CPT

## 2019-04-03 PROCEDURE — 81001 URINALYSIS AUTO W/SCOPE: CPT

## 2019-04-03 PROCEDURE — 99284 EMERGENCY DEPT VISIT MOD MDM: CPT

## 2019-04-03 PROCEDURE — 96375 TX/PRO/DX INJ NEW DRUG ADDON: CPT

## 2019-04-03 PROCEDURE — 85025 COMPLETE CBC W/AUTO DIFF WBC: CPT

## 2019-04-03 PROCEDURE — 74177 CT ABD & PELVIS W/CONTRAST: CPT

## 2019-04-03 PROCEDURE — 87086 URINE CULTURE/COLONY COUNT: CPT

## 2019-04-03 PROCEDURE — 36415 COLL VENOUS BLD VENIPUNCTURE: CPT

## 2019-04-03 NOTE — ER DOCUMENT REPORT
ED General





- General


Chief Complaint: Abdominal Pain


Stated Complaint: ABDOMINAL PAIN


Time Seen by Provider: 19 18:15


Mode of Arrival: Ambulatory


Information source: Patient


TRAVEL OUTSIDE OF THE [place] IN LAST 30 DAYS: No





- HPI


Patient complains to provider of: All over abdominal pain, bad diarrhea, and 

extreme low back pain


Onset: Last week


Onset/Duration: Sudden


Quality of pain: Sharp, Stabbing


Severity: Severe


Pain Level: 5


Associated symptoms: Diarrhea.  denies: Chills, Fever, Nausea, Vomiting


Exacerbated by: Denies


Relieved by: Denies


Similar symptoms previously: No


Recently seen / treated by doctor: No


Notes: 





59-year-old  female here with all over abdominal pain, distention, 

foul-smelling diarrhea.  Patient started having symptoms of low abdominal/pelvic

cramping and discomfort and low back pain back in [place] last week.  She 

states that she went into see her OB/GYN and she had a Pap smear/pelvic exam, 

had an pelvic ultrasound and also had a CT abdomen and pelvis with IV contrast. 

She was not aware of any of her scan or ultrasound results before she left 

[place] to come down here to visit her son who is in the .





- Related Data


Allergies/Adverse Reactions: 


                                        





ampicillin Allergy (Verified 19 18:04)


   


milnacipran [From Savella] Allergy (Verified 19 18:04)


   


Quinolones Allergy (Verified 19 18:04)


   











Past Medical History





- General


Information source: Patient





- Social History


Smoking Status: Former Smoker


Chew tobacco use (# tins/day): No


Frequency of alcohol use: None


Drug Abuse: None


Family History: Reviewed & Not Pertinent


Patient has suicidal ideation: No


Patient has homicidal ideation: No


Renal/ Medical History: Denies: Hx Peritoneal Dialysis


Past Surgical History: Reports: Hx  Section, Hx Genitourinary Surgery - 

D & C, Hx Orthopedic Surgery - lower back





Review of Systems





- Review of Systems


Notes: 





Constitutional:  No fevers. No chills.





EENT: No eye redness. No eye pain. No ear pain. No sore throat.





Cardiovascular:  No chest pain. No palpitations.





Respiratory: No cough. No shortness of breath. No respiratory distress.





Gastrointestinal: Positive for abdominal pain. No nausea, vomiting.  Foul-

smelling diarrhea





Genitourinary: Atraumatic. No lesions. No pain. No discharge.





Musculoskeletal: Atraumatic. No swelling. No deformities.





Skin: No rash or lesions.





Lymphatic: No swollen lymph nodes.





Neurologic: No headache. No syncope.





Psychiatric: No suicidal or homicidal ideation.





Physical Exam





- Vital signs


Vitals: 


                                        











Temp Pulse Resp BP Pulse Ox


 


 98.5 F   72   18   136/77 H  94 


 


 19 18:07  19 18:07  19 18:07  19 18:07  19 18:07














- Notes


Notes: 





General: Well-developed, well-nourished. Non-toxic appearing.  In obvious 

discomfort.





Cardiac: Well-perfused. Regular rate and rhythm. No murmurs, rubs, or gallops. 





Pulmonary: No respiratory distress. No cyanosis. Bilateral lung fields are clear

to auscultation.





Abdominal: Abdomen is obese.  Does look somewhat distended however.  Bowel 

sounds are present in all 4 quadrants.  Upper abdomen is diffusely tender with 

greater tenderness in the epigastrium and right upper quadrant.





HEENT: Head is atraumatic. Conjunctivae not reddened. No tearing. PERRL. EOMI. 

Orbits atraumatic. No periorbital swelling or erythema. Oropharynx is without 

erythema, swelling, or exudates.





Neck: Supple. No adenopathy. No meningismus.





Dermatologic: Warm with good turgor. No rash. Atraumatic.





Chest: Atraumatic. No chest wall tenderness to palpation.





Musculoskeletal: Moves all extremities well. No range of motion deficits. no 

muscular or joint tenderness. No paraspinal muscle tenderness. no midline spinal

tenderness or step-off.





Genitourinary: Examination deferred





Neurologic: No gross neurologic deficits.





Psychiatric: Normal mood. 











Course





- Re-evaluation


Re-evalutation: 





19 19:46


Patient states that the quality and the severity of her abdominal discomfort has

changed quite a bit.  Initially it started in her low back and her pelvic area. 

I read the results of her CT scan of her abdomen and pelvis with IV contrast as 

well as the pelvic ultrasound.  There was a questionable uterine body mass which

was small which was read as a possible nabothian cysts but needed to be further 

characterized.  Also there was a 0.4cm left distal ureteral kidney stone with no

evidence of hydronephrosis.  Patient states her doctor did a urinalysis which 

was "normal".  Also blood work to check her kidney functions was done and that 

was also "normal".


19 22:31


All in all patient has a pretty normal workup here.  This CT does not show any 

evidence of a kidney stone.  There is no obvious colitis or anything else.  

Sounds like the patient had a colonoscopy up in [place] not to too long ago.  

In any event I think the answer to her abdominal pain is probably going to be 

found in the GI doctor's office.  Her back pain seems strange to be hurting so 

much.  I offered a x-ray series just to evaluate it make sure there was no 

evidence of any type of cancerous process.  They decided that they would get all

that done when they return home to [place].  I advised him to come back to the 

emergency department if her symptoms were getting any worse but otherwise to 

follow-up as quickly as possible with GI specialist in [place].





- Vital Signs


Vital signs: 


                                        











Temp Pulse Resp BP Pulse Ox


 


 98.5 F   72   18   136/77 H  94 


 


 19 18:07  19 18:07  19 18:07  19 18:07  19 18:07














- Laboratory


Result Diagrams: 


                                 19 19:01





                                 19 19:01


Laboratory results interpreted by me: 


                                        











  19





  19:01 19:01 19:01


 


Hgb  15.7 H  


 


Calcium   10.3 H 


 


AST   41 H 


 


Urine Ketones    TRACE H


 


Urine Bilirubin    SMALL H


 


Urine Urobilinogen    4.0 H


 


Ur Leukocyte Esterase    TRACE H














Discharge





- Discharge


Clinical Impression: 


 Bloating





Abdominal pain


Qualifiers:


 Abdominal location: generalized Qualified Code(s): R10.84 - Generalized 

abdominal pain





Low back pain


Qualifiers:


 Chronicity: acute Back pain laterality: unspecified Sciatica presence: without 

sciatica Qualified Code(s): M54.5 - Low back pain





Condition: Good


Instructions:  Abdominal Pain ([facility]), Antinausea Medication ([facility]), Antispasmodics

([facility]), Oral Narcotic Medication ([facility])


Prescriptions: 


Oxycodone HCl/Acetaminophen [Percocet 7.5-325 mg Tablet] 1 each PO Q4HP PRN #15 

tablet


 PRN Reason: 


Metoclopramide HCl [Reglan 10 mg Tablet] 10 mg PO Q6HP PRN #20 tablet


 PRN Reason: 


Dicyclomine HCl [Bentyl 20 mg Tablet] 20 mg PO QID #40 tablet


Referrals: 


gastroenterologist in [place], your [Other] - 19

## 2019-04-03 NOTE — RADIOLOGY REPORT (SQ)
EXAM DESCRIPTION: 



CT ABDOMEN PELVIS WITH IV CONTRAST



COMPLETED DATE/TME:  04/03/2019 19:38



CLINICAL HISTORY: 



59 years, Female, abdominal pain, foul smelling diarrhea



COMPARISON:

None.



TECHNIQUE: Axial images with IV contrast. Sagittal coronal

reconstruction. Delayed images of the kidneys.

All CT scanners at this facility use dose modulation, iterative

reconstruction, and/or weight based dosing when appropriate to

reduce radiation dose to as low as reasonably achievable (ALARA).







FINDINGS:



Mild prominence of left atrium of the heart. Lung bases are

unremarkable. Liver, spleen, pancreas, adrenal glands, kidneys,

aorta and para-aortic regions are unremarkable. No suspicious

bowel or peritoneal abnormalities. The descending colon is

relatively contracted. There is no obvious colitis.



CT of the pelvis demonstrates anteflexed nonenlarged uterus.

There is a cyst projecting just right of the midline adjacent to

the cervix of the uterus. Not in a typical location for an

adnexal cyst. Measures 3 x 2.6 x 2.5 cm. The distal colon is

unremarkable. No adenopathy or free fluid.





IMPRESSION:





1. No obvious acute bowel abnormalities.

2. Cystic structure projecting inferior to the cervix of the

uterus and not typical location for adnexal cyst. Suggest

gynecological follow-up. Not clear if this is clinically or

acutely significant.

 

)

## 2019-04-03 NOTE — ER DOCUMENT REPORT
ED Medical Screen (RME)





- General


Chief Complaint: Abdominal Pain


Stated Complaint: ABDOMINAL PAIN


Time Seen by Provider: 04/03/19 18:15


TRAVEL OUTSIDE OF THE [place] IN LAST 30 DAYS: No





- HPI


Notes: 





04/03/19 18:25


Patient is a 59-year-old female with a history of Sjogren's syndrome and other 

autoimmune process who presents emergency department complaining of upper and 

lower abdominal pain which she has had over the last couple weeks.  Patient 

states that she was evaluated by her family doctor in [place] this past week 

and had a CT performed as well as a pelvic ultrasound.  Patient was told by her 

doctor that she should come here because she is now having more upper abdominal 

pain for evaluation and possible further imaging.  She has associated nausea 

without vomiting.  Denies any headache, fever, neck pain, URI, sore throat, 

chest pain, palpitations, syncope, cough, shortness of breath, wheeze, dyspnea, 

vomiting/diarrhea, urinary retention, dysuria, hematuria, or rash.





Pt pulled up her CT and US report:  4mm ureteral stone and 4mm kidney stone left

side.  Nabothian cervical cyst noted. 








I have treated and performed a rapid initial assessment of this patient.  A 

comprehensive ED assessment and evaluation of the patient, analysis of test 

results and completion of medical decision making process will be conducted by 

additional ED providers.





PHYSICAL EXAMINATION:





GENERAL: Well-appearing, well-nourished and in no acute distress.  A&Ox4.  

Answers questions appropriately.





LUNGS: Breath sounds clear to auscultation bilaterally and equal.  No wheezes 

rales or rhonchi.





HEART: Regular rate and rhythm without murmurs, rubs, gallops.





ABDOMEN: Soft, nondistended abdomen.  No guarding, no rebound.  Normal bowel 

sounds present.  No CVA tenderness bilaterally.  + epigastric tenderness (cannot

elicit thorough abd exam w/o table, however).





Extremities:  No cyanosis, clubbing, or edema b/l.  





NEUROLOGICAL: Normal speech, normal gait. 





PSYCH: Normal mood, normal affect.











- Related Data


Allergies/Adverse Reactions: 


                                        





ampicillin Allergy (Verified 04/03/19 18:04)


   


milnacipran [From Savella] Allergy (Verified 04/03/19 18:04)


   


Quinolones Allergy (Verified 04/03/19 18:04)


   











Physical Exam





- Vital signs


Vitals: 


                                        











Temp Pulse Resp BP Pulse Ox


 


 98.5 F   72   18   136/77 H  94 


 


 04/03/19 18:07  04/03/19 18:07  04/03/19 18:07  04/03/19 18:07  04/03/19 18:07














Course





- Vital Signs


Vital signs: 


                                        











Temp Pulse Resp BP Pulse Ox


 


 98.5 F   72   18   136/77 H  94 


 


 04/03/19 18:07  04/03/19 18:07  04/03/19 18:07  04/03/19 18:07  04/03/19 18:07

## 2019-04-03 NOTE — TELEPHONE ENCOUNTER
"(Link pt) had some test ran on her Thursday of last week here and also at the hospital for a ct scan and a pelvic u/s. Pt is in a lot of pain that \"Feels like her belly has expanded and its going to burst especially toward her right side and lower back.\" Pt is considering the hospital but she doesn't have a fever. Pt is chugging water and is experiencing extreme thirst. For the past two years pt has had deep bone pain and she wanted to fyi that. Pt is wanting you to call and explain her ct scan results.     Response-I called patient again.  She states her condition suddenly changed  this morning pain is mostly mid and upper abdominal,  feels like she is having a lot of distention and bloating.  No fever.  We again reviewed ultrasound and CT findings.  I have recommended that she does seek medical care now while she is out of town.  She has agreed to do so.  "

## 2019-04-04 ENCOUNTER — TELEPHONE (OUTPATIENT)
Dept: OBSTETRICS AND GYNECOLOGY | Age: 59
End: 2019-04-04

## 2019-04-04 ENCOUNTER — DOCUMENTATION (OUTPATIENT)
Dept: OBSTETRICS AND GYNECOLOGY | Age: 59
End: 2019-04-04

## 2019-04-04 NOTE — PROGRESS NOTES
Phone conversation with patient.  She decided even though she was out of town to go to the hospital they repeated lab work which showed a normal white count, mild anemia, CT scan was essentially unremarkable except for the possible small endometrioma or fibroid in the lower uterus.  Patient states ER physician thought this was mostly gastrointestinal and had recommended follow-up with gastroenterologist either there or sooner she comes back.  Patient states she is feeling better today and plans to follow-up as advised.

## 2019-04-08 ENCOUNTER — TELEPHONE (OUTPATIENT)
Dept: OBSTETRICS AND GYNECOLOGY | Age: 59
End: 2019-04-08

## 2019-04-08 NOTE — TELEPHONE ENCOUNTER
Pt is having pain in legs, ankles and feet she is swelling really bad and very short of breath pt went to the hospital when she was out of town  And was told something was going on with her heart she dont really remember what they said and she couldn't get up to get the papers to see what they said exactly

## 2019-04-08 NOTE — TELEPHONE ENCOUNTER
See note      Please notify patient, that if she is having shortness of breath and new onset swelling she should immediately be seen by her family physician or ER.

## 2019-04-23 ENCOUNTER — TELEPHONE (OUTPATIENT)
Dept: OBSTETRICS AND GYNECOLOGY | Age: 59
End: 2019-04-23

## 2019-04-23 DIAGNOSIS — M85.88 OSTEOPENIA OF SPINE: Primary | ICD-10-CM

## 2019-04-23 DIAGNOSIS — E66.01 MORBIDLY OBESE (HCC): ICD-10-CM

## 2019-04-23 NOTE — TELEPHONE ENCOUNTER
Pt is needing an dexa order resubmitted at [facility]. The diagnostic code that we used wasn't covering Medicare so it needs to be submitted as a different reason. Please advise.

## 2019-04-24 ENCOUNTER — TELEPHONE (OUTPATIENT)
Dept: OBSTETRICS AND GYNECOLOGY | Age: 59
End: 2019-04-24

## 2019-04-24 NOTE — TELEPHONE ENCOUNTER
(Link pt)They are calling back again because they need the order resubmitted. The diagnostic code needs to be something that she has previously been diagnosed for in her chart. Pina said you can use Post menopausal or menopause code.

## 2019-04-24 NOTE — TELEPHONE ENCOUNTER
Dr. Caballero,    There is only one order in patient's chart, and has been scheduled.    Thanks,  Lisa

## 2019-04-25 ENCOUNTER — APPOINTMENT (OUTPATIENT)
Dept: MAMMOGRAPHY | Facility: HOSPITAL | Age: 59
End: 2019-04-25

## 2019-04-25 ENCOUNTER — APPOINTMENT (OUTPATIENT)
Dept: BONE DENSITY | Facility: HOSPITAL | Age: 59
End: 2019-04-25

## 2019-04-26 ENCOUNTER — APPOINTMENT (OUTPATIENT)
Dept: CT IMAGING | Facility: HOSPITAL | Age: 59
End: 2019-04-26

## 2019-04-26 ENCOUNTER — HOSPITAL ENCOUNTER (EMERGENCY)
Facility: HOSPITAL | Age: 59
Discharge: HOME | End: 2019-04-26
Attending: EMERGENCY MEDICINE | Admitting: EMERGENCY MEDICINE

## 2019-04-26 VITALS
WEIGHT: 224 LBS | SYSTOLIC BLOOD PRESSURE: 99 MMHG | OXYGEN SATURATION: 98 % | TEMPERATURE: 98.3 F | BODY MASS INDEX: 39.69 KG/M2 | HEART RATE: 80 BPM | HEIGHT: 63 IN | RESPIRATION RATE: 16 BRPM | DIASTOLIC BLOOD PRESSURE: 60 MMHG

## 2019-04-26 DIAGNOSIS — N20.1 CALCULUS OF LEFT URETER: ICD-10-CM

## 2019-04-26 DIAGNOSIS — K62.5 RECTAL BLEEDING: Primary | ICD-10-CM

## 2019-04-26 DIAGNOSIS — R10.9 RIGHT SIDED ABDOMINAL PAIN: ICD-10-CM

## 2019-04-26 LAB
ALBUMIN SERPL-MCNC: 4.4 G/DL (ref 3.5–5.2)
ALBUMIN/GLOB SERPL: 1.9 G/DL
ALP SERPL-CCNC: 95 U/L (ref 39–117)
ALT SERPL W P-5'-P-CCNC: 29 U/L (ref 1–33)
ANION GAP SERPL CALCULATED.3IONS-SCNC: 10 MMOL/L
AST SERPL-CCNC: 28 U/L (ref 1–32)
BASOPHILS # BLD AUTO: 0.05 10*3/MM3 (ref 0–0.2)
BASOPHILS NFR BLD AUTO: 0.9 % (ref 0–1.5)
BILIRUB SERPL-MCNC: 0.5 MG/DL (ref 0.2–1.2)
BILIRUB UR QL STRIP: NEGATIVE
BUN BLD-MCNC: 11 MG/DL (ref 6–20)
BUN/CREAT SERPL: 12.5 (ref 7–25)
CALCIUM SPEC-SCNC: 9.6 MG/DL (ref 8.6–10.5)
CHLORIDE SERPL-SCNC: 103 MMOL/L (ref 98–107)
CLARITY UR: CLEAR
CO2 SERPL-SCNC: 23 MMOL/L (ref 22–29)
COLOR UR: YELLOW
CREAT BLD-MCNC: 0.88 MG/DL (ref 0.57–1)
DEPRECATED RDW RBC AUTO: 44.8 FL (ref 37–54)
EOSINOPHIL # BLD AUTO: 0.1 10*3/MM3 (ref 0–0.4)
EOSINOPHIL NFR BLD AUTO: 1.9 % (ref 0.3–6.2)
ERYTHROCYTE [DISTWIDTH] IN BLOOD BY AUTOMATED COUNT: 13.7 % (ref 12.3–15.4)
GFR SERPL CREATININE-BSD FRML MDRD: 66 ML/MIN/1.73
GFR SERPL CREATININE-BSD FRML MDRD: 80 ML/MIN/1.73
GLOBULIN UR ELPH-MCNC: 2.3 GM/DL
GLUCOSE BLD-MCNC: 115 MG/DL (ref 65–99)
GLUCOSE UR STRIP-MCNC: NEGATIVE MG/DL
HCT VFR BLD AUTO: 45.6 % (ref 34–46.6)
HGB BLD-MCNC: 14.8 G/DL (ref 12–15.9)
HGB UR QL STRIP.AUTO: NEGATIVE
HOLD SPECIMEN: NORMAL
HOLD SPECIMEN: NORMAL
IMM GRANULOCYTES # BLD AUTO: 0.04 10*3/MM3 (ref 0–0.05)
IMM GRANULOCYTES NFR BLD AUTO: 0.8 % (ref 0–0.5)
KETONES UR QL STRIP: NEGATIVE
LEUKOCYTE ESTERASE UR QL STRIP.AUTO: NEGATIVE
LIPASE SERPL-CCNC: 57 U/L (ref 13–60)
LYMPHOCYTES # BLD AUTO: 1.91 10*3/MM3 (ref 0.7–3.1)
LYMPHOCYTES NFR BLD AUTO: 36.1 % (ref 19.6–45.3)
MCH RBC QN AUTO: 28.7 PG (ref 26.6–33)
MCHC RBC AUTO-ENTMCNC: 32.5 G/DL (ref 31.5–35.7)
MCV RBC AUTO: 88.5 FL (ref 79–97)
MONOCYTES # BLD AUTO: 0.33 10*3/MM3 (ref 0.1–0.9)
MONOCYTES NFR BLD AUTO: 6.2 % (ref 5–12)
NEUTROPHILS # BLD AUTO: 2.86 10*3/MM3 (ref 1.7–7)
NEUTROPHILS NFR BLD AUTO: 54.1 % (ref 42.7–76)
NITRITE UR QL STRIP: NEGATIVE
NRBC BLD AUTO-RTO: 0 /100 WBC (ref 0–0.2)
PH UR STRIP.AUTO: 7.5 [PH] (ref 5–8)
PLATELET # BLD AUTO: 251 10*3/MM3 (ref 140–450)
PMV BLD AUTO: 10.4 FL (ref 6–12)
POTASSIUM BLD-SCNC: 3.9 MMOL/L (ref 3.5–5.2)
PROT SERPL-MCNC: 6.7 G/DL (ref 6–8.5)
PROT UR QL STRIP: NEGATIVE
RBC # BLD AUTO: 5.15 10*6/MM3 (ref 3.77–5.28)
SODIUM BLD-SCNC: 136 MMOL/L (ref 136–145)
SP GR UR STRIP: 1.01 (ref 1–1.03)
UROBILINOGEN UR QL STRIP: NORMAL
WBC NRBC COR # BLD: 5.29 10*3/MM3 (ref 3.4–10.8)
WHOLE BLOOD HOLD SPECIMEN: NORMAL
WHOLE BLOOD HOLD SPECIMEN: NORMAL

## 2019-04-26 PROCEDURE — 96375 TX/PRO/DX INJ NEW DRUG ADDON: CPT

## 2019-04-26 PROCEDURE — 25010000002 IOPAMIDOL 61 % SOLUTION: Performed by: EMERGENCY MEDICINE

## 2019-04-26 PROCEDURE — 99284 EMERGENCY DEPT VISIT MOD MDM: CPT

## 2019-04-26 PROCEDURE — 25010000002 ONDANSETRON PER 1 MG: Performed by: PHYSICIAN ASSISTANT

## 2019-04-26 PROCEDURE — 25010000002 HYDROMORPHONE PER 4 MG: Performed by: EMERGENCY MEDICINE

## 2019-04-26 PROCEDURE — 81003 URINALYSIS AUTO W/O SCOPE: CPT | Performed by: PHYSICIAN ASSISTANT

## 2019-04-26 PROCEDURE — 96374 THER/PROPH/DIAG INJ IV PUSH: CPT

## 2019-04-26 PROCEDURE — 80053 COMPREHEN METABOLIC PANEL: CPT | Performed by: PHYSICIAN ASSISTANT

## 2019-04-26 PROCEDURE — 83690 ASSAY OF LIPASE: CPT | Performed by: PHYSICIAN ASSISTANT

## 2019-04-26 PROCEDURE — 74177 CT ABD & PELVIS W/CONTRAST: CPT

## 2019-04-26 PROCEDURE — 85025 COMPLETE CBC W/AUTO DIFF WBC: CPT | Performed by: PHYSICIAN ASSISTANT

## 2019-04-26 RX ORDER — HYDROMORPHONE HYDROCHLORIDE 1 MG/ML
0.5 INJECTION, SOLUTION INTRAMUSCULAR; INTRAVENOUS; SUBCUTANEOUS ONCE
Status: COMPLETED | OUTPATIENT
Start: 2019-04-26 | End: 2019-04-26

## 2019-04-26 RX ORDER — ONDANSETRON 2 MG/ML
4 INJECTION INTRAMUSCULAR; INTRAVENOUS ONCE
Status: COMPLETED | OUTPATIENT
Start: 2019-04-26 | End: 2019-04-26

## 2019-04-26 RX ORDER — ONDANSETRON 4 MG/1
4-8 TABLET, ORALLY DISINTEGRATING ORAL EVERY 8 HOURS PRN
Qty: 15 TABLET | Refills: 0 | Status: SHIPPED | OUTPATIENT
Start: 2019-04-26 | End: 2021-05-03

## 2019-04-26 RX ORDER — ONDANSETRON 4 MG/1
4-8 TABLET, ORALLY DISINTEGRATING ORAL EVERY 8 HOURS PRN
Qty: 15 TABLET | Refills: 0 | Status: SHIPPED | OUTPATIENT
Start: 2019-04-26 | End: 2019-04-26 | Stop reason: SDUPTHER

## 2019-04-26 RX ADMIN — HYDROMORPHONE HYDROCHLORIDE 0.5 MG: 1 INJECTION, SOLUTION INTRAMUSCULAR; INTRAVENOUS; SUBCUTANEOUS at 14:14

## 2019-04-26 RX ADMIN — IOPAMIDOL 85 ML: 612 INJECTION, SOLUTION INTRAVENOUS at 14:46

## 2019-04-26 RX ADMIN — SODIUM CHLORIDE, POTASSIUM CHLORIDE, SODIUM LACTATE AND CALCIUM CHLORIDE 1000 ML: 600; 310; 30; 20 INJECTION, SOLUTION INTRAVENOUS at 14:16

## 2019-04-26 RX ADMIN — ONDANSETRON 4 MG: 2 INJECTION INTRAMUSCULAR; INTRAVENOUS at 14:14

## 2019-04-30 ENCOUNTER — TELEPHONE (OUTPATIENT)
Dept: OBSTETRICS AND GYNECOLOGY | Age: 59
End: 2019-04-30

## 2019-05-02 ENCOUNTER — OFFICE VISIT (OUTPATIENT)
Dept: PAIN MEDICINE | Facility: CLINIC | Age: 59
End: 2019-05-02

## 2019-05-02 ENCOUNTER — RESULTS ENCOUNTER (OUTPATIENT)
Dept: PAIN MEDICINE | Facility: CLINIC | Age: 59
End: 2019-05-02

## 2019-05-02 VITALS
TEMPERATURE: 98 F | BODY MASS INDEX: 39.45 KG/M2 | HEIGHT: 63 IN | WEIGHT: 222.66 LBS | HEART RATE: 74 BPM | DIASTOLIC BLOOD PRESSURE: 85 MMHG | RESPIRATION RATE: 18 BRPM | SYSTOLIC BLOOD PRESSURE: 133 MMHG | OXYGEN SATURATION: 95 %

## 2019-05-02 DIAGNOSIS — G89.29 CHRONIC BILATERAL LOW BACK PAIN WITHOUT SCIATICA: ICD-10-CM

## 2019-05-02 DIAGNOSIS — N20.0 RENAL STONE: ICD-10-CM

## 2019-05-02 DIAGNOSIS — M54.50 CHRONIC BILATERAL LOW BACK PAIN WITHOUT SCIATICA: ICD-10-CM

## 2019-05-02 DIAGNOSIS — G62.9 SMALL FIBER NEUROPATHY: ICD-10-CM

## 2019-05-02 DIAGNOSIS — M79.641 BILATERAL HAND PAIN: ICD-10-CM

## 2019-05-02 DIAGNOSIS — R07.2 PRECORDIAL PAIN: Primary | ICD-10-CM

## 2019-05-02 DIAGNOSIS — M79.642 BILATERAL HAND PAIN: ICD-10-CM

## 2019-05-02 DIAGNOSIS — R07.2 PRECORDIAL PAIN: ICD-10-CM

## 2019-05-02 PROCEDURE — 99214 OFFICE O/P EST MOD 30 MIN: CPT | Performed by: PAIN MEDICINE

## 2019-05-02 RX ORDER — DICYCLOMINE HYDROCHLORIDE 10 MG/1
CAPSULE ORAL
Refills: 1 | COMMUNITY
Start: 2019-04-22 | End: 2021-05-03

## 2019-05-02 RX ORDER — HYDROCODONE BITARTRATE AND ACETAMINOPHEN 7.5; 325 MG/1; MG/1
1 TABLET ORAL DAILY PRN
Qty: 30 TABLET | Refills: 0 | Status: SHIPPED | OUTPATIENT
Start: 2019-05-02

## 2019-05-02 NOTE — PROGRESS NOTES
CHIEF COMPLAINT: Back Pain    HPI  Gabi Tomas is a 59 y.o. female.  She is here to follow up for Back Pain    Gabi Tomas is a 59 y.o. female  who presents to the office for follow-up.  Since last visit her bilateral hand pain has improved but having worsening pain elsewehre in body.   Worse pain is in RUQ. Underwent ultrasound/CT scan and found uterine well circumscribed uterine lesion. Following with Dr. Caballero OB/GYN. Pain has been present for over 2 years and worsening over last couple months.   The patient states their pain is a 7 on a scale of 1-10.  The patient describes this pain as constant soreness and tender.  The pain is located in RUQ of abdomen and does not radiate. This painful problem is aggravated by nothing and is slightly improved with BM. Underwent coloscopy which was negative.   Also has right sided low back pain. Constant low dull ache pain and now radiates over to left side. No radiation down BLE.   Saw stones on scans, currently passing, sees urology today.     Pain medication was last prescribed by pcp, norco 7.5 #100 last 7/2018- takes sparingly, needs refill. pcp wants to manage.     Past pain medications:  Topamax - stopped due to not having migraines any more.   lyrica- makes heart flutter  Savella - rectal bleeding  Mobic 15 mg daily  Lidocaine patches - no help  Excedrin migraines - some help  Compound cream - didn't help     Current pain medications:   Gabapentin 600 mg qid - helps  Cymbalta 60 mg bid  zipsor 25 mg prn - very rarely   Lidocaine patches - helping     Past therapies:  Physical Therapy: yes  Chiropractor: no  Massage Therapy: yes  TENS: no  Neck or back surgery: yes     Previous Injection: Bilateral L3-5 Lumbar Medial Branch RADIOFREQUENCY on 11/27/17  Effect of Injection (%): 100%  Length of Relief: 6 months     PEG Assessment   What number best describes your pain on average in the past week? 7  What number best describes how, during the past week, pain has  interfered with your enjoyment of life? 9  What number best describes how, during the past week, pain has interfered with your general activity? 9      Current Outpatient Medications:   •  ALPRAZolam (XANAX) 0.5 MG tablet, TK 1 T PO  BID PRN, Disp: , Rfl: 5  •  AMITIZA 8 MCG capsule, TK 1 C PO BID WF, Disp: , Rfl: 2  •  atorvastatin (LIPITOR) 40 MG tablet, TK 1 T PO HS, Disp: , Rfl: 1  •  DULoxetine (CYMBALTA) 60 MG capsule, TAKE ONE CAPSULE BY MOUTH TWICE DAILY, Disp: 180 capsule, Rfl: 0  •  fexofenadine (ALLEGRA) 180 MG tablet, Take 1 tablet by mouth Daily., Disp: 30 tablet, Rfl: 12  •  fluticasone (FLONASE) 50 MCG/ACT nasal spray, 2 sprays into each nostril Daily., Disp: , Rfl:   •  gabapentin (NEURONTIN) 600 MG tablet, 2 tid, Disp: 180 tablet, Rfl: 1  •  ibuprofen (ADVIL,MOTRIN) 800 MG tablet, Take 800 mg by mouth Every 8 (Eight) Hours As Needed for Mild Pain ., Disp: , Rfl:   •  montelukast (SINGULAIR) 10 MG tablet, Take 10 mg by mouth., Disp: , Rfl:   •  ondansetron ODT (ZOFRAN-ODT) 4 MG disintegrating tablet, Take 1-2 tablets by mouth Every 8 (Eight) Hours As Needed for Nausea or Vomiting., Disp: 15 tablet, Rfl: 0  •  pilocarpine (SALAGEN) 5 MG tablet, Take 5 mg by mouth., Disp: , Rfl:   •  solifenacin (VESICARE) 5 MG tablet, Take 5 mg by mouth., Disp: , Rfl:   •  vitamin D (ERGOCALCIFEROL) 72719 units capsule capsule, TK 1 C PO WEEKLY, Disp: , Rfl: 1  •  dicyclomine (BENTYL) 10 MG capsule, TAKE 1 CAPSULE TWICE DAILY PRN ABDOMINAL PAIN, CRAMPING, Disp: , Rfl: 1  •  HYDROcodone-acetaminophen (NORCO) 7.5-325 MG per tablet, Take 1 tablet by mouth Daily As Needed for Moderate Pain ., Disp: 30 tablet, Rfl: 0    IMAGING  Neuro qsart 10/2014:  QSART responses at the proximal leg and distal leg are reduced  but the responses elsewhere are normal.  These findings are  nonspecific for etiology but are consistent with a postganglionic  sympathetic sudomotor abnormality like that seen in  autonomic/small fiber  neuropathy. However, sparing of the foot is  not typical of length-dependent axonopathy.      Neuro cardio autonomic reflex w/wo tilt 10/2014:  Heart rate response to deep breathing is borderline to mildly  reduced via the mean heart rate range and the E:I ratio.  Heart  rate response to the Valsalva maneuver, as assessed by the  Valsalva ratio, is mildly reduced but the blood pressure  responses to phase II and phase IV of the maneuver are normal.   During 10 minutes of 60 degrees head-up tilt, heart rate and  blood pressure responses were normal.      This cardiovascular autonomic test panel is notable for a  reduction of the heart rate response to deep breathing and the  Valsalva ratio. These findings are non-specific for etiology but  suggest cardiovagal impairment.     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     No imaging of low back    Imaging last reviewed: 05/02/19     PFSH:  The following portions of the patient's history were reviewed and updated as appropriate: problem list, past medical history, past surgery history, social history, family history, medications, and allergies    Review of Systems   Constitutional: Positive for diaphoresis and fatigue. Negative for fever.   HENT: Positive for congestion.    Eyes: Negative for visual disturbance.   Respiratory: Positive for cough and shortness of breath. Negative for wheezing.    Cardiovascular: Positive for chest pain and leg swelling. Negative for palpitations.   Gastrointestinal: Positive for abdominal pain and nausea. Negative for vomiting.   Genitourinary: Positive for frequency. Negative for difficulty urinating.   Musculoskeletal: Positive for back pain, joint swelling (bilateral ankles and hands) and neck pain.   Neurological: Positive for dizziness, weakness,  "light-headedness and headaches. Negative for syncope.   Psychiatric/Behavioral: Positive for confusion and sleep disturbance. Negative for suicidal ideas. The patient is nervous/anxious.    All other systems reviewed and are negative.      Vitals:    05/02/19 1105   BP: 133/85   Pulse: 74   Resp: 18   Temp: 98 °F (36.7 °C)   SpO2: 95%   Weight: 101 kg (222 lb 10.6 oz)   Height: 160 cm (63\")   PainSc:   7   PainLoc: Back       Physical Exam   Constitutional: She is oriented to person, place, and time. She appears well-developed and well-nourished. No distress.   HENT:   Head: Normocephalic and atraumatic.   Nose: Nose normal.   Mouth/Throat: Oropharynx is clear and moist.   Eyes: Conjunctivae and EOM are normal.   Neck: Normal range of motion. Neck supple.   Pulmonary/Chest: Effort normal. No stridor.   Abdominal: Soft. Bowel sounds are normal. There is tenderness in the right upper quadrant.   Musculoskeletal:        Lumbar back: She exhibits decreased range of motion, tenderness and pain.        Right hand: She exhibits decreased range of motion, tenderness and deformity. She exhibits no swelling. Decreased sensation noted. Decreased strength noted.        Left hand: She exhibits decreased range of motion, tenderness and deformity. She exhibits no swelling. Decreased sensation noted. Decreased strength noted.   +bilateral lumbar facet tenderness  +bilateral lumbar facet loading   +worse pain in low back with lumbar flexion and twisting   Neurological: She is alert and oriented to person, place, and time. She has normal strength. No cranial nerve deficit or sensory deficit.   Skin: Skin is warm and dry. No laceration noted. She is not diaphoretic.   Psychiatric: She has a normal mood and affect. Her speech is normal and behavior is normal.   Nursing note and vitals reviewed.    Ortho Exam  Neurologic Exam     Mental Status   Oriented to person, place, and time.   Speech: speech is normal     Cranial Nerves     CN " III, IV, VI   Extraocular motions are normal.     Motor Exam     Strength   Strength 5/5 throughout.       Lab Results   Component Value Date    POCMETH Negative 12/08/2016    POCAMPHET Negative 12/08/2016    POCBARBITUR Negative 12/08/2016    POCBENZO Negative 12/08/2016    POCCOCAINE Negative 12/08/2016    POCMETHADO Negative 12/08/2016    POCOPIATES Negative 12/08/2016    POCOXYCODO Negative 12/08/2016    POCPHENCYC Negative 12/08/2016    POCPROPOXY Negative 12/08/2016    POCTHC Negative 12/08/2016    POCTRICYC Negative 12/08/2016     Last UDS results reviewed: 05/02/19   Last UDS: 12/2016  Comments: Consistent     Date of last JAVI reviewed : 05/02/19   Comments: Bishop Ashley was seen today for back pain.    Diagnoses and all orders for this visit:    Precordial pain    Bilateral hand pain  -     HYDROcodone-acetaminophen (NORCO) 7.5-325 MG per tablet; Take 1 tablet by mouth Daily As Needed for Moderate Pain .    Chronic bilateral low back pain without sciatica  -     HYDROcodone-acetaminophen (NORCO) 7.5-325 MG per tablet; Take 1 tablet by mouth Daily As Needed for Moderate Pain .    Small fiber neuropathy    Renal stone  -     HYDROcodone-acetaminophen (NORCO) 7.5-325 MG per tablet; Take 1 tablet by mouth Daily As Needed for Moderate Pain .      Requested Prescriptions     Signed Prescriptions Disp Refills   • HYDROcodone-acetaminophen (NORCO) 7.5-325 MG per tablet 30 tablet 0     Sig: Take 1 tablet by mouth Daily As Needed for Moderate Pain .     - will take over norco Rx- takes very sparingly. #100 lasted 10 months but taking more regularly now with increased abdominal/back pain with uterine mass/kidney stones. Plan to write for minimal amount and see how much she uses over next month.   - continue to use sparingly and as needed.   - Discussed with the patient regarding long-term side effects of opioids including but not limited to dependence, addiction, sedation, respiratory depression,  opioid induced hormonal suppression, hyperalgesia, and elevated risk of myocardial infarction.   - may need more medication right now for acute issues but plan on decreasing once resolved.   - Random urine drug screen per office policy today, to be checked at next visit. Last UDS performed was reviewed and consistent.   -  - good relief with bilateral L2-L5 RFA in past. Will repeat after acute issue resolve.  - discussed hand pain- Left stellate ganglion/sympathetic block.  will perform in future as well.   - This was a 25 minute face to face visit with 15 minutes spent counseling on medication, usage and treatment plan.      Wt Readings from Last 3 Encounters:   05/02/19 101 kg (222 lb 10.6 oz)   04/26/19 102 kg (224 lb)   03/28/19 102 kg (225 lb)     Body mass index is 39.44 kg/m². Patient counseled on the importance of weight loss to help with overall health and pain control. Patient instructed to attempt weight loss.   Plan: Calorie counting  reduce portion size and reduce fast food intake    Follow-up in 1 month.    Emilee Hickman MD  Pain Management

## 2019-05-10 ENCOUNTER — APPOINTMENT (OUTPATIENT)
Dept: MAMMOGRAPHY | Facility: HOSPITAL | Age: 59
End: 2019-05-10

## 2019-05-10 ENCOUNTER — APPOINTMENT (OUTPATIENT)
Dept: BONE DENSITY | Facility: HOSPITAL | Age: 59
End: 2019-05-10

## 2019-05-15 ENCOUNTER — PROCEDURE VISIT (OUTPATIENT)
Dept: OBSTETRICS AND GYNECOLOGY | Age: 59
End: 2019-05-15

## 2019-05-15 ENCOUNTER — OFFICE VISIT (OUTPATIENT)
Dept: OBSTETRICS AND GYNECOLOGY | Age: 59
End: 2019-05-15

## 2019-05-15 VITALS
DIASTOLIC BLOOD PRESSURE: 80 MMHG | HEIGHT: 63 IN | SYSTOLIC BLOOD PRESSURE: 130 MMHG | BODY MASS INDEX: 38.98 KG/M2 | WEIGHT: 220 LBS

## 2019-05-15 DIAGNOSIS — R10.2 PELVIC PAIN IN FEMALE: Primary | ICD-10-CM

## 2019-05-15 DIAGNOSIS — R10.2 PELVIC PAIN: Primary | ICD-10-CM

## 2019-05-15 DIAGNOSIS — N83.8 OVARIAN MASS, LEFT: ICD-10-CM

## 2019-05-15 PROCEDURE — 99214 OFFICE O/P EST MOD 30 MIN: CPT | Performed by: OBSTETRICS & GYNECOLOGY

## 2019-05-15 PROCEDURE — 76830 TRANSVAGINAL US NON-OB: CPT | Performed by: OBSTETRICS & GYNECOLOGY

## 2019-05-15 RX ORDER — TAMSULOSIN HYDROCHLORIDE 0.4 MG/1
CAPSULE ORAL
Refills: 0 | COMMUNITY
Start: 2019-05-10 | End: 2021-05-03

## 2019-05-15 RX ORDER — LACTULOSE 10 G/15ML
SOLUTION ORAL
Refills: 0 | COMMUNITY
Start: 2019-05-10 | End: 2021-05-03

## 2019-05-15 RX ORDER — SUCRALFATE 1 G/1
1 TABLET ORAL 2 TIMES DAILY
Refills: 0 | COMMUNITY
Start: 2019-05-10 | End: 2021-05-03

## 2019-05-16 ENCOUNTER — TELEPHONE (OUTPATIENT)
Dept: OBSTETRICS AND GYNECOLOGY | Age: 59
End: 2019-05-16

## 2019-05-16 LAB — CANCER AG125 SERPL-ACNC: 13.5 U/ML (ref 0–38.1)

## 2019-05-16 NOTE — TELEPHONE ENCOUNTER
----- Message from Jonas Caballero MD sent at 5/16/2019 10:26 AM EDT -----  Please notify pt. That labs are with in normal limits

## 2020-02-10 RX ORDER — OMEPRAZOLE 40 MG/1
40 CAPSULE, DELAYED RELEASE ORAL DAILY
Qty: 90 CAPSULE | Refills: 1 | Status: SHIPPED | OUTPATIENT
Start: 2020-02-10 | End: 2021-07-12

## 2020-02-10 NOTE — TELEPHONE ENCOUNTER
Received fax stating insurance covers a 90 day supply. OK to send in? Records are in [facility].

## 2021-03-26 ENCOUNTER — BULK ORDERING (OUTPATIENT)
Dept: CASE MANAGEMENT | Facility: OTHER | Age: 61
End: 2021-03-26

## 2021-03-26 DIAGNOSIS — Z23 IMMUNIZATION DUE: ICD-10-CM

## 2021-07-12 ENCOUNTER — OFFICE VISIT (OUTPATIENT)
Dept: NEUROLOGY | Facility: CLINIC | Age: 61
End: 2021-07-12

## 2021-07-12 ENCOUNTER — LAB (OUTPATIENT)
Dept: LAB | Facility: HOSPITAL | Age: 61
End: 2021-07-12

## 2021-07-12 VITALS
HEART RATE: 87 BPM | HEIGHT: 63 IN | WEIGHT: 215 LBS | DIASTOLIC BLOOD PRESSURE: 74 MMHG | OXYGEN SATURATION: 98 % | BODY MASS INDEX: 38.09 KG/M2 | SYSTOLIC BLOOD PRESSURE: 124 MMHG

## 2021-07-12 DIAGNOSIS — R20.2 PARESTHESIAS: Primary | ICD-10-CM

## 2021-07-12 DIAGNOSIS — G90.9 AUTONOMIC DYSFUNCTION: ICD-10-CM

## 2021-07-12 DIAGNOSIS — G25.0 BENIGN ESSENTIAL TREMOR: ICD-10-CM

## 2021-07-12 LAB — VIT B12 BLD-MCNC: 424 PG/ML (ref 211–946)

## 2021-07-12 PROCEDURE — 82607 VITAMIN B-12: CPT | Performed by: PSYCHIATRY & NEUROLOGY

## 2021-07-12 PROCEDURE — 36415 COLL VENOUS BLD VENIPUNCTURE: CPT | Performed by: PSYCHIATRY & NEUROLOGY

## 2021-07-12 PROCEDURE — 99204 OFFICE O/P NEW MOD 45 MIN: CPT | Performed by: PSYCHIATRY & NEUROLOGY

## 2021-07-12 NOTE — PROGRESS NOTES
Chief complaint: History of neuropathy, Parkinson's disease    Patient ID: Gabi Tomas is a 61 y.o. female.    HPI: I have the pleasure of seeing your patient today.  As you may know she is a 61-year-old female with a history of tremor, peripheral neuropathy and autonomic dysfunction.  The patient says that the majority of her symptoms began several years ago back in 2013.  At that time she says that she began experiencing pins and needle, numbness and tingling-like sensations in her hands and feet.  The symptoms have progressed and continued.  She also began experiencing tremors in her hands.  She says that the tremor is most significant in the left upper extremity however she does experience tremoring of both hands.  She also has had some balance and gait issues.  She says that when walking upstairs she does not feel very confident.  She has fallen, the last one was about 3 weeks ago.  She has a lot of trouble with lightheadedness.  She was evaluated at multiple facilities here in [place] however also she is going to the [facility] for an assessment.  She did have autonomic testing there.  She was found to have some type of autonomic dysfunction.  She did have a positive tilt table test.  They were unable to do the sweat test.  She had low parameters on the Valsalva maneuvers as well.  She denies a history of diabetes.  In looking back in her chart she has had a low vitamin B12 for quite some time.  Last level was checked in 2019 and was 272.  No family history of similar symptoms.  She denies any episodes of freezing or inability to move.  No significant slowing of movement.  Her tremor is noted predominantly with moving her hands and arms around.  She denies any double vision.  No focal weakness or numbness of her arms or legs.  She has been taking Sinemet.  She says that that may have helped her tremor some.  Previous EMG/nerve conduction studies to evaluate peripheral neuropathy and the issue of  the paresthesias have been within normal limits.  We are in the process of retrieving all records including previous MRI imaging of the brain.    The following portions of the patient's history were reviewed and updated as appropriate: allergies, current medications, past family history, past medical history, past social history, past surgical history and problem list.    Review of Systems   Constitutional: Positive for fatigue. Negative for activity change and appetite change.   HENT: Positive for trouble swallowing and voice change. Negative for tinnitus.    Eyes: Negative for photophobia, pain and visual disturbance.   Respiratory: Negative for chest tightness, shortness of breath and wheezing.    Gastrointestinal: Negative for abdominal pain, nausea and vomiting.   Endocrine: Negative for cold intolerance, heat intolerance and polydipsia.   Musculoskeletal: Positive for gait problem (balance issues. 3 weeks since last fall. ). Negative for back pain and neck pain.   Allergic/Immunologic: Negative for environmental allergies, food allergies and immunocompromised state.   Neurological: Positive for dizziness, tremors (left arm and hand. ), speech difficulty (diffuclty finding words. ), weakness (in general ), light-headedness, numbness and headaches (daily migraines ). Negative for seizures, syncope and facial asymmetry.   Hematological: Negative for adenopathy. Does not bruise/bleed easily.   Psychiatric/Behavioral: Positive for behavioral problems, confusion, decreased concentration, dysphoric mood and sleep disturbance. Negative for agitation, hallucinations, self-injury and suicidal ideas. The patient is not nervous/anxious and is not hyperactive.       I have reviewed the review of systems above performed by my medical assistant.      Vitals:    07/12/21 1307   BP: 124/74   Pulse: 87   SpO2: 98%       Neurologic Exam     Mental Status   Oriented to person, place, and time.   Registration: recalls 3 of 3  objects. Follows 3 step commands.   Attention: normal. Concentration: normal.   Speech: speech is normal   Level of consciousness: alert  Knowledge: consistent with education (No deficits found.).   Normal comprehension.     Cranial Nerves     CN II   Visual fields full to confrontation.     CN III, IV, VI   Pupils are equal, round, and reactive to light.  Extraocular motions are normal.   CN III: no CN III palsy  CN VI: no CN VI palsy  Nystagmus: none   Diplopia: none    CN V   Facial sensation intact.     CN VII   Facial expression full, symmetric.     CN VIII   CN VIII normal.     CN IX, X   CN IX normal.   CN X normal.     CN XI   CN XI normal.     CN XII   CN XII normal.     Motor Exam   Muscle bulk: normal  Right arm tone: normal  Left arm tone: normal  Right leg tone: normal  Left leg tone: normal    Strength   Right neck flexion: 5/5  Left neck flexion: 5/5  Right neck extension: 5/5  Left neck extension: 5/5  Right deltoid: 5/5  Left deltoid: 5/5  Right biceps: 5/5  Left biceps: 5/5  Right triceps: 5/5  Left triceps: 5/5  Right wrist flexion: 5/5  Left wrist flexion: 5/5  Right wrist extension: 5/5  Left wrist extension: 5/5  Right interossei: 5/5  Left interossei: 5/5  Right abdominals: 5/5  Left abdominals: 5/5  Right iliopsoas: 5/5  Left iliopsoas: 5/5  Right quadriceps: 5/5  Left quadriceps: 5/5  Right hamstrin/5  Left hamstrin/5  Right glutei: 5/5  Left glutei: 5/5  Right anterior tibial: 5/5  Left anterior tibial: 5/5  Right posterior tibial: 5/5  Left posterior tibial: 5/5  Right peroneal: 5/5  Left peroneal: 5/5  Right gastroc: 5/5  Left gastroc: 5/5    Sensory Exam   Light touch normal.   Vibration normal.   Proprioception normal.   Right leg pinprick: decreased from toes  Left leg pinprick: decreased from toes    Gait, Coordination, and Reflexes     Gait  Gait: normal    Coordination   Romberg: negative    Tremor   Resting tremor: absent  Intention tremor: present    Reflexes   Right  brachioradialis: 2+  Left brachioradialis: 2+  Right biceps: 2+  Left biceps: 2+  Right triceps: 2+  Left triceps: 2+  Right patellar: 2+  Left patellar: 2+  Right achilles: 2+  Left achilles: 2+  Right : 2+  Left : 2+Station is normal.       Physical Exam  Vitals reviewed.   Constitutional:       General: She is not in acute distress.     Appearance: She is well-developed.   HENT:      Head: Normocephalic and atraumatic.   Eyes:      Extraocular Movements: EOM normal.      Pupils: Pupils are equal, round, and reactive to light.   Cardiovascular:      Rate and Rhythm: Normal rate and regular rhythm.      Heart sounds: Normal heart sounds.   Pulmonary:      Effort: Pulmonary effort is normal. No respiratory distress.      Breath sounds: Normal breath sounds.   Abdominal:      General: Bowel sounds are normal. There is no distension.      Palpations: Abdomen is soft.      Tenderness: There is no abdominal tenderness.   Musculoskeletal:         General: No deformity.      Cervical back: Normal range of motion.   Skin:     General: Skin is warm.      Findings: No rash.   Neurological:      Mental Status: She is oriented to person, place, and time.      Coordination: Romberg Test normal.      Gait: Gait is intact.      Deep Tendon Reflexes:      Reflex Scores:       Tricep reflexes are 2+ on the right side and 2+ on the left side.       Bicep reflexes are 2+ on the right side and 2+ on the left side.       Brachioradialis reflexes are 2+ on the right side and 2+ on the left side.       Patellar reflexes are 2+ on the right side and 2+ on the left side.       Achilles reflexes are 2+ on the right side and 2+ on the left side.  Psychiatric:         Speech: Speech normal.         Judgment: Judgment normal.         Procedures    Assessment/Plan: There were no significant objective findings to suggest Parkinson's disease today.  Her tremor is likely benign essential tremor in etiology.  I do not feel that she has a  true resting tremor.  I did not observe any cogwheeling rigidity or significant bradykinesia.  Peripheral nerve testing seemed within normal limits today.  However given her history of B12 deficiency that could explain the paresthesias of the hands and feet, memory issues and fatigue.  We did talk about B12 deficiency in great detail.  We will check a vitamin B12 level today.  I would like to see her back in 3 months.  I would like for her not to take the carbidopa/levodopa for 24 hours prior to her next visit with us.  I would like to retrieve a copy of her EMGs as well as the MRI of her brain that she says that she had several years ago.       Diagnoses and all orders for this visit:    1. Paresthesias (Primary)  -     Vitamin B12    2. Benign essential tremor    3. Autonomic dysfunction           Jona Garcia II, MD

## 2021-07-16 ENCOUNTER — TELEPHONE (OUTPATIENT)
Dept: NEUROLOGY | Facility: CLINIC | Age: 61
End: 2021-07-16

## 2021-07-16 DIAGNOSIS — E53.8 B12 DEFICIENCY: Primary | ICD-10-CM

## 2021-07-16 NOTE — TELEPHONE ENCOUNTER
Caller: Gabi Tomas    Relationship: Self    Best call back number: [phone]    What was the call regarding: PT CALLED IN ASKING IF DR CASTANEDA COULD REVIEW HER B12 LEVELS, AND PUT HER ON B12 INJECTIONS IF HE THINKS SHE NEEDS THEM- SHE HAD LAB WORK DONE ON 7/12- PER PT- DR CASTANEDA ADV HER HE WOULD CONTACT HER IF HE THOUGHT SHE NEEDED THEM BUT SHE HASNT HEARD BACK YET. SHE GOT HER RESULTS BACK AND B12 LEVEL SHOWS 424- ACCORDING TO PATIENT.     PLEASE REVIEW AND ADVISE, THANK YOU.     Do you require a callback: YES          
Please advise. Pt wants to do injections at home. Please advise and approve thank you.   
Left arm;

## 2021-07-19 RX ORDER — CYANOCOBALAMIN 1000 UG/ML
INJECTION, SOLUTION INTRAMUSCULAR; SUBCUTANEOUS
Qty: 12 ML | Refills: 0 | Status: SHIPPED | OUTPATIENT
Start: 2021-07-19 | End: 2022-02-14

## 2021-07-20 ENCOUNTER — TELEPHONE (OUTPATIENT)
Dept: NEUROLOGY | Facility: CLINIC | Age: 61
End: 2021-07-20

## 2021-07-20 NOTE — TELEPHONE ENCOUNTER
----- Message from Gabi Tomas sent at 7/20/2021  3:22 PM EDT -----  Regarding: Prescription Question  Contact: [phone]  Dejon Garcia. My hands and forearm bones are so painful, it's difficult to use them. When I've increased the Levodopa, it helped tremendously with the pain. Is it ok to increase my dose by 1/2 a pill?  I have Hydrocodone but I don't like to take it very often.  Thanks for your time.     Gabi Tomas   [phone]

## 2021-10-13 ENCOUNTER — TELEPHONE (OUTPATIENT)
Dept: NEUROLOGY | Facility: CLINIC | Age: 61
End: 2021-10-13

## 2021-10-13 NOTE — TELEPHONE ENCOUNTER
Caller: Gabi Tomas    Relationship to patient: Self    Best call back number:[phone]    Chief complaint: NA    Type of visit: F/U    Requested date: FIRST AVAILABLE     If rescheduling, when is the original appointment: NA     Additional notes:PATIENT STATED COULD NOT COME IN TODAY. ASKING TO BE RESCHEDULED. I DID NOT FIND ANY OPENINGS AT ALL INTO FALL OF NEXT YEAR. PLEASE ADVISE.

## 2021-10-20 NOTE — PROGRESS NOTES
Subjective     Chief Complaint   Patient presents with   • Gynecologic Exam     Gyn u/s:Follow up from  CT       History of Present Illness  Gabi Tomas is a 59 y.o. female is being seen today for follow-up evaluation of pelvic lesion of undetermined etiology.  Patient has had ongoing and intermittent abdominal and pelvic discomfort.  A lot of her discomfort is in the right lateral and upper quadrant well above the umbilical line.  However more recently she started to have some pelvic discomfort and that seems to be exacerbated with pelvic exam and ultrasound vaginal scan.  She has had several CT scans which have identified a pelvic mass of unknown etiology.  It is not clear whether it is ovarian uterine or arising from the cervix.  It is approximately 3 x 2.8 cm.  Its associated with menstrual like cramping.  Patient also has had a history of RAYSHAWN-3 in her mid 20s but states all Pap smears have been normal since that time.  A recent Pap smear a month and a half ago was also normal.    Today's ultrasound showed a typical small post menopausal uterus, right adnexa could not be visualized and this was the same result previous hospital ultrasound revealed.  Left adnexa was not clearly identified, however there is a 2-1/2 x 3 cm mass in the area of the left adnexa somewhat posterior.  No free fluid is seen.  Also of note, is the fact that previous CT scan did not identify any type of ascites lymphadenopathy.      She is also known to have renal stones  Chief Complaint   Patient presents with   • Gynecologic Exam     Gyn u/s:Follow up from  CT   .        The following portions of the patient's history were reviewed and updated as appropriate: allergies, current medications, past family history, past medical history, past social history, past surgical history and problem list.    PAST MEDICAL HISTORY  Past Medical History:   Diagnosis Date   • Allergic    • Anemia    • Anxiety    • Arthritis    • Cancer (CMS/HCC)    •  Cervical dysplasia     Conization in the past   • Cervical mass    • Depression    • Eating disorder    • Fibromyalgia, primary    • GERD (gastroesophageal reflux disease)    • Headache    • Hyperlipidemia    • Hypothyroidism    • Kidney stone    • Low back pain    • Obesity    • Orthostatic hypertension    • Peptic ulceration    • Peripheral neuropathy    • Tremor    • Urinary tract infection    • Visual impairment      OB History   No data available     Past Surgical History:   Procedure Laterality Date   • CERVICAL CONIZATION     •  SECTION PRIMARY     • DILATATION AND CURETTAGE       Family History   Problem Relation Age of Onset   • Lymphoma Mother    • Depression Mother    • Cancer Father    • Heart attack Father    • Psoriasis Father    • Alcohol abuse Father    • Aneurysm Brother    • COPD Brother    • Heart disease Brother    • Heart attack Brother    • Cancer Maternal Aunt    • Cancer Maternal Uncle    • Cancer Paternal Aunt    • Heart disease Paternal Aunt    • Cancer Paternal Uncle    • Heart disease Paternal Uncle    • Aneurysm Brother    • Lung disease Brother    • Alcohol abuse Brother    • ADD / ADHD Son      Social History     Tobacco Use   Smoking Status Former Smoker   • Packs/day: 0.50   • Years: 42.00   • Pack years: 21.00   • Types: Cigarettes   • Last attempt to quit: 10/1/2016   • Years since quittin.6   Smokeless Tobacco Never Used       Current Outpatient Medications:   •  ALPRAZolam (XANAX) 0.5 MG tablet, TK 1 T PO  BID PRN, Disp: , Rfl: 5  •  AMITIZA 8 MCG capsule, TK 1 C PO BID WF, Disp: , Rfl: 2  •  atorvastatin (LIPITOR) 40 MG tablet, TK 1 T PO HS, Disp: , Rfl: 1  •  dicyclomine (BENTYL) 10 MG capsule, TAKE 1 CAPSULE TWICE DAILY PRN ABDOMINAL PAIN, CRAMPING, Disp: , Rfl: 1  •  DULoxetine (CYMBALTA) 60 MG capsule, TAKE ONE CAPSULE BY MOUTH TWICE DAILY, Disp: 180 capsule, Rfl: 0  •  fexofenadine (ALLEGRA) 180 MG tablet, Take 1 tablet by mouth Daily., Disp: 30 tablet, Rfl:  12  •  fluticasone (FLONASE) 50 MCG/ACT nasal spray, 2 sprays into each nostril Daily., Disp: , Rfl:   •  gabapentin (NEURONTIN) 600 MG tablet, 2 tid, Disp: 180 tablet, Rfl: 1  •  HYDROcodone-acetaminophen (NORCO) 7.5-325 MG per tablet, Take 1 tablet by mouth Daily As Needed for Moderate Pain ., Disp: 30 tablet, Rfl: 0  •  ibuprofen (ADVIL,MOTRIN) 800 MG tablet, Take 800 mg by mouth Every 8 (Eight) Hours As Needed for Mild Pain ., Disp: , Rfl:   •  lactulose (CHRONULAC) 10 GM/15ML solution, TAKE 15 TO 30 ML BY MOUTH ONCE OR TWICE DAILY FOR CONSTIPATION TITRATE FOR REGULAR BOWEL, Disp: , Rfl: 0  •  montelukast (SINGULAIR) 10 MG tablet, Take 10 mg by mouth., Disp: , Rfl:   •  ondansetron ODT (ZOFRAN-ODT) 4 MG disintegrating tablet, Take 1-2 tablets by mouth Every 8 (Eight) Hours As Needed for Nausea or Vomiting., Disp: 15 tablet, Rfl: 0  •  pilocarpine (SALAGEN) 5 MG tablet, Take 5 mg by mouth., Disp: , Rfl:   •  solifenacin (VESICARE) 5 MG tablet, Take 5 mg by mouth., Disp: , Rfl:   •  sucralfate (CARAFATE) 1 g tablet, Take 1 g by mouth 2 (Two) Times a Day., Disp: , Rfl: 0  •  tamsulosin (FLOMAX) 0.4 MG capsule 24 hr capsule, Take  by mouth every night at bedtime., Disp: , Rfl: 0  •  vitamin D (ERGOCALCIFEROL) 70742 units capsule capsule, TK 1 C PO WEEKLY, Disp: , Rfl: 1    There is no immunization history on file for this patient.    Review of Systems       Except as outlined in history of physical illness, patient denies any changes in her GYN, , GI systems. All other systems reviewed are negative.    Objective   Physical Exam   Alert and oriented, respirations unlabored, heart regular rate and rhythm   Pelvic not repeated, limited to the ultrasound      Assessment/Plan   Gabi was seen today for gynecologic exam.    Diagnoses and all orders for this visit:    Pelvic pain in female  -         Ovarian mass, left  -       -     Ambulatory Referral to Gynecologic Oncology      As outlined in the HPI  SUZANNE BAZZI 68y Male  MRN#: [id number]   CODE STATUS: Full code    Hospital Day: 16d    Pt is currently admitted with the primary diagnosis of hepatic encephalopathy    SUBJECTIVE      Subjective complaints   Patient was comlianing from dyspnea as he had an aspiration in the morning  Present Today:   - Gerda:  No [  ], Yes [  x] : Indication:     - Type of IV Access:       .. CVC/Piccline:  No x  ], Yes [   ] : Indication:       .. Midline: No [x  ], Yes [   ] : Indication:                                             ----------------------------------------------------------  OBJECTIVE  PAST MEDICAL & SURGICAL HISTORY  HTN (hypertension)    Hepatitis C  2007    Drug abuse    Cancer, hepatocellular  January 2021    COPD, mild                                              -----------------------------------------------------------  ALLERGIES:  No Known Allergies                                            ------------------------------------------------------------    HOME MEDICATIONS  Home Medications:  Eliquis 5 mg oral tablet: 1 tab(s) orally 2 times a day (04 Oct 2021 06:13)  fluticasone-salmeterol 55 mcg-14 mcg/inh inhalation powder: 1 puff(s) inhaled 2 times a day (04 Oct 2021 06:14)  Protonix 40 mg oral delayed release tablet: 1 tab(s) orally once a day (04 Oct 2021 06:13)  spironolactone 50 mg oral tablet: 1 tab(s) orally once a day (04 Oct 2021 06:13)                           MEDICATIONS:  STANDING MEDICATIONS  budesonide  80 MICROgram(s)/formoterol 4.5 MICROgram(s) Inhaler 2 Puff(s) Inhalation two times a day  calcium acetate 667 milliGRAM(s) Oral three times a day with meals  chlorhexidine 4% Liquid 1 Application(s) Topical daily  dextrose 40% Gel 15 Gram(s) Oral once  dextrose 5%. 1000 milliLiter(s) IV Continuous <Continuous>  dextrose 5%. 1000 milliLiter(s) IV Continuous <Continuous>  dextrose 50% Injectable 25 Gram(s) IV Push once  dextrose 50% Injectable 12.5 Gram(s) IV Push once  dextrose 50% Injectable 25 Gram(s) IV Push once  fondaparinux Injectable 2.5 milliGRAM(s) SubCutaneous daily  glucagon  Injectable 1 milliGRAM(s) IntraMuscular once  insulin glargine Injectable (LANTUS) 20 Unit(s) SubCutaneous at bedtime  insulin lispro (ADMELOG) corrective regimen sliding scale   SubCutaneous three times a day before meals  insulin lispro Injectable (ADMELOG) 5 Unit(s) SubCutaneous three times a day before meals  lactulose Syrup 15 Gram(s) Oral three times a day  levoFLOXacin IVPB 750 milliGRAM(s) IV Intermittent every 48 hours  midodrine 10 milliGRAM(s) Oral every 8 hours  pantoprazole  Injectable 40 milliGRAM(s) IV Push every 12 hours  propranolol 5 milliGRAM(s) Oral daily  rifAXIMin 550 milliGRAM(s) Oral two times a day  sodium bicarbonate 650 milliGRAM(s) Oral every 8 hours    PRN MEDICATIONS                                            ------------------------------------------------------------  VITAL SIGNS: Last 24 Hours  T(C): 36.3 (20 Oct 2021 12:37), Max: 37.1 (19 Oct 2021 17:12)  T(F): 97.4 (20 Oct 2021 12:37), Max: 98.8 (19 Oct 2021 17:12)  HR: 86 (20 Oct 2021 12:37) (85 - 96)  BP: 121/65 (20 Oct 2021 12:37) (100/62 - 127/75)  BP(mean): 87 (20 Oct 2021 12:37) (84 - 96)  RR: 18 (20 Oct 2021 12:37) (18 - 19)  SpO2: 95% (20 Oct 2021 12:37) (93% - 97%)      10-19-21 @ 07:01  -  10-20-21 @ 07:00  --------------------------------------------------------  IN: 0 mL / OUT: 6500 mL / NET: -6500 mL    10-20-21 @ 07:01  -  10-20-21 @ 13:57  --------------------------------------------------------  IN: 0 mL / OUT: 200 mL / NET: -200 mL                                             --------------------------------------------------------------  LABS:                        10.4   16.23 )-----------( 130      ( 20 Oct 2021 07:54 )             34.3     10-20    150<H>  |  113<H>  |  110<HH>  ----------------------------<  55<L>  4.3   |  21  |  1.7<H>    Ca    8.7      20 Oct 2021 07:54  Mg     2.7     10-20    TPro  6.4  /  Alb  4.0  /  TBili  1.5<H>  /  DBili  x   /  AST  49<H>  /  ALT  23  /  AlkPhos  134<H>  10-20                                                              -------------------------------------------------------------  RADIOLOGY:                                            --------------------------------------------------------------    PHYSICAL EXAM:  General: alert oriented  HEENT: non remarkable  LUNGS: decreased air sounds on bases  HEART: RRR  ABDOMEN: distended, positive shifting dullness  EXT: edema up to thighs                                               --------------------------------------------------------------    ASSESSMENT & PLAN    67 yo male, with h/o HTN, drug abuse, Hepatitis C s/p INF, Liver cirrhosis s/p [place] shunt, COPD, DVT? on Eliquis, hepatocellular carcinoma since January 2021 on chemotherapy, presented for weakness and decreased oral intake along with constipation and hematemasis with ammonia (188) on presentation, Impression: Hepatic Encephalopathy    1- Hepatic Encephalopathy:  - Ammonia 188 on presentation  - Patient on rifaximin and lactulose to aim for 3 BM/day  - Mental status improved  - Patient had ascites that was drained through a [place] catheter, removed > 7 liters of fluid, patient expected to build up again in light of his liver decompensation (cirrhosis)  - Drained 3 Liters of ascitic fluid on 10/18 and will drain more today  - No SBP based on fluid studies and culture, need only for prophylaxis  - IR consulted for TIPS: recommended against due to multiple contraindications and MELD score >20    2- Variceal Bleed (portal HTN)  - Patient was intubated for airway protection, now extubated  - Hematemesis that was followed by an emergent EGD that showed variceal bleeds that were ligated (in esophagus)  - Patient started on abx prophylaxis after bleed and prophylaxis for SBP: ceftriaxone  - Propranolol 5 mg daily  - Protonix 40 mg IV BID  - Follow up Hb closely    3- HCC:  - Diagnosed in Jan 2021  - Was on chemotherapy to which he developed transaminitis  - Heme/Onc following the patient and recommended:  At present only palliative care.  If his performance status improves with nutrition and physical therapy, we will reevaluate for cancer therapy.  Please let the  see the patient for help with his health insurance issues  - Will follow up with Heme/onc    4- Septic shock:  - Patient had septic shock to which he was started on levophed on 10/4 and was stopped on the 10th of Oct  - Blood cultures are negative so far  - DTA culture showed Stenotrophomonas that is sensitive to levofloxacin: Levofloxacin started on 10/14  - Now resolved    5- Portal Vein Thrombosis:  - Patient currently on prophylactic dose of Heparin  - No bleeding since hematemesis, called the GI service and was told that Portal Vein is invaded by the thrombus itself and not a clot and therefore there is no need for full anticoagulation    6- COPD:  - patient on budesonide/formoterol nebulization    7- Hep C:  - treated with INF    8- EBER:  - Creatinine trending down today (Cr:2)  - Possibly abdominal comp vs Hepato-renal   - midodrine 10mg q 8 hr (started on 10/15)  - Albumin given on friday (10/15) monday (10/18) and tuesday (10/19)    9- VT:  - patient had VT on presentation to which he was shocked   - Hyperkalemia at that point was treated and resolved    10- Thrombocytopenia  - Patient with drop from 300 to 120 today  - Highly likely dilutional as all blood lineages have dropped  - Less likely HIT, will trend it for tomorrow                                                                                    ----------------------------------------------------  # DVT prophylaxis : Heparin 5000 q 8 hrs    # GI prophylaxis: protonix 40 mg IV BID     # Diet: NPO after aspiration episode will put an NG for him    # Activity Score (AM-PAC)    # Code status : full code    # Disposition : acute                                   patient continues to be symptomatic.  While I am not highly suspicious this is cancerous, we will check a Ca1 25 obtain a GYN oncology consult with patient's request for more definitive treatment.             This encounter/visit was over 35 minutes.  Greater than 50% of that time was spent with the patient, examining and discussing her symptoms and explaining care.  Some of the items discussed included pelvic mass multiple potential causes for abdominal and pelvic pain testing and diagnostic work-up and evaluation to help delineate etiologies        EMR Dragon/ Transcription disclaimer:  Much of the encounter note is an electronic transcription/translation of spoken language to printed text. The electronic translation of spoken language may permit erroneous, or at times, nonessential words or phrases to be inadvertently transcribes; Although i have reviewed the note for such errors, some may still exist.

## 2022-01-26 ENCOUNTER — OFFICE VISIT (OUTPATIENT)
Dept: NEUROLOGY | Facility: CLINIC | Age: 62
End: 2022-01-26

## 2022-01-26 VITALS
DIASTOLIC BLOOD PRESSURE: 82 MMHG | BODY MASS INDEX: 38.09 KG/M2 | OXYGEN SATURATION: 98 % | HEIGHT: 63 IN | SYSTOLIC BLOOD PRESSURE: 128 MMHG | HEART RATE: 84 BPM

## 2022-01-26 DIAGNOSIS — E53.8 B12 DEFICIENCY: Primary | ICD-10-CM

## 2022-01-26 DIAGNOSIS — G25.0 BENIGN ESSENTIAL TREMOR: ICD-10-CM

## 2022-01-26 DIAGNOSIS — R20.2 PARESTHESIAS: ICD-10-CM

## 2022-01-26 PROCEDURE — 99213 OFFICE O/P EST LOW 20 MIN: CPT | Performed by: PSYCHIATRY & NEUROLOGY

## 2022-01-26 NOTE — PROGRESS NOTES
Chief Complaint   Patient presents with   • Numbness       Patient ID: Gabi Tomas is a 61 y.o. female.    HPI: I have had the pleasure of seeing your patient today.  As you may know she is a 61-year-old female with a history of tremor, numbness, fatigue and vitamin B12 deficiency.  We did check her vitamin B12 level at her last clinic visit which was 424.  She has had levels down to 272 in the past.  We did start her on a vitamin B12 injection protocol.  Unfortunately she got sick with COVID-19 infection and was feeling poorly for at least 2 months.  She has since recovered and is still having some of the same symptoms.  She does have benign essential tremor versus psychogenic tremor.  We decided to forego medical management of that previously.  She denies any significant change in this.  She has not had any interval development of resting tremor.  No significant change from baseline with respect to her gait.  No excessive pooling of saliva or drooling.  No recent falls.  However she did have 3-4 falls last year.    The following portions of the patient's history were reviewed and updated as appropriate: allergies, current medications, past family history, past medical history, past social history, past surgical history and problem list.    Review of Systems   Constitutional: Positive for fatigue. Negative for activity change and appetite change.   Musculoskeletal: Positive for gait problem (3-4 falls in last year. balance issues every day). Negative for back pain and neck pain.   Neurological: Positive for dizziness, tremors, speech difficulty, weakness, light-headedness, numbness and headaches. Negative for seizures, syncope and facial asymmetry.   Hematological: Negative for adenopathy. Does not bruise/bleed easily.   Psychiatric/Behavioral: Positive for agitation, confusion, decreased concentration, dysphoric mood and sleep disturbance. Negative for behavioral problems, hallucinations, self-injury and  suicidal ideas. The patient is not nervous/anxious and is not hyperactive.       I have reviewed the review of systems above performed by my medical assistant.      Vitals:    22 1531   BP: 128/82   Pulse: 84   SpO2: 98%       Neurologic Exam     Mental Status   Oriented to person, place, and time.   Concentration: normal.   Level of consciousness: alert  Knowledge: consistent with education (No deficits found.).     Cranial Nerves     CN II   Visual fields full to confrontation.     CN III, IV, VI   Pupils are equal, round, and reactive to light.  Extraocular motions are normal.   CN III: no CN III palsy  CN VI: no CN VI palsy    CN V   Facial sensation intact.     CN VII   Facial expression full, symmetric.     CN VIII   CN VIII normal.     CN IX, X   CN IX normal.   CN X normal.     CN XI   CN XI normal.     CN XII   CN XII normal.     Motor Exam     Strength   Right neck flexion: 5/5  Left neck flexion: 5/5  Right neck extension: 5/5  Left neck extension: 5/5  Right deltoid: 5/5  Left deltoid: 5/5  Right biceps: 5/5  Left biceps: 5/5  Right triceps: 5/5  Left triceps: 5/5  Right wrist flexion: 5/5  Left wrist flexion: 5/5  Right wrist extension: 5/5  Left wrist extension: 5/5  Right interossei: 5/5  Left interossei: 5/5  Right abdominals: 5/5  Left abdominals: 5/5  Right iliopsoas: 5/5  Left iliopsoas: 5/5  Right quadriceps: 5/5  Left quadriceps: 5/5  Right hamstrin/5  Left hamstrin/5  Right glutei: 5/5  Left glutei: 5/5  Right anterior tibial: 5/5  Left anterior tibial: 5/5  Right posterior tibial: 5/5  Left posterior tibial: 5/5  Right peroneal: 5/5  Left peroneal: 5/5  Right gastroc: 5/5  Left gastroc: 5/5    Sensory Exam   Light touch normal.   Vibration normal.     Gait, Coordination, and Reflexes     Gait  Gait: normal    Reflexes   Right brachioradialis: 2+  Left brachioradialis: 2+  Right biceps: 2+  Left biceps: 2+  Right triceps: 2+  Left triceps: 2+  Right patellar: 2+  Left patellar:  2+  Right achilles: 2+  Left achilles: 2+  Right : 2+  Left : 2+Station is normal.  Again patient has tremor in multiple different positions that is very inconsistent.  She certainly does not have a true resting tremor.       Physical Exam  Vitals reviewed.   Constitutional:       Appearance: She is well-developed.   HENT:      Head: Normocephalic and atraumatic.   Eyes:      Extraocular Movements: EOM normal.      Pupils: Pupils are equal, round, and reactive to light.   Cardiovascular:      Rate and Rhythm: Normal rate and regular rhythm.   Pulmonary:      Breath sounds: Normal breath sounds.   Musculoskeletal:         General: Normal range of motion.   Skin:     General: Skin is warm.   Neurological:      Mental Status: She is oriented to person, place, and time.      Gait: Gait is intact.      Deep Tendon Reflexes:      Reflex Scores:       Tricep reflexes are 2+ on the right side and 2+ on the left side.       Bicep reflexes are 2+ on the right side and 2+ on the left side.       Brachioradialis reflexes are 2+ on the right side and 2+ on the left side.       Patellar reflexes are 2+ on the right side and 2+ on the left side.       Achilles reflexes are 2+ on the right side and 2+ on the left side.        Procedures    Assessment/Plan: At this time I would like for her to have a vitamin B12 level once again.  If it is still low we will move forward with a vitamin B12 injection protocol for her.  For now we will forego medical management of any type of tremor.  It does appear to potentially be psychogenic versus mild benign essential tremor.  We will see her back in approximately 4 months or sooner if needed.  She is to call for results of B12 level.  A total of 20 minutes was spent face-to-face with the patient today.  Of that greater than 50% of this time was spent discussing signs and symptoms of tremor, B12 deficiency, patient education, plan of care and prognosis.     Diagnoses and all orders for  this visit:    1. B12 deficiency (Primary)  -     Vitamin B12    2. Paresthesias  -     Vitamin B12    3. Benign essential tremor           Jona Garcia II, MD

## 2022-02-10 ENCOUNTER — LAB (OUTPATIENT)
Dept: LAB | Facility: HOSPITAL | Age: 62
End: 2022-02-10

## 2022-02-10 LAB — VIT B12 BLD-MCNC: 686 PG/ML (ref 211–946)

## 2022-02-10 PROCEDURE — 82607 VITAMIN B-12: CPT | Performed by: PSYCHIATRY & NEUROLOGY

## 2022-02-10 PROCEDURE — 36415 COLL VENOUS BLD VENIPUNCTURE: CPT | Performed by: PSYCHIATRY & NEUROLOGY

## 2022-02-14 ENCOUNTER — TELEPHONE (OUTPATIENT)
Dept: NEUROLOGY | Facility: CLINIC | Age: 62
End: 2022-02-14

## 2022-02-14 DIAGNOSIS — E53.8 B12 DEFICIENCY: Primary | ICD-10-CM

## 2022-02-14 NOTE — TELEPHONE ENCOUNTER
----- Message from Gabi Tomas sent at 2/14/2022  8:21 AM EST -----  Regarding: B12 Test Results   Yes, please send to my pharmacy. Thank you so much!

## 2022-04-26 ENCOUNTER — OFFICE VISIT (OUTPATIENT)
Dept: NEUROLOGY | Facility: CLINIC | Age: 62
End: 2022-04-26

## 2022-04-26 VITALS
DIASTOLIC BLOOD PRESSURE: 86 MMHG | WEIGHT: 219 LBS | HEIGHT: 63 IN | BODY MASS INDEX: 38.8 KG/M2 | HEART RATE: 86 BPM | SYSTOLIC BLOOD PRESSURE: 128 MMHG | OXYGEN SATURATION: 97 %

## 2022-04-26 DIAGNOSIS — E53.8 B12 DEFICIENCY: Primary | ICD-10-CM

## 2022-04-26 DIAGNOSIS — Z86.69 HISTORY OF NEUROPATHY: ICD-10-CM

## 2022-04-26 DIAGNOSIS — R25.1 FUNCTIONAL TREMOR: ICD-10-CM

## 2022-04-26 DIAGNOSIS — R20.2 PARESTHESIAS: ICD-10-CM

## 2022-04-26 PROBLEM — M54.12 CERVICAL RADICULOPATHY: Status: ACTIVE | Noted: 2022-02-18

## 2022-04-26 PROBLEM — G20 PARKINSON'S DISEASE: Status: ACTIVE | Noted: 2022-02-07

## 2022-04-26 PROBLEM — R11.0 NAUSEA: Status: ACTIVE | Noted: 2017-01-10

## 2022-04-26 PROBLEM — Z79.899 OTHER LONG TERM (CURRENT) DRUG THERAPY: Status: ACTIVE | Noted: 2022-02-07

## 2022-04-26 PROBLEM — K29.70 GASTRITIS DETERMINED BY ENDOSCOPY: Status: ACTIVE | Noted: 2019-04-18

## 2022-04-26 PROBLEM — Z86.16 PERSONAL HISTORY OF COVID-19: Status: ACTIVE | Noted: 2022-02-07

## 2022-04-26 PROBLEM — Z20.828 CONTACT WITH AND (SUSPECTED) EXPOSURE TO OTHER VIRAL COMMUNICABLE DISEASES: Status: ACTIVE | Noted: 2020-08-17

## 2022-04-26 PROBLEM — G20.A1 PARKINSON'S DISEASE: Status: ACTIVE | Noted: 2022-02-07

## 2022-04-26 PROBLEM — R68.89 INFLUENZA-LIKE SYMPTOMS: Status: ACTIVE | Noted: 2018-02-01

## 2022-04-26 PROBLEM — R44.1 VISUAL HALLUCINATION: Status: ACTIVE | Noted: 2018-09-11

## 2022-04-26 PROBLEM — M77.8 OTHER ENTHESOPATHIES, NOT ELSEWHERE CLASSIFIED: Status: ACTIVE | Noted: 2022-02-07

## 2022-04-26 PROBLEM — N75.0 CYST OF BARTHOLIN'S GLAND DUCT: Status: ACTIVE | Noted: 2019-10-08

## 2022-04-26 PROBLEM — U07.1 COVID-19: Status: ACTIVE | Noted: 2021-08-17

## 2022-04-26 PROBLEM — R22.41 SUBCUTANEOUS MASS OF RIGHT LOWER EXTREMITY: Status: ACTIVE | Noted: 2020-08-28

## 2022-04-26 PROBLEM — J32.9 SINUSITIS: Status: ACTIVE | Noted: 2018-09-11

## 2022-04-26 PROBLEM — Z87.898 HISTORY OF PALPITATIONS: Status: ACTIVE | Noted: 2021-08-17

## 2022-04-26 PROBLEM — J04.0 ACUTE LARYNGITIS: Status: ACTIVE | Noted: 2018-11-01

## 2022-04-26 PROBLEM — Z87.442 HISTORY OF RENAL CALCULI: Status: ACTIVE | Noted: 2019-10-18

## 2022-04-26 PROBLEM — Z85.41 PERSONAL HISTORY OF MALIGNANT NEOPLASM OF CERVIX UTERI: Status: ACTIVE | Noted: 2019-06-06

## 2022-04-26 PROBLEM — J06.9 ACUTE UPPER RESPIRATORY INFECTION: Status: ACTIVE | Noted: 2018-10-25

## 2022-04-26 PROBLEM — N94.89 OTHER SPECIFIED CONDITIONS ASSOCIATED WITH FEMALE GENITAL ORGANS AND MENSTRUAL CYCLE: Status: ACTIVE | Noted: 2019-03-28

## 2022-04-26 PROCEDURE — 99214 OFFICE O/P EST MOD 30 MIN: CPT | Performed by: PSYCHIATRY & NEUROLOGY

## 2022-04-26 RX ORDER — TIZANIDINE 4 MG/1
4 TABLET ORAL EVERY 6 HOURS PRN
COMMUNITY
Start: 2022-02-18

## 2022-04-26 RX ORDER — ALPRAZOLAM 0.5 MG/1
0.5 TABLET ORAL
COMMUNITY
End: 2022-08-29

## 2022-04-26 RX ORDER — TRAMADOL HYDROCHLORIDE 50 MG/1
TABLET ORAL AS NEEDED
COMMUNITY
Start: 2022-02-21 | End: 2022-10-11

## 2022-04-26 RX ORDER — FAMOTIDINE 20 MG/1
20 TABLET, FILM COATED ORAL DAILY
COMMUNITY

## 2022-04-26 RX ORDER — MONTELUKAST SODIUM 10 MG/1
10 TABLET ORAL
COMMUNITY
End: 2022-08-29

## 2022-04-26 RX ORDER — GABAPENTIN 600 MG/1
600 TABLET ORAL 3 TIMES DAILY
Qty: 90 TABLET | Refills: 1
Start: 2022-04-26 | End: 2022-06-01 | Stop reason: SDUPTHER

## 2022-04-26 RX ORDER — PREDNISONE 10 MG/1
TABLET ORAL
COMMUNITY
Start: 2022-02-18 | End: 2022-08-29

## 2022-04-26 RX ORDER — DOXYCYCLINE HYCLATE 100 MG/1
CAPSULE ORAL
COMMUNITY
Start: 2022-03-02 | End: 2022-10-11

## 2022-04-26 NOTE — PROGRESS NOTES
Chief Complaint   Patient presents with   • Numbness   • Migraine       Patient ID: Gabi Tomas is a 62 y.o. female.    HPI: I have had the pleasure of seeing your patient today.  As you may know she is a 62-year-old female here for management of tremors.  She says that she has some newer symptoms.  She describes some blurry vision that she says that she started experiencing back in February of last year.  She says that it would be difficult for her to watch TV due to the blurriness.  She says that its in both eyes.  Right greater than left.  She says that her symptoms seem to worsen after COVID in August of last year.  While in the hospital during her COVID infection she had an ophthalmologically evaluation which apparently was abnormal.  I have not yet reviewed the records of that evaluation.  She was to follow-up with an ophthalmologist however the patient says that she did not go to that follow-up.  She also has been having some word finding difficulties for the past year.  It is very frustrating for her.  She has a lot of tingling in her feet bilaterally.  This is a constant symptom.  No particular exacerbating or alleviating factors for that.  She has had a history of B12 deficiency in the past however is currently not on B12 injections.  She does take oral supplementation now.  No history of diabetes.  She is on gabapentin 600 mg 3 times daily.    The following portions of the patient's history were reviewed and updated as appropriate: allergies, current medications, past family history, past medical history, past social history, past surgical history and problem list.    Review of Systems   Constitutional: Positive for appetite change and fatigue. Negative for activity change.   HENT: Positive for dental problem, hearing loss, sinus pressure and sinus pain. Negative for facial swelling and mouth sores.    Eyes: Positive for visual disturbance.   Respiratory: Positive for shortness of breath. Negative for  chest tightness.    Cardiovascular: Positive for palpitations. Negative for chest pain and leg swelling.   Gastrointestinal: Negative for nausea and vomiting.   Endocrine: Positive for cold intolerance and heat intolerance.   Genitourinary: Positive for frequency and urgency.   Musculoskeletal: Positive for back pain, gait problem, neck pain and neck stiffness.   Neurological: Positive for dizziness, tremors, weakness, light-headedness, numbness and headaches. Negative for seizures, syncope and speech difficulty.   Hematological: Does not bruise/bleed easily.   Psychiatric/Behavioral: Positive for agitation, confusion and decreased concentration. Negative for behavioral problems, hallucinations, self-injury and suicidal ideas. The patient is nervous/anxious. The patient is not hyperactive.       I have reviewed the review of systems above performed by my medical assistant.      Vitals:    04/26/22 1334   BP: 128/86   Pulse: 86   SpO2: 97%       Neurologic Exam     Mental Status   Oriented to person, place, and time.   Concentration: normal.   Level of consciousness: alert  Knowledge: consistent with education (No deficits found.).     Cranial Nerves     CN II   Visual fields full to confrontation.     CN III, IV, VI   Pupils are equal, round, and reactive to light.  Extraocular motions are normal.   CN III: no CN III palsy  CN VI: no CN VI palsy    CN V   Facial sensation intact.     CN VII   Facial expression full, symmetric.     CN VIII   CN VIII normal.     CN IX, X   CN IX normal.   CN X normal.     CN XI   CN XI normal.     CN XII   CN XII normal.     Motor Exam     Strength   Right neck flexion: 5/5  Left neck flexion: 5/5  Right neck extension: 5/5  Left neck extension: 5/5  Right deltoid: 5/5  Left deltoid: 5/5  Right biceps: 5/5  Left biceps: 5/5  Right triceps: 5/5  Left triceps: 5/5  Right wrist flexion: 5/5  Left wrist flexion: 5/5  Right wrist extension: 5/5  Left wrist extension: 5/5  Right interossei:  5/5  Left interossei: 5/5  Right abdominals: 5/5  Left abdominals: 5/5  Right iliopsoas: 5/5  Left iliopsoas: 5/5  Right quadriceps: 5/5  Left quadriceps: 5/5  Right hamstrin/5  Left hamstrin/5  Right glutei: 5/5  Left glutei: 5/5  Right anterior tibial: 5/5  Left anterior tibial: 5/5  Right posterior tibial: 5/5  Left posterior tibial: 5/5  Right peroneal: 5/5  Left peroneal: 5/5  Right gastroc: 5/5  Left gastroc: 5/5    Sensory Exam   Light touch normal.   Vibration normal.     Gait, Coordination, and Reflexes     Gait  Gait: normal    Reflexes   Right brachioradialis: 2+  Left brachioradialis: 2+  Right biceps: 2+  Left biceps: 2+  Right triceps: 2+  Left triceps: 2+  Right patellar: 2+  Left patellar: 2+  Right achilles: 2+  Left achilles: 2+  Right : 2+  Left : 2+Station is normal.  Patient with very inconsistent, functional appearing tremor.       Physical Exam  Vitals reviewed.   Constitutional:       Appearance: She is well-developed.   HENT:      Head: Normocephalic and atraumatic.   Eyes:      Extraocular Movements: EOM normal.      Pupils: Pupils are equal, round, and reactive to light.   Cardiovascular:      Rate and Rhythm: Normal rate and regular rhythm.   Pulmonary:      Breath sounds: Normal breath sounds.   Musculoskeletal:         General: Normal range of motion.   Skin:     General: Skin is warm.   Neurological:      Mental Status: She is oriented to person, place, and time.      Gait: Gait is intact.      Deep Tendon Reflexes:      Reflex Scores:       Tricep reflexes are 2+ on the right side and 2+ on the left side.       Bicep reflexes are 2+ on the right side and 2+ on the left side.       Brachioradialis reflexes are 2+ on the right side and 2+ on the left side.       Patellar reflexes are 2+ on the right side and 2+ on the left side.       Achilles reflexes are 2+ on the right side and 2+ on the left side.        Procedures    Assessment/Plan: We will schedule her for an  EMG/nerve conduction study of both lower extremities given the paresthesias in both of her feet.  Increase her gabapentin to 600 mg 3 times daily.  Return to clinic in 3 months or sooner if needed.  A total of 30 minutes was spent face-to-face with patient today.  Of that greater than 50% of this time was spent discussing signs and symptoms of tremor, blurry vision, paresthesias, patient education, plan of care and prognosis.       Diagnoses and all orders for this visit:    1. B12 deficiency (Primary)  -     gabapentin (NEURONTIN) 600 MG tablet; Take 1 tablet by mouth 3 (Three) Times a Day for 30 days. 2 tid  Dispense: 90 tablet; Refill: 1    2. Paresthesias  -     gabapentin (NEURONTIN) 600 MG tablet; Take 1 tablet by mouth 3 (Three) Times a Day for 30 days. 2 tid  Dispense: 90 tablet; Refill: 1  -     EMG & Nerve Conduction Test; Future    3. Functional tremor    4. History of neuropathy  -     gabapentin (NEURONTIN) 600 MG tablet; Take 1 tablet by mouth 3 (Three) Times a Day for 30 days. 2 tid  Dispense: 90 tablet; Refill: 1  -     EMG & Nerve Conduction Test; Future           Jona Garcia II, MD

## 2022-06-01 ENCOUNTER — PATIENT MESSAGE (OUTPATIENT)
Dept: NEUROLOGY | Facility: CLINIC | Age: 62
End: 2022-06-01

## 2022-06-01 DIAGNOSIS — E53.8 B12 DEFICIENCY: ICD-10-CM

## 2022-06-01 DIAGNOSIS — Z86.69 HISTORY OF NEUROPATHY: ICD-10-CM

## 2022-06-01 DIAGNOSIS — R20.2 PARESTHESIAS: ICD-10-CM

## 2022-06-01 RX ORDER — GABAPENTIN 800 MG/1
TABLET ORAL
Qty: 90 TABLET | Refills: 1
Start: 2022-06-01 | End: 2022-06-10 | Stop reason: SDUPTHER

## 2022-06-01 NOTE — TELEPHONE ENCOUNTER
"Spoke to patient, willing to try increase for Gabapentin. Patient also states \"that since being taken off carbidopa-levodopa her pain has come back with a vengeance\"     Patient is still concerned with the discoloration of her foot please advise    "

## 2022-06-01 NOTE — TELEPHONE ENCOUNTER
From: Gabi Tomas  To: Jona Garcia II, MD  Sent: 6/1/2022 1:54 PM EDT  Subject: New health issues since taken off Carbidopa/Levadopa     My hands & feet have been asleep 24/7 for 5 days now.     My hands & feet are very painful now 24/7.     My feet have been turning pink, purple & black a few times a day.     My EMG is scheduled for August 12.     My next appointment with you is August 29.     Can you please advise what I can do for pain until I see you. I don’t have any pain medicine left and Advil isn’t helping enough.     Thank you,  Gabi Tomas  [phone]

## 2022-06-08 DIAGNOSIS — Z86.69 HISTORY OF NEUROPATHY: ICD-10-CM

## 2022-06-08 DIAGNOSIS — R20.2 PARESTHESIAS: ICD-10-CM

## 2022-06-08 NOTE — TELEPHONE ENCOUNTER
Caller: WILLEM PATRICK    Relationship: SELF     Which medication are you concerned about:  Gabapentin     Who prescribed you this medication: DR. CASTANEDA    What are your concerns: PT IS NEEDING THE Gabapentin from 600 mg 3x day to 800 3x day. A prescription has not been called in for it, to-date. PT IS SAYING THAT BOTH ARMS ARE ASLEEP ALL THE WAY UP TO HER SHOULDERS AND IT HURTS TO STEP ON HER FEET.      PLEASE CALL HER BACK AS SOON AS POSSIBLE -[phone]

## 2022-06-10 RX ORDER — GABAPENTIN 800 MG/1
TABLET ORAL
Qty: 90 TABLET | Refills: 3 | Status: SHIPPED | OUTPATIENT
Start: 2022-06-10 | End: 2022-10-11 | Stop reason: SDUPTHER

## 2022-06-10 NOTE — TELEPHONE ENCOUNTER
Please you increased pt medication to 800mg po tid. This needs to be sent for new script please advise and approve thank you.     I also explained to pt when she was discussing how much pain she was in that if it continued she needed to be checked out in ER. She stated understanding.

## 2022-08-11 ENCOUNTER — TELEPHONE (OUTPATIENT)
Dept: NEUROLOGY | Facility: CLINIC | Age: 62
End: 2022-08-11

## 2022-08-11 ENCOUNTER — TRANSCRIBE ORDERS (OUTPATIENT)
Dept: ADMINISTRATIVE | Facility: HOSPITAL | Age: 62
End: 2022-08-11

## 2022-08-11 ENCOUNTER — TELEPHONE (OUTPATIENT)
Dept: OBSTETRICS AND GYNECOLOGY | Age: 62
End: 2022-08-11

## 2022-08-11 DIAGNOSIS — N95.8 OTHER SPECIFIED MENOPAUSAL AND PERIMENOPAUSAL DISORDERS: ICD-10-CM

## 2022-08-11 DIAGNOSIS — Z13.820 OSTEOPOROSIS SCREENING: Primary | ICD-10-CM

## 2022-08-11 DIAGNOSIS — Z12.31 SCREENING MAMMOGRAM FOR BREAST CANCER: Primary | ICD-10-CM

## 2022-08-11 NOTE — TELEPHONE ENCOUNTER
Caller: Gabi Tomas    Relationship: Self    Best call back number: [phone]    What orders are you requesting (i.e. lab or imaging): BONE DENSITY SCAN    In what timeframe would the patient need to come in: ASAP    Where will you receive your lab/imaging services: [facility]    Additional notes: PT IS SCHEDULED FOR HER 3D MAMMO ON 8/17 @ 10 A.M. - THEY SAID THEY COULD DO HER BONE DENSITY AT SAME TIME, THEY JUST NEED TO GET AN ORDER OR APPROVAL FROM DR. ZAMBRANO.    PLEASE CALL PT TO CONFIRM SENT OVER.

## 2022-08-12 ENCOUNTER — APPOINTMENT (OUTPATIENT)
Dept: INFUSION THERAPY | Facility: HOSPITAL | Age: 62
End: 2022-08-12

## 2022-08-17 ENCOUNTER — HOSPITAL ENCOUNTER (OUTPATIENT)
Dept: MAMMOGRAPHY | Facility: HOSPITAL | Age: 62
Discharge: HOME | End: 2022-08-17
Admitting: OBSTETRICS & GYNECOLOGY

## 2022-08-17 DIAGNOSIS — Z12.31 SCREENING MAMMOGRAM FOR BREAST CANCER: ICD-10-CM

## 2022-08-17 PROCEDURE — 77067 SCR MAMMO BI INCL CAD: CPT

## 2022-08-17 PROCEDURE — 77063 BREAST TOMOSYNTHESIS BI: CPT

## 2022-08-29 ENCOUNTER — OFFICE VISIT (OUTPATIENT)
Dept: NEUROLOGY | Facility: CLINIC | Age: 62
End: 2022-08-29

## 2022-08-29 VITALS
BODY MASS INDEX: 39.51 KG/M2 | OXYGEN SATURATION: 98 % | HEART RATE: 81 BPM | DIASTOLIC BLOOD PRESSURE: 88 MMHG | SYSTOLIC BLOOD PRESSURE: 132 MMHG | WEIGHT: 223 LBS | HEIGHT: 63 IN

## 2022-08-29 DIAGNOSIS — G89.29 CHRONIC NECK PAIN: ICD-10-CM

## 2022-08-29 DIAGNOSIS — R25.1 FUNCTIONAL TREMOR: Primary | ICD-10-CM

## 2022-08-29 DIAGNOSIS — E53.8 B12 DEFICIENCY: ICD-10-CM

## 2022-08-29 DIAGNOSIS — R20.2 PARESTHESIAS: ICD-10-CM

## 2022-08-29 DIAGNOSIS — M54.2 CHRONIC NECK PAIN: ICD-10-CM

## 2022-08-29 DIAGNOSIS — M79.7 FIBROMYALGIA: ICD-10-CM

## 2022-08-29 DIAGNOSIS — G89.29 CHRONIC BILATERAL LOW BACK PAIN WITH BILATERAL SCIATICA: ICD-10-CM

## 2022-08-29 DIAGNOSIS — M54.42 CHRONIC BILATERAL LOW BACK PAIN WITH BILATERAL SCIATICA: ICD-10-CM

## 2022-08-29 DIAGNOSIS — M54.41 CHRONIC BILATERAL LOW BACK PAIN WITH BILATERAL SCIATICA: ICD-10-CM

## 2022-08-29 PROBLEM — Z87.19 HISTORY OF GASTROESOPHAGEAL REFLUX (GERD): Status: ACTIVE | Noted: 2022-06-10

## 2022-08-29 PROBLEM — Z87.448 PERSONAL HISTORY OF OTHER DISEASES OF URINARY SYSTEM: Status: ACTIVE | Noted: 2021-08-17

## 2022-08-29 PROBLEM — K59.00 CONSTIPATION: Status: ACTIVE | Noted: 2017-01-10

## 2022-08-29 PROBLEM — K57.90 DIVERTICULAR DISEASE: Status: ACTIVE | Noted: 2022-06-10

## 2022-08-29 PROBLEM — Z86.39 H/O: HYPOTHYROIDISM: Status: ACTIVE | Noted: 2022-06-10

## 2022-08-29 PROBLEM — Z87.39 H/O: OSTEOARTHRITIS: Status: ACTIVE | Noted: 2022-06-10

## 2022-08-29 PROBLEM — R10.9 ABDOMINAL WALL PAIN: Status: ACTIVE | Noted: 2022-06-10

## 2022-08-29 PROBLEM — J20.9 ACUTE BRONCHITIS: Status: ACTIVE | Noted: 2022-06-10

## 2022-08-29 PROCEDURE — 99214 OFFICE O/P EST MOD 30 MIN: CPT | Performed by: PSYCHIATRY & NEUROLOGY

## 2022-08-29 RX ORDER — METHOCARBAMOL 500 MG/1
500 TABLET, FILM COATED ORAL 3 TIMES DAILY PRN
COMMUNITY
Start: 2022-06-20

## 2022-08-29 RX ORDER — ROSUVASTATIN CALCIUM 20 MG/1
20 TABLET, COATED ORAL DAILY
COMMUNITY
Start: 2022-08-16

## 2022-08-29 RX ORDER — ERGOCALCIFEROL 1.25 MG/1
50000 CAPSULE ORAL WEEKLY
COMMUNITY
Start: 2022-08-16

## 2022-08-29 RX ORDER — CYCLOBENZAPRINE HCL 5 MG
TABLET ORAL
COMMUNITY
Start: 2022-06-10 | End: 2022-10-11

## 2022-08-29 RX ORDER — GABAPENTIN 800 MG/1
800 TABLET ORAL 3 TIMES DAILY
COMMUNITY
Start: 2022-06-10 | End: 2023-01-04

## 2022-08-29 NOTE — PROGRESS NOTES
"Chief Complaint   Patient presents with   • Numbness       Patient ID: Gabi Tomsa is a 62 y.o. female.    HPI: I have had the pleasure of seeing your patient today.  As you may know she is a 62-year-old female here for the management of chronic pain.  She says that she continues to have a lot of pain essentially in all extremities.  At her last clinic visit I had asked her to discontinue the Sinemet seeing how I felt the tremor she had was not a true Parkinson's type tremor.  She did discontinue it.  She says that she has more pain since discontinuing Sinemet.  The pain is in her hands, feet, arms and legs.  She says that she feels it in the \"bones and muscles\".  She has a lot of excessive daytime fatigue and tiredness.  She does have a history of both vitamin B12 and vitamin D deficiencies and are on replacement currently.  She has poor sleep at night however she does nap several times throughout the day.  She complains of chemical smells however she has had issues with that in the past requiring ENT surgical intervention.  She did lose her smell with COVID-19 as well.  She has a lot of back pain with bilateral sciatica.  She has a lot of neck pain as well still.  She did mention a \"tickling\" like sensation of the right face.  She does mention a history of shingles in the right face.    The following portions of the patient's history were reviewed and updated as appropriate: allergies, current medications, past family history, past medical history, past social history, past surgical history and problem list.    Review of Systems   Neurological: Positive for dizziness, tremors, speech difficulty, weakness, light-headedness, numbness and headaches. Negative for seizures and syncope.   Hematological: Bruises/bleeds easily.   Psychiatric/Behavioral: Positive for agitation, behavioral problems, confusion, decreased concentration and sleep disturbance. Negative for hallucinations, self-injury and suicidal ideas. The " patient is nervous/anxious.       I have reviewed the review of systems above performed by my medical assistant.      Vitals:    22 0852   BP: 132/88   Pulse: 81   SpO2: 98%       Neurologic Exam     Mental Status   Oriented to person, place, and time.   Concentration: normal.   Level of consciousness: alert  Knowledge: consistent with education (No deficits found.).     Cranial Nerves     CN II   Visual fields full to confrontation.     CN III, IV, VI   Pupils are equal, round, and reactive to light.  Extraocular motions are normal.   CN III: no CN III palsy  CN VI: no CN VI palsy    CN V   Facial sensation intact.     CN VII   Facial expression full, symmetric.     CN VIII   CN VIII normal.     CN IX, X   CN IX normal.   CN X normal.     CN XI   CN XI normal.     CN XII   CN XII normal.     Motor Exam     Strength   Right neck flexion: 5/5  Left neck flexion: 5/5  Right neck extension: 5/5  Left neck extension: 5/5  Right deltoid: 5/5  Left deltoid: 5/5  Right biceps: 5/5  Left biceps: 5/5  Right triceps: 5/5  Left triceps: 5/5  Right wrist flexion: 5/5  Left wrist flexion: 5/5  Right wrist extension: 5/5  Left wrist extension: 5/5  Right interossei: 5/5  Left interossei: 5/5  Right abdominals: 5/5  Left abdominals: 5/5  Right iliopsoas: 5/5  Left iliopsoas: 5/5  Right quadriceps: 5/5  Left quadriceps: 5/5  Right hamstrin/5  Left hamstrin/5  Right glutei: 5/5  Left glutei: 5/5  Right anterior tibial: 5/5  Left anterior tibial: 5/5  Right posterior tibial: 5/5  Left posterior tibial: 5/5  Right peroneal: 5/5  Left peroneal: 5/5  Right gastroc: 5/5  Left gastroc: 5/5    Sensory Exam   Light touch normal.   Vibration normal.     Gait, Coordination, and Reflexes     Gait  Gait: normal    Reflexes   Right brachioradialis: 2+  Left brachioradialis: 2+  Right biceps: 2+  Left biceps: 2+  Right triceps: 2+  Left triceps: 2+  Right patellar: 2+  Left patellar: 2+  Right achilles: 2+  Left achilles: 2+  Right  : 2+  Left : 2+Station is normal.  There is what appears to be an inconsistent or functional type tremor.       Physical Exam  Vitals reviewed.   Constitutional:       Appearance: She is well-developed.   HENT:      Head: Normocephalic and atraumatic.   Eyes:      Extraocular Movements: EOM normal.      Pupils: Pupils are equal, round, and reactive to light.   Cardiovascular:      Rate and Rhythm: Normal rate and regular rhythm.   Pulmonary:      Breath sounds: Normal breath sounds.   Musculoskeletal:         General: Normal range of motion.   Skin:     General: Skin is warm.   Neurological:      Mental Status: She is oriented to person, place, and time.      Gait: Gait is intact.      Deep Tendon Reflexes:      Reflex Scores:       Tricep reflexes are 2+ on the right side and 2+ on the left side.       Bicep reflexes are 2+ on the right side and 2+ on the left side.       Brachioradialis reflexes are 2+ on the right side and 2+ on the left side.       Patellar reflexes are 2+ on the right side and 2+ on the left side.       Achilles reflexes are 2+ on the right side and 2+ on the left side.        Procedures    Assessment/Plan: We are going to refer her to a pain management specialist.  She is scheduled for an EMG/nerve conduction study in October.  We will review this with her at her follow-up appointment in approximately 4 months.  A total of 30 minutes was spent face-to-face with the patient today.  Of that greater than 50% of this time was spent discussing signs and symptoms of chronic pain, fibromyalgia, paresthesias, patient education, plan of care and prognosis.       Diagnoses and all orders for this visit:    1. Functional tremor (Primary)    2. Paresthesias    3. B12 deficiency    4. Fibromyalgia  -     Ambulatory Referral to Pain Management    5. Chronic neck pain  -     Ambulatory Referral to Pain Management    6. Chronic bilateral low back pain with bilateral sciatica  -     Ambulatory Referral  to Pain Management           Jona Garcia II, MD

## 2022-10-10 NOTE — PROGRESS NOTES
The patient has a pain history of the following:  Fibromyalgia  Chronic neck pain  Chronic low back pain  Lumbar radiculitis  Migraines   Peripheral neuropathy     Previous interventions that the patient has received include:   Right stellate ganglion block - diagnostic results, but severe pain 3 days after  Bilateral L3-5 Radiofrequency ablation (thermal, non-pulsed) 11/29/17 - helped for over a year   Lumbar Medial branch blocks     Pain medications include:  Cymbalta   Gabapentin  Hydrocodone-acetaminophen 7.5-325mg  Methocarbamol  Tizanidine  Ibuprofen - helps some     Previously: Flexeril, Tramadol, compounded pain cream (temporary relief), CBD topicals    Other conservative modalities which the patient reports using include:  Physical Therapy: no, but did have for right shoulder   Chiropractor: no  Massage Therapy: no  TENS: yes  Neck or back surgery: no  Past pain management: yes  Heat  Ice     Past Significant Surgical History:  None     HPI:       CHIEF COMPLAINT: Neck Pain    Gabi Tomas is a 62 y.o. female referred here by Jona Garcia MD. Gabi Tomas presents to the office for evaluation and treatment of Neck Pain      History of Present Illness  Onset:  Many years ago   Inciting Event:  Lots of physical work   Location:  Neck, back, hand, foot  Pain: Pain described as ache, pressure and cutting. Located in the areas above and does not radiate.  Severity:  Pain rated as a 5 /10.  Apportions pain as almost 100%  Neck/back pain without radicular symptoms.  Symptoms have been episodic.  Exacerbation:  Weather, standing for more than 5 minutes, bending/coming back up.   Alleviation:  Ablation helped.  Associated Symptoms:   She denies any new onset of bowel or bladder weakness, significant leg weakness or saddle anesthesia. She has urinary urgency and frequency in the middle of the night.   Ambulates: With a single point cane  Limitations: This pain limits the patient from standing for more than 5  minutes  Goals: Functional goals include ability to do the above.      Patient c/o back, neck, hand and foot pain- that she has had for many years. She describes the pain as cutting in her hands and toes, aching in her low back, and  Pressure in her neck. She c/o migraines that she has daily. She has had PT 3 months ago for her right shoulder which did improve her pain. She found an old prescription of hydrocodone which she is currently taking.  She suffers from chronic constipation and sometimes goes 3 weeks without a bowel movement. She also has significant acid reflux.        PEG Assessment   What number best describes your pain on average in the past week?7  What number best describes how, during the past week, pain has interfered with your enjoyment of life?10  What number best describes how, during the past week, pain has interfered with your general activity?  10        Current Outpatient Medications:   •  ALPRAZolam (XANAX) 0.5 MG tablet, TK 1 T PO  BID PRN, Disp: , Rfl: 5  •  cyanocobalamin ([employer] Vitamin B-12) 1000 MCG tablet, Take 1 tablet by mouth Daily., Disp: 30 tablet, Rfl: 5  •  DULoxetine (CYMBALTA) 60 MG capsule, TAKE ONE CAPSULE BY MOUTH TWICE DAILY, Disp: 180 capsule, Rfl: 0  •  famotidine (PEPCID) 20 MG tablet, Take 20 mg by mouth Daily., Disp: , Rfl:   •  fexofenadine (ALLEGRA) 180 MG tablet, Take 1 tablet by mouth Daily., Disp: 30 tablet, Rfl: 12  •  fluticasone (FLONASE) 50 MCG/ACT nasal spray, 2 sprays into each nostril Daily., Disp: , Rfl:   •  gabapentin (NEURONTIN) 800 MG tablet, Take 800 mg by mouth 3 (Three) Times a Day., Disp: , Rfl:   •  HYDROcodone-acetaminophen (NORCO) 7.5-325 MG per tablet, Take 1 tablet by mouth Daily As Needed for Moderate Pain ., Disp: 30 tablet, Rfl: 0  •  methocarbamol (ROBAXIN) 500 MG tablet, Take 1 tablet by mouth 3 (Three) Times a Day As Needed., Disp: , Rfl:   •  montelukast (SINGULAIR) 10 MG tablet, Take 10 mg by mouth., Disp: , Rfl:   •  ondansetron ODT  (ZOFRAN-ODT) 4 MG disintegrating tablet, , Disp: , Rfl:   •  rosuvastatin (CRESTOR) 20 MG tablet, Take 1 tablet by mouth Daily., Disp: , Rfl:   •  vitamin D (ERGOCALCIFEROL) 1.25 MG (71818 UT) capsule capsule, Take 50,000 Units by mouth 1 (One) Time Per Week., Disp: , Rfl:   •  tiZANidine (ZANAFLEX) 4 MG tablet, Take 1 tablet by mouth Every 6 (Six) Hours As Needed., Disp: , Rfl:     The following portions of the patient's history were reviewed and updated as appropriate: allergies, current medications, past family history, past medical history, past social history, past surgical history and problem list.      REVIEW OF PERTINENT MEDICAL DATA    1/18/17 MRI CERVICAL SPINE WO IVCON ([place])  Order: [id number]  Impression    IMPRESSION:     MILD SPONDYLOSIS IN THE MID CERVICAL SPINE AS DESCRIBED, WORST AT THE   C5-C6 LEVEL WITH MILD CANAL NARROWING.  NO ABNORMAL CORD SIGNAL.           : ADONIS     Transcribe Date/Time: Jan 18 2017  1:00P     Dictated by : BASSEM MADERA MD     This examination was interpreted and the report reviewed and   electronically signed by:   BASSEM MADERA MD on Jan 18 2017  1:02PM  EST  Narrative    * * *Final Report* * *     DATE OF EXAM: Jan 18 2017 12:58PM       QBM   0297  -  MRI CERVICAL SPINE WO IVCON  / ACCESSION #  [id number]     PROCEDURE REASON: multiple diagnoses          * * * * Physician Interpretation * * * *     RESULT: MRI CERVICAL SPINE WO IVCON     HISTORY:  Pain, unspecified Unspecified abnormalities of gait and mobility     COMPARISON: None.     TECHNIQUE: Routine cervical spine protocol without gadolinium.     CERVICAL RESULT:     Counting reference:  Craniocervical junction.     Alignment:  Reversal of normal cervical lordosis, likely positioning and   degenerative.  Alignment is anatomic.     Craniocervical junction:    Craniocervical junction is normal.     Cord:  The visualized cord is within normal limits of signal intensity   and morphology.     Bone marrow  "signal/fracture:    No evidence of pathologic marrow   infiltration.  No evidence of prior fracture.     Cervical soft tissues:    The paraspinal soft tissues planes are   maintained.     C2-C3: Canal and foramina are patent.     C3-C4: Canal and foramina are patent.     C4-C5: Canal and foramina are patent.     C5-C6: There is disc degeneration with disc osteophyte complex,   uncovertebral spurring, and facet arthropathy causing mild canal   narrowing and mild right foraminal stenosis.     C6-C7: Canal and foramina are patent.  Disc degeneration with disc   osteophyte complex noted.     C7-T1: Canal and foramina are patent.  Exam End: --    Specimen Collected: 01/18/17 12:58 Last Resulted:         12/10/21 Creatinine 0.79, Platelets 248 (10*3)    Review of Systems   Constitutional: Positive for chills and fatigue. Negative for activity change and fever.   HENT: Positive for congestion.    Eyes: Negative for visual disturbance.   Respiratory: Negative for chest tightness and shortness of breath.    Cardiovascular: Positive for chest pain and palpitations.   Gastrointestinal: Positive for constipation. Negative for abdominal pain and diarrhea.   Genitourinary: Positive for frequency and urgency. Negative for difficulty urinating, dyspareunia and dysuria.   Musculoskeletal: Positive for back pain and neck pain.   Neurological: Positive for dizziness, weakness, light-headedness, numbness (fingers and toes) and headaches.   Psychiatric/Behavioral: Positive for agitation and sleep disturbance. Negative for self-injury and suicidal ideas. The patient is nervous/anxious.      I have reviewed and confirmed the accuracy of the ROS as documented by the MA/LPN/RN Rosie Cuevas MD      Vitals:    10/11/22 1330   BP: 115/75   BP Location: Left arm   Patient Position: Sitting   Cuff Size: Large Adult   Pulse: 101   Temp: 97.7 °F (36.5 °C)   SpO2: 99%   Weight: 102 kg (224 lb)   Height: 160 cm (63\")   PainSc:   5   PainLoc: " Finger         Objective   Physical Exam  Vitals reviewed.   Constitutional:       General: She is not in acute distress.  Pulmonary:      Effort: Pulmonary effort is normal. No respiratory distress.   Musculoskeletal:      Comments: Ambulation: With a single point cane  Lumbar Exam:  Appearance: Scoliotic curve absent and scarring absent  Palpated over lumbosacral paravertebral regions and transverse processes with positive tenderness appreciated, Bilateral.   Sacroiliac joints are tender, Bilateral.  Facet loading is positive for pain, Bilateral.    Skin:     General: Skin is warm and dry.   Neurological:      General: No focal deficit present.      Mental Status: She is alert.   Psychiatric:         Mood and Affect: Mood normal.         Thought Content: Thought content normal.         Assessment & Plan   Diagnoses and all orders for this visit:    1. Chronic neck pain (Primary)  -     XR Spine Cervical 2 or 3 View; Future  -     Ambulatory Referral to Physical Therapy Evaluate and treat    2. Chronic bilateral low back pain without sciatica  -     XR Spine Lumbar 2 or 3 View; Future  -     Case Request  -     Ambulatory Referral to Physical Therapy Evaluate and treat        - Pertinent labs reviewed.   - Pertinent imaging reviewed (although several years old).   - X-rays of cervical and lumbar spine ordered today.   - Physical therapy referral placed to [facility] (per her request).   - Will plan for diagnostic lumbar Medial branch blocks at Bilateral ~L3-S1 since she had good results from Radiofrequency ablation (thermal, non-pulsed) in the past.  Risks discussed including but not limited to bleeding, bruising, infection, damage to surrounding structures, headache, and rare things such as being paralyzed, seizure, stroke, heart attack and death.  - Explained Cymbalta and gabapentin are the best options available for peripheral neuropathy that she describes (this is being treated by her  neurologist).   - Recommendations outside of those medications include a muscle relaxant.  I recommend avoiding NSAIDs due to her reflux complaints and avoiding opioids due to her constipation complaints.  Explained these factors to her.   - Gabi Tomas reports a pain score of 5.  Given her pain assessment as noted, treatment options were discussed and the following options were decided upon as a follow-up plan to address the patient's pain: Injections.  - Gabi Tomas  reports that she has been smoking cigarettes. She has a 10.50 pack-year smoking history. She has never used smokeless tobacco.. The risk of diseases from using tobacco products include cancer, COPD and heart disease.     --- Follow-up for bilateral ~L3-S1 Medial branch blocks and office visit 1-2 weeks after.            While examining this patient, I wore protective equipment including a mask, eye shield and gloves.  I washed my hands before and after this patient encounter.  The patient wore a mask throughout the visit as well.     Rosie Cuevas MD  Pain Management

## 2022-10-11 ENCOUNTER — OFFICE VISIT (OUTPATIENT)
Dept: PAIN MEDICINE | Facility: CLINIC | Age: 62
End: 2022-10-11

## 2022-10-11 ENCOUNTER — HOSPITAL ENCOUNTER (OUTPATIENT)
Dept: GENERAL RADIOLOGY | Facility: HOSPITAL | Age: 62
Discharge: HOME | End: 2022-10-11

## 2022-10-11 ENCOUNTER — PREP FOR SURGERY (OUTPATIENT)
Dept: SURGERY | Facility: SURGERY CENTER | Age: 62
End: 2022-10-11

## 2022-10-11 VITALS
WEIGHT: 224 LBS | SYSTOLIC BLOOD PRESSURE: 115 MMHG | HEART RATE: 101 BPM | DIASTOLIC BLOOD PRESSURE: 75 MMHG | HEIGHT: 63 IN | TEMPERATURE: 97.7 F | OXYGEN SATURATION: 99 % | BODY MASS INDEX: 39.69 KG/M2

## 2022-10-11 DIAGNOSIS — M54.50 CHRONIC BILATERAL LOW BACK PAIN WITHOUT SCIATICA: Primary | ICD-10-CM

## 2022-10-11 DIAGNOSIS — M54.2 CHRONIC NECK PAIN: ICD-10-CM

## 2022-10-11 DIAGNOSIS — G89.29 CHRONIC NECK PAIN: Primary | ICD-10-CM

## 2022-10-11 DIAGNOSIS — M54.50 CHRONIC BILATERAL LOW BACK PAIN WITHOUT SCIATICA: ICD-10-CM

## 2022-10-11 DIAGNOSIS — G89.29 CHRONIC BILATERAL LOW BACK PAIN WITHOUT SCIATICA: ICD-10-CM

## 2022-10-11 DIAGNOSIS — G89.29 CHRONIC NECK PAIN: ICD-10-CM

## 2022-10-11 DIAGNOSIS — M54.2 CHRONIC NECK PAIN: Primary | ICD-10-CM

## 2022-10-11 DIAGNOSIS — G89.29 CHRONIC BILATERAL LOW BACK PAIN WITHOUT SCIATICA: Primary | ICD-10-CM

## 2022-10-11 PROCEDURE — 72100 X-RAY EXAM L-S SPINE 2/3 VWS: CPT

## 2022-10-11 PROCEDURE — 99204 OFFICE O/P NEW MOD 45 MIN: CPT | Performed by: ANESTHESIOLOGY

## 2022-10-11 PROCEDURE — 72040 X-RAY EXAM NECK SPINE 2-3 VW: CPT

## 2022-10-11 RX ORDER — SODIUM CHLORIDE 0.9 % (FLUSH) 0.9 %
10 SYRINGE (ML) INJECTION EVERY 12 HOURS SCHEDULED
Status: CANCELLED | OUTPATIENT
Start: 2022-10-11

## 2022-10-11 RX ORDER — SODIUM CHLORIDE 0.9 % (FLUSH) 0.9 %
10 SYRINGE (ML) INJECTION AS NEEDED
Status: CANCELLED | OUTPATIENT
Start: 2022-10-11

## 2022-10-11 NOTE — PATIENT INSTRUCTIONS
"Obtain x-rays of neck and back today.     What to expect if we're setting up an injection/procedure    - I have placed the order today, we'll start speaking to your insurance for authorization (this can sometimes take a few weeks).   - You should be scheduled for your procedure before you leave the office.  If you were not, please call our office to schedule.   - If you have any symptoms of an infection or of COVID, please reschedule your procedure.   - LIGHT Intravenous (IV) sedation is offered for some procedures: you will NOT be put to sleep.  If you plan to have sedation, do not eat or drink anything on the day of your injection.   - Most procedures require having someone drive you.  Please make sure you arrange a  unless told otherwise.   - If you take a blood thinner and you were not instructed whether to continue or hold it, please contact us with any questions.     Radiofrequency ablation (RFA):     - Radiofrequency ablation is a term used to describe cauterization or \"burning.\"   - In pain management, we can use this technique with a special needle to target and destroy areas that are causing your pain.   - In most cases, you must have TWO successful \"test injections\" before you are a candidate for RFA.    After your RFA:   - Because heat is used in this technique, it is common to have soreness after the procedure.  Sometimes \"neuritis\" occurs, which feels like tingling, prickly, or sunburn under the skin sensations.   - Ice packs are helpful in decreasing this soreness and preventing post-procedure \"neuritis\" pain.  Use an ice pack for 20 minutes at a time at least 3 times the day of and the day after your procedure.   - It is common to have arm/leg numbness or weakness the day of your procedure, but this should wear off by the following day.   - It may take up to 6 weeks to gain full benefit from this procedure.      Ask your GI doctor about Ibuprofen versus Celebrex - these could help your pain, " but could worsen your reflux.

## 2022-10-12 ENCOUNTER — TRANSCRIBE ORDERS (OUTPATIENT)
Dept: SURGERY | Facility: SURGERY CENTER | Age: 62
End: 2022-10-12

## 2022-10-12 DIAGNOSIS — Z41.9 SURGERY, ELECTIVE: Primary | ICD-10-CM

## 2022-11-02 NOTE — DISCHARGE INSTRUCTIONS
[facility] Pain Management - Post-procedure Instructions          --  While there are no absolute restrictions, it is recommended that you do not perform strenuous activity today. In the morning, you may resume your level of activity as before your block.    --  If you have a band-aid at your injection site, please remove it later today. Observe the area for any redness, swelling, pus-like drainage, or a temperature over 101°. If any of these symptoms occur, please call your doctor at [phone]. If after office hours, leave a message and the on-call provider will return your call.    --  Ice may be applied to your injection site. It is recommended you avoid direct heat (heating pad; hot tub) for 1-2 days.    --  Call [facility]-Pain Management at [phone] if you experience persistent headache, persistent bleeding from the injection site, or severe pain not relieved by heat or oral medication.    --  Do not make important decisions today.    --  Due to the effects of the block and/or the I.V. Sedation, DO NOT drive or operate hazardous machinery for 12 hours.  Local anesthetics may cause numbness after procedure and precautions must be taken with regards to operating equipment as well as with walking, even if ambulating with assistance of another person or with an assistive device.    --  Do not drink alcohol for 12 hours.    -- You may return to work tomorrow, or as directed by your referring doctor.    --  Occasionally you may notice a slight increase in your pain after the procedure. This should start to improve within the next 24-48 hours. Radiofrequency ablation procedure pain may last 3-4 weeks.    --  It may take as long as 3-4 days before you notice a gradual improvement in your pain and/or other symptoms.    -- You may continue to take your prescribed pain medication as needed.    --  Some normal possible side effects of steroid use could include fluid retention, increased blood sugar, dull headache,  increased sweating, increased appetite, mood swings and flushing.    --  Diabetics are recommended to watch their blood glucose level closely for 24-48 hours after the injection.    --  Must stay in PACU for 20 min upon arrival and prove no leg weakness before being discharged.    --  IN THE EVENT OF A LIFE THREATENING EMERGENCY, (CHEST PAIN, BREATHING DIFFICULTIES, PARALYSIS…) YOU SHOULD GO TO YOUR NEAREST EMERGENCY ROOM.    --  You should be contacted by our office within 2-3 days to schedule follow up or next appointment date.  If not contacted within 7 days, please call the office at [phone]      If you have even 1 hour of relief, these injections are considered successful.

## 2022-11-03 NOTE — SIGNIFICANT NOTE
Patient educated on the following :    - If you are receiving Sedation for your procedure Nothing to Eat 6 hours and only clear liquids for 2 hours prior to your procedure.    -You will need to have someone drive you home after your PROCEDURE and remain with you for 24 hours after the PROCEDURE  - The date of your procedure, your are welcome to have one visitor at bedside or remain within 10-15 minutes of [facility]  -You will need to arrive at 0945 on 11/7 for your PROCEDURE  -Please contact [employer] [facility] at: [phone] with any questions and/or concerns

## 2022-11-07 ENCOUNTER — HOSPITAL ENCOUNTER (OUTPATIENT)
Facility: SURGERY CENTER | Age: 62
Setting detail: HOSPITAL OUTPATIENT SURGERY
Discharge: HOME | End: 2022-11-07
Attending: ANESTHESIOLOGY | Admitting: ANESTHESIOLOGY

## 2022-11-07 ENCOUNTER — HOSPITAL ENCOUNTER (OUTPATIENT)
Dept: GENERAL RADIOLOGY | Facility: SURGERY CENTER | Age: 62
Setting detail: HOSPITAL OUTPATIENT SURGERY
End: 2022-11-07

## 2022-11-07 VITALS
DIASTOLIC BLOOD PRESSURE: 77 MMHG | WEIGHT: 224 LBS | TEMPERATURE: 97.8 F | HEART RATE: 77 BPM | OXYGEN SATURATION: 97 % | BODY MASS INDEX: 39.69 KG/M2 | SYSTOLIC BLOOD PRESSURE: 122 MMHG | HEIGHT: 63 IN | RESPIRATION RATE: 18 BRPM

## 2022-11-07 DIAGNOSIS — Z41.9 SURGERY, ELECTIVE: ICD-10-CM

## 2022-11-07 DIAGNOSIS — G89.29 CHRONIC BILATERAL LOW BACK PAIN WITHOUT SCIATICA: ICD-10-CM

## 2022-11-07 DIAGNOSIS — M54.50 CHRONIC BILATERAL LOW BACK PAIN WITHOUT SCIATICA: ICD-10-CM

## 2022-11-07 PROCEDURE — 77002 NEEDLE LOCALIZATION BY XRAY: CPT

## 2022-11-07 PROCEDURE — 76000 FLUOROSCOPY <1 HR PHYS/QHP: CPT

## 2022-11-07 PROCEDURE — 64494 INJ PARAVERT F JNT L/S 2 LEV: CPT | Performed by: ANESTHESIOLOGY

## 2022-11-07 PROCEDURE — 64495 INJ PARAVERT F JNT L/S 3 LEV: CPT | Performed by: ANESTHESIOLOGY

## 2022-11-07 PROCEDURE — 64493 INJ PARAVERT F JNT L/S 1 LEV: CPT | Performed by: ANESTHESIOLOGY

## 2022-11-07 RX ORDER — SODIUM CHLORIDE 0.9 % (FLUSH) 0.9 %
10 SYRINGE (ML) INJECTION AS NEEDED
Status: DISCONTINUED | OUTPATIENT
Start: 2022-11-07 | End: 2022-11-07 | Stop reason: HOSPADM

## 2022-11-07 RX ORDER — SODIUM CHLORIDE 0.9 % (FLUSH) 0.9 %
10 SYRINGE (ML) INJECTION EVERY 12 HOURS SCHEDULED
Status: DISCONTINUED | OUTPATIENT
Start: 2022-11-07 | End: 2022-11-07 | Stop reason: HOSPADM

## 2022-11-07 NOTE — OP NOTE
Lumbar Medial Branch Blockade at  Bilateral L3-S1  [facility]      PREOPERATIVE DIAGNOSIS:  Lumbar spondylosis without myelopathy    POSTOPERATIVE DIAGNOSIS:  Lumbar spondylosis without myelopathy    PROCEDURE:   Diagnostic Lumbar Medial Branch Nerve Blockades, with fluoroscopy:  at the L3, L4, L5 transverse processes and the sacral alar groove)   1. 10622-05 -- Lumbar Facet blocks, 1st Level  2. 71040-34 -- Lumbar Facet blocks, 2nd  Level  3. 12513-24 -- Lumbar Facet blocks, 3rd Level     PRE-PROCEDURE DISCUSSION WITH PATIENT:    Risks and complications were discussed with the patient prior to starting the procedure and informed consent was obtained.      SURGEON:  Rosie Cuevas MD    REASON FOR PROCEDURE:    The patient complains of pain that seems to have a significant axial component    SEDATION:  Patient declined administration of moderate sedation    ANESTHETIC:  0.5% bupivacaine  STEROID:  None  TOTAL VOLUME OF SOLUTION:  8ml    DESCRIPTON OF PROCEDURE:  After obtaining informed consent, IV access was not obtained in the preoperative area.   The patient was taken to the operating room.  The patient was placed in the prone position with a pillow under the abdomen. All pressure points were well padded.  EKG, blood pressure, and pulse oximeter were monitored.  The patient was monitored and sedated by the RN under my direction. The lumbosacral area was prepped with Chloraprep and draped in a sterile fashion.     AP fluoroscopic image was used to visualize the junction of the right S1 superior articular process with the sacral ala.  The skin and subcutaneous tissue over the area was anesthetized with 1% lidocaine.  A 22-gauge spinal needle was then advanced percutaneously through the anesthetized skin tract under fluoroscopic guidance in a coaxial view to contact periosteum.  After negative aspiration, a volume of 1 mL of the local anesthetic was injected without resistance.  The image was  then optimized to maximize visualization of the junctions of the L3, L4, L5 superior articular processes with the transverse processes.  The skin and subcutaneous tissue over the areas was anesthetized with 1% lidocaine.  A 22-gauge spinal needle was then advanced percutaneously through the anesthetized skin tracts under fluoroscopic guidance in a coaxial view to contact periosteum at the target sites.  After negative aspiration, a volume of 0.75 mL of the local anesthetic  was injected without resistance at each of the target sites.      The same procedure was then performed on the contralateral side in the exact same fashion.        Onset of analgesia was noted.  Vital signs remained stable throughout.      ESTIMATED BLOOD LOSS:  <5 mL  SPECIMENS:  none    COMPLICATIONS:   No complications were noted.    TOLERANCE & DISCHARGE CONDITION:    The patient tolerated the procedure well.  The patient was transported to the recovery area without difficulties.  The patient was discharged to home under the care of family in stable and satisfactory condition.    Pre-procedure pain score: 5/10 at rest, 10/10 with activity  Post-procedure pain score: 1/10    PLAN OF CARE:  1. The patient was given our standard instruction sheet.  2. We discussed that Lumbar Medial Branch Blockade is a diagnostic procedure in consideration for radiofrequency ablation if two diagnostic procedures prove to be positive for significant benefit.  An alternative plan could be therapeutic lumbar branch blockades.  3. The patient is asked to keep an account of pain relief after the procedure today.  4. The patient will  Return to clinic 1-2 wks.  5. The patient will resume all medications as per the medication reconciliation sheet.

## 2022-11-18 ENCOUNTER — PREP FOR SURGERY (OUTPATIENT)
Dept: SURGERY | Facility: SURGERY CENTER | Age: 62
End: 2022-11-18

## 2022-11-18 ENCOUNTER — OFFICE VISIT (OUTPATIENT)
Dept: PAIN MEDICINE | Facility: CLINIC | Age: 62
End: 2022-11-18

## 2022-11-18 VITALS
WEIGHT: 223 LBS | TEMPERATURE: 97.8 F | BODY MASS INDEX: 39.51 KG/M2 | HEIGHT: 63 IN | OXYGEN SATURATION: 98 % | DIASTOLIC BLOOD PRESSURE: 80 MMHG | SYSTOLIC BLOOD PRESSURE: 124 MMHG

## 2022-11-18 DIAGNOSIS — M47.816 LUMBAR SPONDYLOSIS: Primary | ICD-10-CM

## 2022-11-18 DIAGNOSIS — M47.817 LUMBOSACRAL SPONDYLOSIS WITHOUT MYELOPATHY: Primary | ICD-10-CM

## 2022-11-18 PROCEDURE — 99214 OFFICE O/P EST MOD 30 MIN: CPT | Performed by: PHYSICIAN ASSISTANT

## 2022-11-18 RX ORDER — SODIUM CHLORIDE 0.9 % (FLUSH) 0.9 %
10 SYRINGE (ML) INJECTION AS NEEDED
Status: CANCELLED | OUTPATIENT
Start: 2022-11-18

## 2022-11-18 RX ORDER — SODIUM CHLORIDE 0.9 % (FLUSH) 0.9 %
10 SYRINGE (ML) INJECTION EVERY 12 HOURS SCHEDULED
Status: CANCELLED | OUTPATIENT
Start: 2022-11-18

## 2022-11-18 NOTE — PROGRESS NOTES
CHIEF COMPLAINT  F/U back pain-LUMBAR MEDIAL BRANCH BLOCK Bilateral ~L3-S1- patient states that she had 50% improvement for 5 days.        Subjective   Gabi Tomas is a 62 y.o. female  who presents to the office for follow-up of procedure.  She completed a #1 diagnostic bilateral L3-S1 MBB   on 11/7/2022 performed by Dr. Cuevas for management of low back pain. Patient reports 50-80% relief from the procedure x5 days with 50% improvement of pain ongoing as of today.  Patient continues with pain in a transverse distribution across lumbosacral spine which is predominantly axial in nature without lower extremity radicular symptoms at this time.    Pain today 4/10 VAS in severity.      Back Pain  This is a chronic problem. The current episode started more than 1 year ago. The problem occurs constantly. The problem has been gradually improving since onset. The pain is present in the lumbar spine. The quality of the pain is described as aching. The pain is at a severity of 4/10. The pain is mild. The pain is the same all the time. The symptoms are aggravated by bending, position and twisting. Associated symptoms include headaches, numbness and weakness. Pertinent negatives include no abdominal pain, chest pain, dysuria or fever.        PEG Assessment   What number best describes your pain on average in the past week?4  What number best describes how, during the past week, pain has interfered with your enjoyment of life?4  What number best describes how, during the past week, pain has interfered with your general activity?  4    Review of Pertinent Medical Data ---  PROCEDURE:  XR SPINE LUMBAR 2 OR 3 VW-, XR SPINE CERVICAL 2 OR 3 VW-       HISTORY: Chronic neck and back pain.       COMPARISON: CT abdomen and pelvis 4/26/2019       FINDINGS:         3 views of the cervical spine were obtained. The cervical spine is   visualized from C2 through C6 on the lateral view. There is   straightening of the normal cervical  lordosis. There is trace   retrolisthesis of C5 on C6. Vertebral body heights are maintained. There   is moderate disc height loss at C5-6, where there are tiny degenerative   endplate osteophytes. There is mild uncovertebral hypertrophy.       3 views of the lumbar spine were obtained. There is a normal lumbar   lordosis. There is trace retrolisthesis of L2 on L3 and L3 on L4.   Vertebral body heights are maintained. There is at least moderate disc   height loss at L2-3 with lesser degrees of disc height loss at   additional levels. There are small degenerative endplate osteophytes at   L2-3. There is multilevel facet osteoarthropathy. There are surgical   clips in the right upper quadrant.       This report was finalized on 10/12/2022 9:54 AM by Dr. Aye Alvarez M.D.     The following portions of the patient's history were reviewed and updated as appropriate: allergies, current medications, past family history, past medical history, past social history, past surgical history and problem list.    Review of Systems   Constitutional: Positive for fatigue. Negative for activity change (increased), chills and fever.   HENT: Positive for congestion.    Eyes: Negative for visual disturbance.   Respiratory: Positive for shortness of breath. Negative for chest tightness.    Cardiovascular: Negative for chest pain.   Gastrointestinal: Positive for diarrhea. Negative for abdominal pain and constipation.   Genitourinary: Negative for difficulty urinating, dyspareunia and dysuria.   Musculoskeletal: Positive for back pain.   Neurological: Positive for dizziness, weakness, light-headedness, numbness and headaches.   Psychiatric/Behavioral: Positive for sleep disturbance. Negative for agitation. The patient is nervous/anxious.      I have reviewed and confirmed the accuracy of the ROS as documented by the MA/LPN/INDER Miranda     Vitals:    11/18/22 1346   BP: 124/80   Temp: 97.8 °F (36.6 °C)   SpO2: 98%   Weight: 101  "kg (223 lb)   Height: 160 cm (63\")   PainSc:   4   PainLoc: Back         Objective   Physical Exam  Vitals and nursing note reviewed.   Constitutional:       Appearance: Normal appearance.   HENT:      Head: Normocephalic.   Pulmonary:      Effort: Pulmonary effort is normal.   Musculoskeletal:      Lumbar back: Spasms and tenderness (Undernourished to palpation within the overlying bilateral lumbar facet joint spaces) present. Decreased range of motion.      Comments: Pain is increased with lumbar extension and lateral bending   Skin:     General: Skin is warm and dry.   Neurological:      General: No focal deficit present.      Mental Status: She is alert and oriented to person, place, and time.      Cranial Nerves: Cranial nerves 2-12 are intact.      Sensory: Sensation is intact.      Motor: Tremor present.      Gait: Gait abnormal (Ambulates with a cane).   Psychiatric:         Mood and Affect: Mood normal.         Behavior: Behavior normal.         Thought Content: Thought content normal.         Judgment: Judgment normal.         Assessment & Plan   Diagnoses and all orders for this visit:    1. Lumbosacral spondylosis without myelopathy (Primary)        --- I will have the patient return for #2/confirmatory bilateral L3-S1 MBB with plan to progress to RFA if she continues with favorable improvement  --- RTC 1-2 weeks after injective therapy            Dictated utilizing Dragon dictation.      Patient remained masked during entire encounter. No cough present. I donned a mask and eye protection throughout entire visit. Prior to donning mask and eye protection, hand hygiene was performed, as well as when it was doffed.  I was closer than 6 feet, but not for an extended period of time. No obvious exposure to any bodily fluids.  "

## 2022-11-21 ENCOUNTER — DOCUMENTATION (OUTPATIENT)
Dept: PAIN MEDICINE | Facility: CLINIC | Age: 62
End: 2022-11-21

## 2022-11-21 NOTE — PROGRESS NOTES
Per Dr Cuevas:  please let patient know that versed and fentanyl are the only options for sedation for her procedure.  We do not use fentanyl for MBBs--I can send her in a prescription for valium that she would need to  from her pharmacy before the procedure and can use that to help decrease her anxiety regarding the injection.

## 2022-11-22 ENCOUNTER — TRANSCRIBE ORDERS (OUTPATIENT)
Dept: SURGERY | Facility: SURGERY CENTER | Age: 62
End: 2022-11-22

## 2022-11-22 DIAGNOSIS — Z41.9 SURGERY, ELECTIVE: Primary | ICD-10-CM

## 2022-12-13 ENCOUNTER — TELEPHONE (OUTPATIENT)
Dept: NEUROLOGY | Facility: CLINIC | Age: 62
End: 2022-12-13

## 2022-12-13 RX ORDER — ACETAMINOPHEN, ASPIRIN AND CAFFEINE 250; 250; 65 MG/1; MG/1; MG/1
1 TABLET, FILM COATED ORAL EVERY 6 HOURS PRN
COMMUNITY
End: 2023-02-13

## 2022-12-13 NOTE — SIGNIFICANT NOTE
Patient educated on the following :    - If you are receiving Sedation for your procedure Nothing to Eat 6 hours and only clear liquids for 2 hours prior to your procedure.    -You will need to have someone drive you home after your PROCEDURE and remain with you for 24 hours after the PROCEDURE  - The date of your procedure, your are welcome to have one visitor at bedside or remain within 10-15 minutes of [facility]  -You will need to arrive at 0730 on 10/16 for your PROCEDURE  -Please contact [facility] at: [phone] with any questions and/or concerns

## 2022-12-14 ENCOUNTER — TELEPHONE (OUTPATIENT)
Dept: PAIN MEDICINE | Facility: CLINIC | Age: 62
End: 2022-12-14

## 2022-12-14 ENCOUNTER — HOSPITAL ENCOUNTER (OUTPATIENT)
Dept: INFUSION THERAPY | Facility: HOSPITAL | Age: 62
Discharge: HOME | End: 2022-12-14
Admitting: PSYCHIATRY & NEUROLOGY

## 2022-12-14 DIAGNOSIS — R20.2 PARESTHESIAS: ICD-10-CM

## 2022-12-14 DIAGNOSIS — Z86.69 HISTORY OF NEUROPATHY: ICD-10-CM

## 2022-12-14 PROCEDURE — 95909 NRV CNDJ TST 5-6 STUDIES: CPT

## 2022-12-14 PROCEDURE — 95886 MUSC TEST DONE W/N TEST COMP: CPT

## 2022-12-14 PROCEDURE — 95886 MUSC TEST DONE W/N TEST COMP: CPT | Performed by: PSYCHIATRY & NEUROLOGY

## 2022-12-14 PROCEDURE — 95909 NRV CNDJ TST 5-6 STUDIES: CPT | Performed by: PSYCHIATRY & NEUROLOGY

## 2022-12-14 NOTE — TELEPHONE ENCOUNTER
Can you please make sure no red-flag symptoms?  Also, she will need to determine if she has relief after this procedure from her chronic pain.  Will she be able to do that with what's going on?

## 2022-12-14 NOTE — TELEPHONE ENCOUNTER
Patient denies any red flag symptoms. She states that she will be able to differentiate and would still like to proceed with the procedure.

## 2022-12-14 NOTE — PROCEDURES
EMG and Nerve Conduction Studies    I.      Instrument used: Neuromax 1002  II.     Please see data sheets for tabular summary of NCS and details on methods, temperatures and lab standards.   III.    EMG muscles tested for upper extremity studies include the deltoid, biceps, triceps, pronator teres, extensor digitorum communis, first dorsal interosseous and abductor pollicis brevis.    IV.   EMG muscles tested for lower extremity studies include the vastus lateralis, tibialis anterior, peroneus longus, medial gastrocnemius and extensor digitorum brevis.    V.    Additional muscles tested as needed.  Paraspinal muscles tested as needed.   VI.   Please see data sheets for tabular summary of EMG findings.   VII. The complete report includes the data sheets.      Indication: Numbness tingling and pain in the feet as well as hands.  History: 62-year-old white female with longstanding numbness tingling and pain in the feet as well as in the hands.  She has episodic low back pain currently with a acute flareup.  States that she has had the nerves burned once and is scheduled for radiation.  She denies a history of diabetes or thyroid disease.  Previous extensive evaluation for neuropathy included a skin biopsy which she states was negative ([facility])      Ht: 160 cm  Wt: 101 kg; BMI 39.50  HbA1C: No results found for: HGBA1C  TSH:   Lab Results   Component Value Date    TSH 4.200 06/13/2017       Technical summary:  Nerve conduction studies were obtained in the left leg with 1 comparison on the right. The feet were extremely cold and difficult to warm.  In particular the sural sensory study was repeated after rewarming the foot.  Needle examination was obtained on selected muscles in both legs.    Results:  1.  Prolonged left sural sensory distal latency at 4.3 ms with relatively high amplitude.  (This was the second trial after rewarming the foot maintaining temperature above 32 °C along the course of the sural  nerve in the field of it being tested)  2.  Normal superficial peroneal sensory distal latencies and amplitudes bilaterally.  3.  Normal left peroneal motor conduction velocities, distal latency and amplitudes.  4.  Normal left tibial motor conduction velocity, distal latency and amplitude.  5.  Needle examination of selected muscles of both legs showed normal insertional activities throughout.  There were normal motor units and recruitment patterns throughout other than some of the recruitment patterns were a little reduced with complaint of pain.    Impression:  Mildly abnormal study with a mildly prolonged left sural sensory latency without any other features of polyneuropathy.  There was no evidence of a lumbosacral radiculopathy on either side by this study.  Study results were discussed with the patient.    Isra Lewis M.D.              Dictated utilizing Dragon dictation.

## 2022-12-14 NOTE — TELEPHONE ENCOUNTER
----- Message from Bronson Mix sent at 12/14/2022 11:23 AM EST -----  Regarding: PAIN IN BUTT AND HIP  Patient is scheduled Friday morning for her MBB and she is stating that she is having extreme pain butt cheek and hip (MARILYN). Wants to know if it is okay for her to go ahead and have the procedure any ways. She stated it is hard for her to walk but she is in extreme pain and still wants to go through with the procedure, but wanted to check and make sure that you would still be able to do the procedure.    She is going to have a EMG at 1:30 today so if she doesn't answer please leave a message for her as to if the procedure can still happen.

## 2022-12-15 NOTE — DISCHARGE INSTRUCTIONS
[facility] - Post-procedure Instructions          --  While there are no absolute restrictions, it is recommended that you do not perform strenuous activity today. In the morning, you may resume your level of activity as before your block.    --  If you have a band-aid at your injection site, please remove it later today. Observe the area for any redness, swelling, pus-like drainage, or a temperature over 101°. If any of these symptoms occur, please call your doctor at [phone]. If after office hours, leave a message and the on-call provider will return your call.    --  Ice may be applied to your injection site. It is recommended you avoid direct heat (heating pad; hot tub) for 1-2 days.    --  Call [facility] at [phone] if you experience persistent headache, persistent bleeding from the injection site, or severe pain not relieved by heat or oral medication.    --  Do not make important decisions today.    --  Due to the effects of the block and/or the I.V. Sedation, DO NOT drive or operate hazardous machinery for 12 hours.  Local anesthetics may cause numbness after procedure and precautions must be taken with regards to operating equipment as well as with walking, even if ambulating with assistance of another person or with an assistive device.    --  Do not drink alcohol for 12 hours.    -- You may return to work tomorrow, or as directed by your referring doctor.    --  Occasionally you may notice a slight increase in your pain after the procedure. This should start to improve within the next 24-48 hours. Radiofrequency ablation procedure pain may last 3-4 weeks.    --  It may take as long as 3-4 days before you notice a gradual improvement in your pain and/or other symptoms.    -- You may continue to take your prescribed pain medication as needed.    --  Some normal possible side effects of steroid use could include fluid retention, increased blood sugar, dull headache,  increased sweating, increased appetite, mood swings and flushing.    --  Diabetics are recommended to watch their blood glucose level closely for 24-48 hours after the injection.    --  Must stay in PACU for 20 min upon arrival and prove no leg weakness before being discharged.    --  IN THE EVENT OF A LIFE THREATENING EMERGENCY, (CHEST PAIN, BREATHING DIFFICULTIES, PARALYSIS…) YOU SHOULD GO TO YOUR NEAREST EMERGENCY ROOM.    --  You should be contacted by our office within 2-3 days to schedule follow up or next appointment date.  If not contacted within 7 days, please call the office at [phone]

## 2022-12-16 ENCOUNTER — HOSPITAL ENCOUNTER (OUTPATIENT)
Dept: GENERAL RADIOLOGY | Facility: SURGERY CENTER | Age: 62
Setting detail: HOSPITAL OUTPATIENT SURGERY
End: 2022-12-16

## 2022-12-16 ENCOUNTER — HOSPITAL ENCOUNTER (OUTPATIENT)
Facility: SURGERY CENTER | Age: 62
Setting detail: HOSPITAL OUTPATIENT SURGERY
Discharge: HOME | End: 2022-12-16
Attending: ANESTHESIOLOGY | Admitting: ANESTHESIOLOGY

## 2022-12-16 VITALS
TEMPERATURE: 97.3 F | WEIGHT: 225 LBS | DIASTOLIC BLOOD PRESSURE: 77 MMHG | OXYGEN SATURATION: 96 % | HEART RATE: 75 BPM | HEIGHT: 63 IN | BODY MASS INDEX: 39.87 KG/M2 | SYSTOLIC BLOOD PRESSURE: 136 MMHG | RESPIRATION RATE: 16 BRPM

## 2022-12-16 DIAGNOSIS — Z41.9 SURGERY, ELECTIVE: ICD-10-CM

## 2022-12-16 PROCEDURE — 64493 INJ PARAVERT F JNT L/S 1 LEV: CPT | Performed by: ANESTHESIOLOGY

## 2022-12-16 PROCEDURE — 77002 NEEDLE LOCALIZATION BY XRAY: CPT

## 2022-12-16 PROCEDURE — 64495 INJ PARAVERT F JNT L/S 3 LEV: CPT | Performed by: ANESTHESIOLOGY

## 2022-12-16 PROCEDURE — 25010000002 MIDAZOLAM PER 1 MG: Performed by: ANESTHESIOLOGY

## 2022-12-16 PROCEDURE — 76000 FLUOROSCOPY <1 HR PHYS/QHP: CPT

## 2022-12-16 PROCEDURE — 64494 INJ PARAVERT F JNT L/S 2 LEV: CPT | Performed by: ANESTHESIOLOGY

## 2022-12-16 RX ORDER — MIDAZOLAM HYDROCHLORIDE 1 MG/ML
INJECTION INTRAMUSCULAR; INTRAVENOUS AS NEEDED
Status: DISCONTINUED | OUTPATIENT
Start: 2022-12-16 | End: 2022-12-16 | Stop reason: HOSPADM

## 2022-12-16 RX ORDER — SODIUM CHLORIDE 0.9 % (FLUSH) 0.9 %
10 SYRINGE (ML) INJECTION EVERY 12 HOURS SCHEDULED
Status: DISCONTINUED | OUTPATIENT
Start: 2022-12-16 | End: 2022-12-16 | Stop reason: HOSPADM

## 2022-12-16 RX ORDER — SODIUM CHLORIDE 0.9 % (FLUSH) 0.9 %
10 SYRINGE (ML) INJECTION AS NEEDED
Status: DISCONTINUED | OUTPATIENT
Start: 2022-12-16 | End: 2022-12-16 | Stop reason: HOSPADM

## 2022-12-16 NOTE — OP NOTE
Lumbar Medial Branch Blockade at  Bilateral L3-S1  [facility]      PREOPERATIVE DIAGNOSIS:  Lumbar spondylosis without myelopathy    POSTOPERATIVE DIAGNOSIS:  Lumbar spondylosis without myelopathy    PROCEDURE:   Diagnostic Lumbar Medial Branch Nerve Blockades, with fluoroscopy:  at the L3, L4, L5 transverse processes and the sacral alar groove)   1. 85511-93 -- Lumbar Facet blocks, 1st Level  2. 56838-12 -- Lumbar Facet blocks, 2nd  Level  3. 90919-15 -- Lumbar Facet blocks, 3rd Level     PRE-PROCEDURE DISCUSSION WITH PATIENT:    Risks and complications were discussed with the patient prior to starting the procedure and informed consent was obtained.      SURGEON:  Rosie Cuevas MD    REASON FOR PROCEDURE:    The patient complains of pain that seems to have a significant axial component and Previous diagnostic positivity of a Lumbar Medial Branch Blockade at the same levels.  Due to significant spondylosis in more than 2 levels of facet joints, 3 levels will be treated on this patient.      SEDATION:  Versed 2mg IV  ANESTHETIC:  0.75% bupivacaine  STEROID:  None  TOTAL VOLUME OF SOLUTION:  8ml    DESCRIPTON OF PROCEDURE:  After obtaining informed consent, IV access was obtained in the preoperative area.   The patient was taken to the operating room.  The patient was placed in the prone position with a pillow under the abdomen. All pressure points were well padded.  EKG, blood pressure, and pulse oximeter were monitored.  The patient was monitored and sedated by the RN under my direction. The lumbosacral area was prepped with Chloraprep and draped in a sterile fashion.     AP fluoroscopic image was used to visualize the junction of the right S1 superior articular process with the sacral ala.  The skin and subcutaneous tissue over the area was anesthetized with 1% lidocaine.  A 22-gauge spinal needle was then advanced percutaneously through the anesthetized skin tract under fluoroscopic guidance  in a coaxial view to contact periosteum.  After negative aspiration, a volume of 1 mL of the local anesthetic was injected without resistance.  The image was then optimized to maximize visualization of the junctions of the L3, L4, L5 superior articular processes with the transverse processes.  The skin and subcutaneous tissue over the areas was anesthetized with 1% lidocaine.  A 22-gauge spinal needle was then advanced percutaneously through the anesthetized skin tracts under fluoroscopic guidance in a coaxial view to contact periosteum at the target sites.  After negative aspiration, a volume of 1 mL of the local anesthetic  was injected without resistance at each of the target sites.      The same procedure was then performed on the contralateral side in the exact same fashion.        Onset of analgesia was noted.  Vital signs remained stable throughout.      ESTIMATED BLOOD LOSS:  <5 mL  SPECIMENS:  none    COMPLICATIONS:   No complications were noted.    TOLERANCE & DISCHARGE CONDITION:    The patient tolerated the procedure well.  The patient was transported to the recovery area without difficulties.  The patient was discharged to home under the care of family in stable and satisfactory condition.    Pre-procedure pain score: 7/10 at rest, 10/10 with activity  Post-procedure pain score: 0/10    PLAN OF CARE:  1. The patient was given our standard instruction sheet.  2. We discussed that Lumbar Medial Branch Blockade is a diagnostic procedure in consideration for radiofrequency ablation if two diagnostic procedures prove to be positive for significant benefit.  An alternative plan could be therapeutic lumbar branch blockades.  3. The patient is asked to keep an account of pain relief after the procedure today.  4. The patient will  Return to clinic 2-3 wks.  5. The patient will resume all medications as per the medication reconciliation sheet.  6. Advised to follow-up with PCP about sharp chest pain on right  lateral chest, worse with deep breath and twisting.

## 2022-12-19 DIAGNOSIS — E53.8 B12 DEFICIENCY: ICD-10-CM

## 2022-12-19 RX ORDER — LANOLIN ALCOHOL/MO/W.PET/CERES
CREAM (GRAM) TOPICAL
Qty: 30 TABLET | Refills: 5 | Status: SHIPPED | OUTPATIENT
Start: 2022-12-19

## 2022-12-22 ENCOUNTER — HOSPITAL ENCOUNTER (EMERGENCY)
Facility: HOSPITAL | Age: 62
Discharge: HOME | End: 2022-12-22
Attending: EMERGENCY MEDICINE | Admitting: EMERGENCY MEDICINE

## 2022-12-22 ENCOUNTER — APPOINTMENT (OUTPATIENT)
Dept: CT IMAGING | Facility: HOSPITAL | Age: 62
End: 2022-12-22

## 2022-12-22 ENCOUNTER — APPOINTMENT (OUTPATIENT)
Dept: GENERAL RADIOLOGY | Facility: HOSPITAL | Age: 62
End: 2022-12-22

## 2022-12-22 VITALS
TEMPERATURE: 98.4 F | OXYGEN SATURATION: 94 % | RESPIRATION RATE: 16 BRPM | HEART RATE: 67 BPM | SYSTOLIC BLOOD PRESSURE: 125 MMHG | DIASTOLIC BLOOD PRESSURE: 73 MMHG

## 2022-12-22 DIAGNOSIS — R10.11 RIGHT UPPER QUADRANT PAIN: Primary | ICD-10-CM

## 2022-12-22 DIAGNOSIS — R10.9 RIGHT FLANK PAIN: ICD-10-CM

## 2022-12-22 LAB
ALBUMIN SERPL-MCNC: 4.3 G/DL (ref 3.5–5.2)
ALBUMIN/GLOB SERPL: 2 G/DL
ALP SERPL-CCNC: 79 U/L (ref 39–117)
ALT SERPL W P-5'-P-CCNC: 24 U/L (ref 1–33)
ANION GAP SERPL CALCULATED.3IONS-SCNC: 14.7 MMOL/L (ref 5–15)
AST SERPL-CCNC: 17 U/L (ref 1–32)
BASOPHILS # BLD AUTO: 0.06 10*3/MM3 (ref 0–0.2)
BASOPHILS NFR BLD AUTO: 0.6 % (ref 0–1.5)
BILIRUB SERPL-MCNC: 0.3 MG/DL (ref 0–1.2)
BILIRUB UR QL STRIP: NEGATIVE
BUN SERPL-MCNC: 13 MG/DL (ref 8–23)
BUN/CREAT SERPL: 16.7 (ref 7–25)
CALCIUM SPEC-SCNC: 9.5 MG/DL (ref 8.6–10.5)
CHLORIDE SERPL-SCNC: 106 MMOL/L (ref 98–107)
CLARITY UR: CLEAR
CO2 SERPL-SCNC: 23.3 MMOL/L (ref 22–29)
COLOR UR: YELLOW
CREAT SERPL-MCNC: 0.78 MG/DL (ref 0.57–1)
DEPRECATED RDW RBC AUTO: 42.6 FL (ref 37–54)
EGFRCR SERPLBLD CKD-EPI 2021: 87.1 ML/MIN/1.73
EOSINOPHIL # BLD AUTO: 0 10*3/MM3 (ref 0–0.4)
EOSINOPHIL NFR BLD AUTO: 0 % (ref 0.3–6.2)
ERYTHROCYTE [DISTWIDTH] IN BLOOD BY AUTOMATED COUNT: 13.1 % (ref 12.3–15.4)
GLOBULIN UR ELPH-MCNC: 2.2 GM/DL
GLUCOSE SERPL-MCNC: 140 MG/DL (ref 65–99)
GLUCOSE UR STRIP-MCNC: NEGATIVE MG/DL
HCT VFR BLD AUTO: 45.3 % (ref 34–46.6)
HGB BLD-MCNC: 15 G/DL (ref 12–15.9)
HGB UR QL STRIP.AUTO: NEGATIVE
IMM GRANULOCYTES # BLD AUTO: 0.12 10*3/MM3 (ref 0–0.05)
IMM GRANULOCYTES NFR BLD AUTO: 1.2 % (ref 0–0.5)
KETONES UR QL STRIP: NEGATIVE
LEUKOCYTE ESTERASE UR QL STRIP.AUTO: NEGATIVE
LIPASE SERPL-CCNC: 51 U/L (ref 13–60)
LYMPHOCYTES # BLD AUTO: 1.27 10*3/MM3 (ref 0.7–3.1)
LYMPHOCYTES NFR BLD AUTO: 12.7 % (ref 19.6–45.3)
MCH RBC QN AUTO: 29.4 PG (ref 26.6–33)
MCHC RBC AUTO-ENTMCNC: 33.1 G/DL (ref 31.5–35.7)
MCV RBC AUTO: 88.6 FL (ref 79–97)
MONOCYTES # BLD AUTO: 0.23 10*3/MM3 (ref 0.1–0.9)
MONOCYTES NFR BLD AUTO: 2.3 % (ref 5–12)
NEUTROPHILS NFR BLD AUTO: 8.3 10*3/MM3 (ref 1.7–7)
NEUTROPHILS NFR BLD AUTO: 83.2 % (ref 42.7–76)
NITRITE UR QL STRIP: NEGATIVE
NRBC BLD AUTO-RTO: 0 /100 WBC (ref 0–0.2)
NT-PROBNP SERPL-MCNC: 61.5 PG/ML (ref 0–900)
PH UR STRIP.AUTO: 6.5 [PH] (ref 5–8)
PLATELET # BLD AUTO: 310 10*3/MM3 (ref 140–450)
PMV BLD AUTO: 10.8 FL (ref 6–12)
POTASSIUM SERPL-SCNC: 4.1 MMOL/L (ref 3.5–5.2)
PROT SERPL-MCNC: 6.5 G/DL (ref 6–8.5)
PROT UR QL STRIP: NEGATIVE
QT INTERVAL: 353 MS
RBC # BLD AUTO: 5.11 10*6/MM3 (ref 3.77–5.28)
SODIUM SERPL-SCNC: 144 MMOL/L (ref 136–145)
SP GR UR STRIP: <=1.005 (ref 1–1.03)
TROPONIN T SERPL-MCNC: <0.01 NG/ML (ref 0–0.03)
UROBILINOGEN UR QL STRIP: NORMAL
WBC NRBC COR # BLD: 9.98 10*3/MM3 (ref 3.4–10.8)

## 2022-12-22 PROCEDURE — 74177 CT ABD & PELVIS W/CONTRAST: CPT

## 2022-12-22 PROCEDURE — 25010000002 PROCHLORPERAZINE 10 MG/2ML SOLUTION: Performed by: EMERGENCY MEDICINE

## 2022-12-22 PROCEDURE — 93005 ELECTROCARDIOGRAM TRACING: CPT | Performed by: EMERGENCY MEDICINE

## 2022-12-22 PROCEDURE — 93010 ELECTROCARDIOGRAM REPORT: CPT | Performed by: INTERNAL MEDICINE

## 2022-12-22 PROCEDURE — 84484 ASSAY OF TROPONIN QUANT: CPT | Performed by: EMERGENCY MEDICINE

## 2022-12-22 PROCEDURE — 99284 EMERGENCY DEPT VISIT MOD MDM: CPT

## 2022-12-22 PROCEDURE — 71045 X-RAY EXAM CHEST 1 VIEW: CPT

## 2022-12-22 PROCEDURE — 71275 CT ANGIOGRAPHY CHEST: CPT

## 2022-12-22 PROCEDURE — 83690 ASSAY OF LIPASE: CPT | Performed by: EMERGENCY MEDICINE

## 2022-12-22 PROCEDURE — 96374 THER/PROPH/DIAG INJ IV PUSH: CPT

## 2022-12-22 PROCEDURE — 80053 COMPREHEN METABOLIC PANEL: CPT | Performed by: EMERGENCY MEDICINE

## 2022-12-22 PROCEDURE — 96375 TX/PRO/DX INJ NEW DRUG ADDON: CPT

## 2022-12-22 PROCEDURE — 0 IOPAMIDOL PER 1 ML: Performed by: EMERGENCY MEDICINE

## 2022-12-22 PROCEDURE — 85025 COMPLETE CBC W/AUTO DIFF WBC: CPT | Performed by: EMERGENCY MEDICINE

## 2022-12-22 PROCEDURE — 83880 ASSAY OF NATRIURETIC PEPTIDE: CPT | Performed by: EMERGENCY MEDICINE

## 2022-12-22 PROCEDURE — 25010000002 KETOROLAC TROMETHAMINE PER 15 MG: Performed by: EMERGENCY MEDICINE

## 2022-12-22 PROCEDURE — 25010000002 DIPHENHYDRAMINE PER 50 MG: Performed by: EMERGENCY MEDICINE

## 2022-12-22 PROCEDURE — 81003 URINALYSIS AUTO W/O SCOPE: CPT | Performed by: EMERGENCY MEDICINE

## 2022-12-22 RX ORDER — SODIUM CHLORIDE 0.9 % (FLUSH) 0.9 %
10 SYRINGE (ML) INJECTION AS NEEDED
Status: DISCONTINUED | OUTPATIENT
Start: 2022-12-22 | End: 2022-12-23 | Stop reason: HOSPADM

## 2022-12-22 RX ORDER — PROCHLORPERAZINE EDISYLATE 5 MG/ML
5 INJECTION INTRAMUSCULAR; INTRAVENOUS EVERY 6 HOURS PRN
Status: DISCONTINUED | OUTPATIENT
Start: 2022-12-22 | End: 2022-12-23 | Stop reason: HOSPADM

## 2022-12-22 RX ORDER — DIPHENHYDRAMINE HYDROCHLORIDE 50 MG/ML
25 INJECTION INTRAMUSCULAR; INTRAVENOUS ONCE
Status: COMPLETED | OUTPATIENT
Start: 2022-12-22 | End: 2022-12-22

## 2022-12-22 RX ORDER — KETOROLAC TROMETHAMINE 15 MG/ML
15 INJECTION, SOLUTION INTRAMUSCULAR; INTRAVENOUS ONCE
Status: COMPLETED | OUTPATIENT
Start: 2022-12-22 | End: 2022-12-22

## 2022-12-22 RX ADMIN — IOPAMIDOL 95 ML: 755 INJECTION, SOLUTION INTRAVENOUS at 21:02

## 2022-12-22 RX ADMIN — DIPHENHYDRAMINE HYDROCHLORIDE 25 MG: 50 INJECTION, SOLUTION INTRAMUSCULAR; INTRAVENOUS at 20:12

## 2022-12-22 RX ADMIN — KETOROLAC TROMETHAMINE 15 MG: 15 INJECTION, SOLUTION INTRAMUSCULAR; INTRAVENOUS at 20:11

## 2022-12-22 RX ADMIN — SODIUM CHLORIDE 1000 ML: 9 INJECTION, SOLUTION INTRAVENOUS at 20:08

## 2022-12-22 RX ADMIN — PROCHLORPERAZINE EDISYLATE 5 MG: 5 INJECTION INTRAMUSCULAR; INTRAVENOUS at 20:13

## 2022-12-22 NOTE — ED NOTES
"Patient states that she had a nerve block recently and on the way to that office appointment she experienced right sided chest pain that radiated into her right shoulder blade. Patient states that the pain later on went into her armpit and wrist of that right side.    Patient states that today she is \"clotting blood.\" Patient states that today she is having diffuse abdominal discomfort and bloating.  Patient states she has lost a moderate amount of blood in either her stool or urine.    "

## 2022-12-22 NOTE — ED PROVIDER NOTES
EMERGENCY DEPARTMENT ENCOUNTER    Room Number:  38/38  Date seen:  12/27/2022  PCP: Carrillo Drake MD  Historian: Patient  Relevant information and history provided by sources other than the patient will be included in the context section.  Review of pertinent past medical records may also be included in the context section rather than MDM to avoid unnecessary redundancy.    HPI:  Chief Complaint: Abdominal pain  A complete HPI/ROS/PMH/PSH/SH/FH are unobtainable due to: Nothing  Context: Gabi Tomas is a 62 y.o. female who presents to the ED c/o severe right upper quadrant abdominal pain and right lower chest pain this been off and on now for several days.  It sharp and stabbing and sometimes radiates to the shoulder blade.  She has no vomiting, but she does feel nauseated and bloated and has had some occasional diarrhea.  She does not have a fever, does not have shortness of breath, and is made worse by certain positions and activities.    Patient said that last week she had an epidural injection and feels like nothing is improved since then, if at all is gotten worse.  Patient endorses that she has had a previous cholecystectomy and no history of VTE        REVIEW OF SYSTEMS  Review of Systems   All negative except above      PAST MEDICAL HISTORY  Active Ambulatory Problems     Diagnosis Date Noted   • Fibromyalgia 04/22/2016   • Chronic depression 04/22/2016   • Acquired hypothyroidism 04/22/2016   • Hypercholesterolemia 03/06/2014   • Vitamin D deficiency 04/22/2016   • Antinuclear factor positive 08/28/2014   • Gait instability 08/28/2014   • Dyslipidemia 08/28/2014   • Pain 06/06/2014   • Autonomic neuropathy 05/16/2016   • Intractable migraine with aura without status migrainosus 05/16/2016   • Small fiber neuropathy 06/28/2016   • Chronic bilateral low back pain without sciatica 12/08/2016   • Chronic right shoulder pain 12/08/2016   • Chest pain 03/23/2017   • Lumbar spondylolysis 05/22/2017   •  Bilateral hand pain 10/12/2017   • Dizziness 03/23/2018   • Pelvic pain in female 03/28/2019   • Uterine tenderness 03/28/2019   • Renal stone 04/01/2019   • Cervical lesion 04/01/2019   • Morbidly obese (HCC) 04/23/2019   • Ovarian mass, left 05/15/2019   • Acute laryngitis 11/01/2018   • Acute upper respiratory infection 10/25/2018   • Seasonal allergic rhinitis 04/24/2014   • Anxiety 08/03/2016   • Body mass index (BMI) 38.0-38.9, adult 02/07/2022   • Cervical radiculopathy 02/18/2022   • Claustrophobia 05/19/2014   • Closed fracture of fifth metatarsal bone 12/02/2014   • Constipation 01/10/2017   • Contact with and (suspected) exposure to other viral communicable diseases 08/17/2020   • COVID-19 08/17/2021   • Cyst of Bartholin's gland duct 10/08/2019   • Diverticulosis of colon 05/16/2014   • Dysphagia 03/05/2014   • Foot pain 06/21/2016   • Gastritis 09/12/2014   • Hematochezia 03/20/2014   • Hemorrhoid 03/20/2014   • Herpes zoster 12/18/2015   • History of palpitations 08/17/2021   • History of renal calculi 10/18/2019   • Hyperplastic polyp of large intestine 05/16/2014   • Influenza-like symptoms 02/01/2018   • Memory loss 03/20/2014   • Menopause 09/29/2014   • Migraine headache 01/01/1998   • Obstructive sleep apnea 09/12/2014   • Osteoarthritis of multiple joints 11/11/2015   • Other enthesopathies, not elsewhere classified 02/07/2022   • Other long term (current) drug therapy 02/07/2022   • Other specified conditions associated with female genital organs and menstrual cycle 03/28/2019   • Parkinson's disease (HCC) 02/07/2022   • Personal history of COVID-19 02/07/2022   • History of cervical cancer 06/06/2019   • Right upper quadrant pain 03/20/2014   • Rosacea 03/28/2016   • Sciatica 02/01/2016   • Seasonal allergies 09/12/2014   • Sinusitis 09/11/2018   • Sjogren's syndrome (HCC) 08/03/2016   • Subcutaneous mass of right lower extremity 08/28/2020   • Tremor 03/20/2014   • Urge incontinence of urine  2014   • Urinary incontinence 2014   • Verruca vulgaris 2015   • Visual hallucination 2018   • Abdominal wall pain 06/10/2022   • Acute bronchitis 06/10/2022   • Diverticular disease 06/10/2022   • H/O: hypothyroidism 06/10/2022   • H/O: osteoarthritis 06/10/2022   • History of gastroesophageal reflux (GERD) 06/10/2022   • Personal history of other diseases of urinary system 2021   • Lumbar spondylosis 2022   • Lumbosacral spondylosis without myelopathy 2022     Resolved Ambulatory Problems     Diagnosis Date Noted   • PMB (postmenopausal bleeding) 2019     Past Medical History:   Diagnosis Date   • Allergic    • Anemia    • Arthritis    • Cancer (HCC)    • Cervical dysplasia    • Cervical mass    • Depression    • Difficulty walking    • Eating disorder    • Environmental allergies    • Fibromyalgia, primary    • GERD (gastroesophageal reflux disease)    • Headache    • Hyperlipidemia    • Hypothyroidism    • Kidney stone    • Low back pain    • Migraine    • Obesity    • Orthostatic hypertension    • Peptic ulceration    • Peripheral neuropathy    • Shingles    • Sleep apnea    • Syncope    • Urinary tract infection    • Visual impairment          PAST SURGICAL HISTORY  Past Surgical History:   Procedure Laterality Date   • CERVICAL CONIZATION     •  SECTION PRIMARY     • CHOLECYSTECTOMY     • DILATATION AND CURETTAGE     • MEDIAL BRANCH BLOCK Bilateral 2022    Procedure: LUMBAR MEDIAL BRANCH BLOCK Bilateral ~L3-S1;  Surgeon: Rosie Cuevas MD;  [place] [facility] [place];  Service: Pain Management;  Laterality: Bilateral;   • MEDIAL BRANCH BLOCK Bilateral 2022    Procedure: LUMBAR MEDIAL BRANCH BLOCK BILATERAL L3-S1 #2;  Surgeon: Rosie Cuevas MD;  [place] [facility] [place];  Service: Pain Management;  Laterality: Bilateral;         FAMILY HISTORY  Family History   Problem Relation Age of Onset   • Lymphoma Mother    • Depression Mother     • Migraines Mother    • Cancer Father    • Heart attack Father    • Psoriasis Father    • Alcohol abuse Father    • Aneurysm Brother    • COPD Brother    • Heart disease Brother    • Heart attack Brother    • Migraines Son    • Cancer Maternal Aunt    • Cancer Maternal Uncle    • Cancer Paternal Aunt    • Heart disease Paternal Aunt    • Cancer Paternal Uncle    • Heart disease Paternal Uncle    • Aneurysm Brother    • Lung disease Brother    • Alcohol abuse Brother    • ADD / ADHD Son    • Migraines Son          SOCIAL HISTORY  Social History     Socioeconomic History   • Marital status:    Tobacco Use   • Smoking status: Some Days     Packs/day: 0.25     Years: 42.00     Pack years: 10.50     Types: Cigarettes     Last attempt to quit: 10/1/2016     Years since quittin.2   • Smokeless tobacco: Never   Vaping Use   • Vaping Use: Never used   Substance and Sexual Activity   • Alcohol use: Yes     Comment: rare   • Drug use: No   • Sexual activity: Defer         ALLERGIES  Ampicillin, Cyclobenzaprine, Levofloxacin, Milnacipran, Quinolones, Savella [milnacipran hcl], Sulfa antibiotics, and Pregabalin          PHYSICAL EXAM  ED Triage Vitals [22]   Temp Heart Rate Resp BP SpO2   98.4 °F (36.9 °C) 90 -- -- 95 %      Temp src Heart Rate Source Patient Position BP Location FiO2 (%)   -- -- -- -- --       Physical Exam      GENERAL: no acute distress  HENT: nares patent  EYES: no scleral icterus, PERRL, EOMI  CV: regular rhythm, normal rate, distal pulses symmetric and palpable  RESPIRATORY: normal effort  ABDOMEN: soft, mild epigastric and right upper quadrant tenderness with no rebound or guarding with normal bowel sound  MUSCULOSKELETAL: no deformity  NEURO: alert, moves all extremities, follows commands  PSYCH:  calm, cooperative  SKIN: warm, dry with no rash    Vital signs and nursing notes reviewed.          LAB RESULTS  No results found for this or any previous visit (from the past 24  hour(s)).    Ordered the above labs and reviewed the results.        RADIOLOGY  No Radiology Exams Resulted Within Past 24 Hours    Ordered the above noted radiological studies. Reviewed by me in PACS.            PROCEDURES  Procedures              MEDICATIONS GIVEN IN ER  Medications   sodium chloride 0.9 % bolus 1,000 mL (0 mL Intravenous Stopped 12/22/22 2154)   diphenhydrAMINE (BENADRYL) injection 25 mg (25 mg Intravenous Given 12/22/22 2012)   ketorolac (TORADOL) injection 15 mg (15 mg Intravenous Given 12/22/22 2011)   iopamidol (ISOVUE-370) 76 % injection 100 mL (95 mL Intravenous Given 12/22/22 2102)                   MEDICAL DECISION MAKING, PROGRESS, and CONSULTS    All labs have been independently reviewed by me.    All radiology studies have been independently reviewed by me and discussed with radiologist dictating the report.    EKG's are independently viewed and interpreted by me.      The discussion below represents my analysis of pertinent findings related to patient's current condition, review of past medical history and available medical records, differential diagnosis, and discussion with consultants regarding this encounter.          ED Course as of 12/27/22 1121   Tue Dec 27, 2022   1120 CBC and chemistry unremarkable [DP]   1120 Lipase normal, no evidence of pancreatitis or liver/gallbladder inflammation [DP]   1120 Urinalysis unremarkable [DP]   1120 Troponin and proBNP negative, and along with an unremarkable EKG I do not think this cardiac [DP]   1120 Chest x-ray is negative [DP]   1120 CT scan of the chest abdomen and pelvis shows no evidence for pulmonary embolus, no pneumonia, no biliary stones or obstruction, no inflammatory change in the right upper quadrant or bowel obstruction. [DP]   1121 EKG at 1301  Normal sinus rhythm  Normal GA, QRS and QT  No acute ST segment changes seen to suggest ischemia, there is no change.  Previous from September 14, 2017. [DP]   1121 Please consider the  discussion above as considerations on differential diagnosis.  I believe this is a benign etiology of pain, no certain diagnosis but certainly no emergency that would require hospitalization or emergent intervention at this time. [DP]      ED Course User Index  [DP] Didier Scott MD              All appropriate hygiene and PPE requirements were satisfied with this patient encounter    FINAL DIAGNOSES  Final diagnoses:   Right upper quadrant pain   Right flank pain         DISPOSITION  Discharge            Latest Documented Vital Signs:  As of 11:21 EST  BP- 125/73 HR- 67 Temp- 98.4 °F (36.9 °C) O2 sat- 94%        --    Please note that portions of this were completed with a voice recognition program.       Note Disclaimer: At [facility], we believe that sharing information builds trust and better relationships. You are receiving this note because you are receiving care at [facility] or recently visited. It is possible you will see health information before a provider has talked with you about it. This kind of information can be easy to misunderstand. To help you fully understand what it      Didier Scott MD  12/27/22 9581

## 2023-01-04 DIAGNOSIS — M79.7 FIBROMYALGIA: Primary | ICD-10-CM

## 2023-01-04 RX ORDER — GABAPENTIN 800 MG/1
TABLET ORAL
Qty: 90 TABLET | Refills: 1 | Status: SHIPPED | OUTPATIENT
Start: 2023-01-04

## 2023-01-11 ENCOUNTER — PREP FOR SURGERY (OUTPATIENT)
Dept: SURGERY | Facility: SURGERY CENTER | Age: 63
End: 2023-01-11
Payer: MEDICARE

## 2023-01-11 ENCOUNTER — OFFICE VISIT (OUTPATIENT)
Dept: PAIN MEDICINE | Facility: CLINIC | Age: 63
End: 2023-01-11
Payer: MEDICARE

## 2023-01-11 VITALS — BODY MASS INDEX: 39.76 KG/M2 | WEIGHT: 224.4 LBS | OXYGEN SATURATION: 93 % | HEIGHT: 63 IN | TEMPERATURE: 97.8 F

## 2023-01-11 DIAGNOSIS — M47.817 LUMBOSACRAL SPONDYLOSIS WITHOUT MYELOPATHY: Primary | ICD-10-CM

## 2023-01-11 DIAGNOSIS — M54.50 CHRONIC BILATERAL LOW BACK PAIN WITHOUT SCIATICA: ICD-10-CM

## 2023-01-11 DIAGNOSIS — M47.816 LUMBAR SPONDYLOSIS: Primary | ICD-10-CM

## 2023-01-11 DIAGNOSIS — G89.29 CHRONIC BILATERAL LOW BACK PAIN WITHOUT SCIATICA: ICD-10-CM

## 2023-01-11 PROCEDURE — 99214 OFFICE O/P EST MOD 30 MIN: CPT | Performed by: PHYSICIAN ASSISTANT

## 2023-01-11 RX ORDER — SODIUM CHLORIDE 0.9 % (FLUSH) 0.9 %
10 SYRINGE (ML) INJECTION EVERY 12 HOURS SCHEDULED
Status: CANCELLED | OUTPATIENT
Start: 2023-01-11

## 2023-01-11 RX ORDER — SODIUM CHLORIDE 0.9 % (FLUSH) 0.9 %
10 SYRINGE (ML) INJECTION AS NEEDED
Status: CANCELLED | OUTPATIENT
Start: 2023-01-11

## 2023-01-11 NOTE — PROGRESS NOTES
CHIEF COMPLAINT  F/U back pain- LUMBAR MEDIAL BRANCH BLOCK BILATERAL L3-S1 #2- patient states that she has had 100% improvement for 3 weeks.     Subjective   Gabi Tomas is a 62 y.o. female  who presents to the office for follow-up of procedure.  She completed a #2 confirmatory bilateral L3-S1 MBB   on 12/16/2022 performed by Dr. Cuevas for management of low back pain. Patient reports 100% relief from the procedure x3 weeks.  Patient has since noted a gradual recrudescence of pain in a bandlike distribution across lumbosacral spine referred into the bilateral lumbar paraspinal muscles without lower extremity radicular symptoms.    Pain today 3/10 VAS in severity.        Back Pain  This is a chronic problem. The current episode started more than 1 year ago. The problem occurs constantly. The problem is unchanged. The pain is present in the lumbar spine. The quality of the pain is described as aching and cramping. The pain does not radiate. The pain is at a severity of 3/10. The pain is mild. The pain is worse during the day. The symptoms are aggravated by bending, position, standing and twisting. Associated symptoms include abdominal pain, headaches, numbness and weakness. Pertinent negatives include no chest pain, dysuria or fever.        PEG Assessment   What number best describes your pain on average in the past week?1  What number best describes how, during the past week, pain has interfered with your enjoyment of life?1  What number best describes how, during the past week, pain has interfered with your general activity?  1    Review of Pertinent Medical Data ---    Narrative & Impression   PROCEDURE:  XR SPINE LUMBAR 2 OR 3 VW-, XR SPINE CERVICAL 2 OR 3 VW-     HISTORY: Chronic neck and back pain.     COMPARISON: CT abdomen and pelvis 4/26/2019     FINDINGS:       3 views of the cervical spine were obtained. The cervical spine is  visualized from C2 through C6 on the lateral view. There is  straightening of  the normal cervical lordosis. There is trace  retrolisthesis of C5 on C6. Vertebral body heights are maintained. There  is moderate disc height loss at C5-6, where there are tiny degenerative  endplate osteophytes. There is mild uncovertebral hypertrophy.     3 views of the lumbar spine were obtained. There is a normal lumbar  lordosis. There is trace retrolisthesis of L2 on L3 and L3 on L4.  Vertebral body heights are maintained. There is at least moderate disc  height loss at L2-3 with lesser degrees of disc height loss at  additional levels. There are small degenerative endplate osteophytes at  L2-3. There is multilevel facet osteoarthropathy. There are surgical  clips in the right upper quadrant.     This report was finalized on 10/12/2022 9:54 AM by Dr. Aye Alvarez M.D.         The following portions of the patient's history were reviewed and updated as appropriate: allergies, current medications, past family history, past medical history, past social history, past surgical history and problem list.    Review of Systems   Constitutional: Positive for activity change (decreased) and fatigue. Negative for chills and fever.   HENT: Negative for congestion.    Eyes: Negative for visual disturbance.   Respiratory: Positive for shortness of breath. Negative for chest tightness.    Cardiovascular: Negative for chest pain.   Gastrointestinal: Positive for abdominal pain. Negative for constipation and diarrhea.   Genitourinary: Negative for difficulty urinating, dyspareunia and dysuria.   Musculoskeletal: Positive for back pain.   Neurological: Positive for dizziness, weakness, light-headedness, numbness and headaches.   Psychiatric/Behavioral: Positive for agitation and sleep disturbance. The patient is nervous/anxious.      I have reviewed and confirmed the accuracy of the ROS as documented by the MA/LPN/INDER Miranda     Vitals:    01/11/23 1057   Temp: 97.8 °F (36.6 °C)   SpO2: 93%   Weight: 102 kg (224  "lb 6.4 oz)   Height: 160 cm (63\")   PainSc:   3   PainLoc: Back         Objective   Physical Exam  Vitals and nursing note reviewed. Chaperone present:  is present.   Constitutional:       Appearance: Normal appearance.   HENT:      Head: Normocephalic.   Pulmonary:      Effort: Pulmonary effort is normal.   Musculoskeletal:      Lumbar back: Spasms and tenderness (Tenderness palpation within the overlying bilateral lumbar facet joint spaces) present. Decreased range of motion.      Comments: Pain is increased with lumbar extension   Skin:     General: Skin is warm and dry.   Neurological:      General: No focal deficit present.      Mental Status: She is alert and oriented to person, place, and time.      Cranial Nerves: Cranial nerves 2-12 are intact.      Sensory: Sensation is intact.      Motor: Weakness present.      Gait: Gait abnormal (Ambulates with a cane).   Psychiatric:         Mood and Affect: Mood normal.         Behavior: Behavior normal.         Thought Content: Thought content normal.         Judgment: Judgment normal.         Assessment & Plan   Diagnoses and all orders for this visit:    1. Lumbosacral spondylosis without myelopathy (Primary)    2. Chronic bilateral low back pain without sciatica        --- Based on patient's positive response to diagnostic and confirmatory MBB's she will return for RFA bilateral L3-S1 to initiate treatment on the right side  --- RTC in 4-6 weeks after ablation procedure       Thermal Radiofrequency Destruction    This initial thermal radiofrequency destruction (RFA) of cervical, thoracic, or lumbar paravertebral facet joint (medial branch) nerves is considered medically reasonable and necessary as this patient has met the criteria of having at least two (2) medically reasonable and necessary diagnostic MBBs, with each one providing a consistent minimum of 80% sustained relief of primary (index) pain (with the duration of relief being consistent with the " agent used).    This repeat thermal radiofrequency destruction (RFA) of cervical, thoracic, or lumbar paravertebral facet joint (medial branch) nerves at the same anatomical site is considered medically reasonable and necessary as this patient has met the criteria of having a minimum of consistent 50% improvement in pain for at least six (6) months or at least 50% consistent improvement in the ability to perform previously painful movements and ADLs as compared to baseline measurement using the same scale.       Dictated utilizing Dragon dictation.      Patient remained masked during entire encounter. No cough present. I donned a mask and eye protection throughout entire visit. Prior to donning mask and eye protection, hand hygiene was performed, as well as when it was doffed.  I was closer than 6 feet, but not for an extended period of time. No obvious exposure to any bodily fluids.

## 2023-01-13 ENCOUNTER — OFFICE VISIT (OUTPATIENT)
Dept: NEUROLOGY | Facility: CLINIC | Age: 63
End: 2023-01-13
Payer: MEDICARE

## 2023-01-13 ENCOUNTER — TRANSCRIBE ORDERS (OUTPATIENT)
Dept: SURGERY | Facility: SURGERY CENTER | Age: 63
End: 2023-01-13
Payer: MEDICARE

## 2023-01-13 VITALS
WEIGHT: 224 LBS | DIASTOLIC BLOOD PRESSURE: 76 MMHG | OXYGEN SATURATION: 96 % | SYSTOLIC BLOOD PRESSURE: 128 MMHG | HEART RATE: 84 BPM | HEIGHT: 63 IN | BODY MASS INDEX: 39.69 KG/M2

## 2023-01-13 DIAGNOSIS — R25.1 FUNCTIONAL TREMOR: ICD-10-CM

## 2023-01-13 DIAGNOSIS — Z41.9 SURGERY, ELECTIVE: Primary | ICD-10-CM

## 2023-01-13 DIAGNOSIS — G25.0 BENIGN ESSENTIAL TREMOR: Primary | ICD-10-CM

## 2023-01-13 PROBLEM — Z87.898 H/O URINARY INCONTINENCE: Status: ACTIVE | Noted: 2021-08-17

## 2023-01-13 PROBLEM — R10.11 ABDOMINAL WALL PAIN IN RIGHT UPPER QUADRANT: Status: ACTIVE | Noted: 2022-06-10

## 2023-01-13 PROCEDURE — 99213 OFFICE O/P EST LOW 20 MIN: CPT | Performed by: PSYCHIATRY & NEUROLOGY

## 2023-01-13 RX ORDER — TRAMADOL HYDROCHLORIDE 50 MG/1
TABLET ORAL
COMMUNITY

## 2023-01-13 RX ORDER — DULOXETIN HYDROCHLORIDE 60 MG/1
1 CAPSULE, DELAYED RELEASE ORAL 2 TIMES DAILY
COMMUNITY
Start: 2022-11-10

## 2023-01-13 RX ORDER — CODEINE PHOSPHATE AND GUAIFENESIN 10; 100 MG/5ML; MG/5ML
SOLUTION ORAL
COMMUNITY
Start: 2022-12-05

## 2023-01-13 RX ORDER — GABAPENTIN 600 MG/1
TABLET ORAL
Status: ON HOLD | COMMUNITY
End: 2023-02-01

## 2023-01-13 RX ORDER — CYCLOBENZAPRINE HCL 5 MG
TABLET ORAL
COMMUNITY

## 2023-01-13 NOTE — PROGRESS NOTES
Chief Complaint   Patient presents with   • Tremors       Patient ID: Gabi Tomas is a 62 y.o. female.    HPI: I have had the pleasure of seeing your patient again today.  As you may know she is a 62-year-old female here for the management of tremor.  She continues to have the tremoring in her upper extremities bilaterally.  Seems to be predominant to the left upper extremity.  She still is experiencing these symptoms and indicated basis.  She is no longer taking Sinemet.  There have been no significant progression or improvement of symptoms.  She says that she has pains in her fingers and then the Sinemet may have helped her.  She is wanting to start back on the medication.  We did schedule her for an EMG/nerve conduction of both lower extremities.  There were some changes in the sural nerve, left however no other signs of peripheral neuropathy.  She is convinced that the Sinemet did help her symptoms however.    The following portions of the patient's history were reviewed and updated as appropriate: allergies, current medications, past family history, past medical history, past social history, past surgical history and problem list.    Review of Systems   Neurological: Positive for dizziness, tremors, speech difficulty, weakness, light-headedness, numbness and headaches. Negative for seizures and syncope.   Hematological: Bruises/bleeds easily.   Psychiatric/Behavioral: Positive for agitation, confusion, decreased concentration and sleep disturbance. Negative for behavioral problems, hallucinations, self-injury and suicidal ideas. The patient is nervous/anxious.       I have reviewed the review of systems above performed by my medical assistant.      Vitals:    01/13/23 1257   BP: 128/76   Pulse: 84   SpO2: 96%       Neurologic Exam     Mental Status   Oriented to person, place, and time.   Concentration: normal.   Level of consciousness: alert  Knowledge: consistent with education (No deficits found.).      Cranial Nerves     CN II   Visual fields full to confrontation.     CN III, IV, VI   Pupils are equal, round, and reactive to light.  Extraocular motions are normal.   CN III: no CN III palsy  CN VI: no CN VI palsy    CN V   Facial sensation intact.     CN VII   Facial expression full, symmetric.     CN VIII   CN VIII normal.     CN IX, X   CN IX normal.   CN X normal.     CN XI   CN XI normal.     CN XII   CN XII normal.     Motor Exam     Strength   Right neck flexion: 5/5  Left neck flexion: 5/5  Right neck extension: 5/5  Left neck extension: 5/5  Right deltoid: 5/5  Left deltoid: 5/5  Right biceps: 5/5  Left biceps: 5/5  Right triceps: 5/5  Left triceps: 5/5  Right wrist flexion: 5/5  Left wrist flexion: 5/5  Right wrist extension: 5/5  Left wrist extension: 5/5  Right interossei: 5/5  Left interossei: 5/5  Right abdominals: 5/5  Left abdominals: 5/5  Right iliopsoas: 5/5  Left iliopsoas: 5/5  Right quadriceps: 5/5  Left quadriceps: 5/5  Right hamstrin/5  Left hamstrin/5  Right glutei: 5/5  Left glutei: 5/5  Right anterior tibial: 5/5  Left anterior tibial: 5/5  Right posterior tibial: 5/5  Left posterior tibial: 5/5  Right peroneal: 5/5  Left peroneal: 5/5  Right gastroc: 5/5  Left gastroc: 5/5    Sensory Exam   Light touch normal.   Vibration normal.     Gait, Coordination, and Reflexes     Gait  Gait: normal    Reflexes   Right brachioradialis: 2+  Left brachioradialis: 2+  Right biceps: 2+  Left biceps: 2+  Right triceps: 2+  Left triceps: 2+  Right patellar: 2+  Left patellar: 2+  Right achilles: 2+  Left achilles: 2+  Right : 2+  Left : 2+Station is normal.  There is what appears to be an inconsistent or functional type tremor.       Physical Exam  Vitals reviewed.   Constitutional:       Appearance: She is well-developed.   HENT:      Head: Normocephalic and atraumatic.   Eyes:      Extraocular Movements: EOM normal.      Pupils: Pupils are equal, round, and reactive to light.    Cardiovascular:      Rate and Rhythm: Normal rate and regular rhythm.   Pulmonary:      Breath sounds: Normal breath sounds.   Musculoskeletal:         General: Normal range of motion.   Skin:     General: Skin is warm.   Neurological:      Mental Status: She is oriented to person, place, and time.      Gait: Gait is intact.      Deep Tendon Reflexes:      Reflex Scores:       Tricep reflexes are 2+ on the right side and 2+ on the left side.       Bicep reflexes are 2+ on the right side and 2+ on the left side.       Brachioradialis reflexes are 2+ on the right side and 2+ on the left side.       Patellar reflexes are 2+ on the right side and 2+ on the left side.       Achilles reflexes are 2+ on the right side and 2+ on the left side.        Procedures    Assessment/Plan: We are going to refer her for second opinion at the movement disorder clinic at [facility].  At this time we will not be starting her back on the Sinemet.  We will see her after this referral has been completed.  A total of 25 minutes was spent face-to-face with the patient today.  Of that greater than 50% of this time was spent discussing signs and symptoms of tremor, patient education, plan of care and prognosis.       Diagnoses and all orders for this visit:    1. Benign essential tremor (Primary)  -     Ambulatory Referral to Neurology    2. Functional tremor  -     Ambulatory Referral to Neurology           Jona Garcia II, MD

## 2023-01-17 ENCOUNTER — TRANSCRIBE ORDERS (OUTPATIENT)
Dept: SURGERY | Facility: SURGERY CENTER | Age: 63
End: 2023-01-17
Payer: MEDICARE

## 2023-01-17 DIAGNOSIS — Z41.9 SURGERY, ELECTIVE: Primary | ICD-10-CM

## 2023-01-25 DIAGNOSIS — M47.817 LUMBOSACRAL SPONDYLOSIS WITHOUT MYELOPATHY: Primary | ICD-10-CM

## 2023-01-25 RX ORDER — ETODOLAC 400 MG/1
400 TABLET, FILM COATED ORAL 2 TIMES DAILY
Qty: 60 TABLET | Refills: 0 | Status: SHIPPED | OUTPATIENT
Start: 2023-01-25

## 2023-02-01 ENCOUNTER — HOSPITAL ENCOUNTER (OUTPATIENT)
Facility: SURGERY CENTER | Age: 63
Setting detail: HOSPITAL OUTPATIENT SURGERY
Discharge: HOME | End: 2023-02-01
Attending: ANESTHESIOLOGY | Admitting: ANESTHESIOLOGY
Payer: MEDICARE

## 2023-02-01 ENCOUNTER — HOSPITAL ENCOUNTER (OUTPATIENT)
Dept: GENERAL RADIOLOGY | Facility: SURGERY CENTER | Age: 63
Setting detail: HOSPITAL OUTPATIENT SURGERY
End: 2023-02-01
Payer: MEDICARE

## 2023-02-01 VITALS
RESPIRATION RATE: 16 BRPM | HEART RATE: 84 BPM | OXYGEN SATURATION: 95 % | SYSTOLIC BLOOD PRESSURE: 115 MMHG | BODY MASS INDEX: 39.69 KG/M2 | WEIGHT: 224 LBS | DIASTOLIC BLOOD PRESSURE: 84 MMHG | TEMPERATURE: 97.4 F | HEIGHT: 63 IN

## 2023-02-01 DIAGNOSIS — Z41.9 SURGERY, ELECTIVE: ICD-10-CM

## 2023-02-01 PROCEDURE — 64636 DESTROY L/S FACET JNT ADDL: CPT | Performed by: ANESTHESIOLOGY

## 2023-02-01 PROCEDURE — 64635 DESTROY LUMB/SAC FACET JNT: CPT | Performed by: ANESTHESIOLOGY

## 2023-02-01 PROCEDURE — 76000 FLUOROSCOPY <1 HR PHYS/QHP: CPT

## 2023-02-01 PROCEDURE — 25010000002 FENTANYL CITRATE (PF) 50 MCG/ML SOLUTION: Performed by: ANESTHESIOLOGY

## 2023-02-01 PROCEDURE — 25010000002 MIDAZOLAM PER 1 MG: Performed by: ANESTHESIOLOGY

## 2023-02-01 RX ORDER — SODIUM CHLORIDE 0.9 % (FLUSH) 0.9 %
10 SYRINGE (ML) INJECTION EVERY 12 HOURS SCHEDULED
Status: DISCONTINUED | OUTPATIENT
Start: 2023-02-01 | End: 2023-02-01 | Stop reason: HOSPADM

## 2023-02-01 RX ORDER — FENTANYL CITRATE 50 UG/ML
INJECTION, SOLUTION INTRAMUSCULAR; INTRAVENOUS AS NEEDED
Status: DISCONTINUED | OUTPATIENT
Start: 2023-02-01 | End: 2023-02-01 | Stop reason: HOSPADM

## 2023-02-01 RX ORDER — SODIUM CHLORIDE 0.9 % (FLUSH) 0.9 %
10 SYRINGE (ML) INJECTION AS NEEDED
Status: DISCONTINUED | OUTPATIENT
Start: 2023-02-01 | End: 2023-02-01 | Stop reason: HOSPADM

## 2023-02-01 RX ORDER — MIDAZOLAM HYDROCHLORIDE 1 MG/ML
INJECTION INTRAMUSCULAR; INTRAVENOUS AS NEEDED
Status: DISCONTINUED | OUTPATIENT
Start: 2023-02-01 | End: 2023-02-01 | Stop reason: HOSPADM

## 2023-02-01 NOTE — OP NOTE
Radiofrequency ablation of left L3-S1  [facility]    PREOPERATIVE DIAGNOSIS:  Lumbar spondylosis without myelopathy   POSTOPERATIVE DIAGNOSIS:  Lumbar spondylosis without myelopathy     PROCEDURES PERFORMED:   Left  lumbar radiofrequency ablation of the medial branches at the transverse processes of L3, L4, L5, and the sacral alar groove to thermally treat these facet joints.  1.  00132 -LT Lumbar radiofrequency ablation 1st level.  2.  40014 -LT Lumbar radiofrequency ablation 2nd level.  3.  78006 -LT Lumbar radiofrequency ablation 3rd level.     INFORMED CONSENT:  In preprocedure discussion with the patient, the risks and complications were discussed prior to starting the procedure and informed consent was obtained.     SURGEON:   Rosie Cuevas MD    INDICATIONS:  The patient presents with chronic lower back pain. The patient underwent 2 lumbar medial branch blocks with diagnostically positive relief. Given the patient’s significant pain relief, it is diagnostic that we have likely found the source of the patient’s pain; therefore, lumbar radiofrequency ablation has been indicated. Due to significant spondylosis in more than 2 levels of facet joints, 3 levels will be treated on this patient.      SEDATION:  Light sedation was used for this procedure which included and Versed 1mg & Fentanyl 50mcg    TIME OF PROCEDURE:  The Interoperative procedure time, after administration of the IV sedative, was 14 minutes.    ANESTHETIC:  Lidocaine 1% for skin infiltration, 2% lidocaine and 0.5% bupivacaine for injection.    STEROID:  None.    DESCRIPTION OF PROCEDURE:  After obtaining informed consent an IV was  started in the preoperative area. The patient was taken to the operating room. The patient was placed in prone position with a pillow under the abdomen. All pressure points were padded. EKG, blood pressure, and pulse oximetry were monitored. The patient was  sedated. The lumbosacral area was  prepped with ChloraPrep and draped in a sterile fashion.     The junction of the left S1 superior articular process and sacral ala was identified in a AP fluoroscopic view. The skin and subcutaneous tissue inferior to the junction was anesthetized with 1% lidocaine. A 20-gauge 150mm RF Abbott needle was then advanced percutaneously through the anesthetized skin tract under fluoroscopic guidance until the non-insulated portion of the needle lie at the junction.  The image was then obliqued towards the left side to maximize visualization of the junctions of the L3, L4, L5 superior articular processes with the transverse processes.  Needle placement was performed as described above until the non-insulated portion of the needles lie at the targeted junctions.  All needle tips were confirmed to be posterior to the neural foramen in the lateral fluoroscopic view. Sensory stimulation was then performed with good stimulation of the back at 0.5V or less at each level. Motor stimulation was performed up to 1.5V at each level producing stimulation of the multifidus muscles of the back and no stimulation of the lower extremity at any level. Each level was then anesthetized with 2% lidocaine prior to treatment with pulsed radiofrequency thermocoagulation at 42 degrees Celsius. Each level was then treated with thermal radiofrequency thermocoagulation at 80 degrees Celsius for 60 seconds in two separate cycles.  Prior to the removal of each needle, a volume of 1 mL of injectate was administered at each site.  The total volume consisted of 1mL of 2% lidocaine and 1mL of 0.5% bupivacaine.    ESTIMATED BLOOD LOSS:  Minimal.    SPECIMENS:  None.    COMPLICATIONS:  None.    TOLERANCE AND DISCHARGE:  The patient tolerated the procedure well. The patient was transported to the recovery area without difficulties. The patient was discharged home under the care of family in stable and satisfactory condition.    PLAN:  1.  The patient was  given our standard instruction sheet.  2.  The patient will resume all medications per the medication reconciliation sheet.  3.  The patient will Repeat injection 2 wks.

## 2023-02-08 ENCOUNTER — TELEPHONE (OUTPATIENT)
Dept: NEUROLOGY | Facility: CLINIC | Age: 63
End: 2023-02-08

## 2023-02-08 ENCOUNTER — TELEPHONE (OUTPATIENT)
Dept: NEUROLOGY | Facility: CLINIC | Age: 63
End: 2023-02-08
Payer: MEDICARE

## 2023-02-08 NOTE — TELEPHONE ENCOUNTER
Provider: LEONEL  Caller: PATIENT  Relationship to Patient: SELF  Phone Number: [phone]  Reason for Call: PATIENT HAD COVID IN AUGUST 2021 AND LOST HER SENSE OF TASTE AND SMELL.  FOR THE PAST FEW MONTHS THE PATIENT HAS BEEN SMELLING THE SCENT OF LAUNDRY BEADS AT NIGHT AND A MUSTY SMELL DURING THE DAY.  AT TIMES IT IS OVERWHELMING AND MAKES HER NAUSEOUS AND SOMETIMES MAKES HER FEEL LIKE SHE IS HAVING A SMALL PANIC ATTACK.      ALSO ON PATIENT'S LAST VISIT, 1/13/23, DR. CASTANEDA MENTIONED SENDING THE PATIENT TO A MOVEMENT SPECIALIST AND SHE HAS NOT HEARD ANYTHING ABOUT THAT.      PLEASE REVIEW AND ADVISE     THANK YOU

## 2023-02-08 NOTE — TELEPHONE ENCOUNTER
A referral was placed for patient to been with Pain Management. Per the providers at [facility], they are asking for Dr. Saldana to order MRI Lumbar spine. Once patient has that scan complete they can schedule patient.    Please advise

## 2023-02-09 ENCOUNTER — TELEPHONE (OUTPATIENT)
Dept: NEUROLOGY | Facility: CLINIC | Age: 63
End: 2023-02-09
Payer: MEDICARE

## 2023-02-09 NOTE — TELEPHONE ENCOUNTER
Spoke with patient and made her aware, of her upcoming appointment with STEFANY Mead. Patient is scheduled for 4/13 at 10:00am, patient needs to arrive   at 9:45am. This appointment is located at  [facility] [address], [place] [address]

## 2023-02-13 ENCOUNTER — OFFICE VISIT (OUTPATIENT)
Dept: OBSTETRICS AND GYNECOLOGY | Age: 63
End: 2023-02-13
Payer: MEDICARE

## 2023-02-13 VITALS
SYSTOLIC BLOOD PRESSURE: 126 MMHG | DIASTOLIC BLOOD PRESSURE: 74 MMHG | HEIGHT: 63 IN | WEIGHT: 223 LBS | BODY MASS INDEX: 39.51 KG/M2

## 2023-02-13 DIAGNOSIS — K64.9 HEMORRHOIDS, UNSPECIFIED HEMORRHOID TYPE: ICD-10-CM

## 2023-02-13 DIAGNOSIS — N95.2 ATROPHIC VAGINITIS: ICD-10-CM

## 2023-02-13 DIAGNOSIS — Z12.31 BREAST CANCER SCREENING BY MAMMOGRAM: ICD-10-CM

## 2023-02-13 DIAGNOSIS — Z01.419 WELL WOMAN EXAM WITH ROUTINE GYNECOLOGICAL EXAM: Primary | ICD-10-CM

## 2023-02-13 DIAGNOSIS — G90.9 AUTONOMIC NEUROPATHY: ICD-10-CM

## 2023-02-13 DIAGNOSIS — Z12.4 SCREENING FOR CERVICAL CANCER: ICD-10-CM

## 2023-02-13 DIAGNOSIS — Z00.00 ENCOUNTER FOR ANNUAL PHYSICAL EXAM: ICD-10-CM

## 2023-02-13 PROCEDURE — 3008F BODY MASS INDEX DOCD: CPT | Performed by: OBSTETRICS & GYNECOLOGY

## 2023-02-13 PROCEDURE — 99386 PREV VISIT NEW AGE 40-64: CPT | Performed by: OBSTETRICS & GYNECOLOGY

## 2023-02-13 PROCEDURE — 2014F MENTAL STATUS ASSESS: CPT | Performed by: OBSTETRICS & GYNECOLOGY

## 2023-02-13 NOTE — PROGRESS NOTES
Subjective   Chief Complaint   Patient presents with   • Gynecologic Exam     Annual, last pap 2019 neg/ hpv neg, mg 2022, no dexa csy 2023  Cc;  no problems      History of Present Illness  Wellness exam  Gabi Tomas is a very pleasant  62 y.o. female .  , Mammo Exam 2022 order placed for this summer, , Exercise none  Patient wanted to come in for an annual exam its been a while since she has been seen..  She previously had RAYSHAWN-3 underwent a conization since then her Pap smears have been okay she was found to have a Bartholin's gland cyst that was confused on one of her CT scans.  She has multiple multiple medical problems going on.  She has an autonomic neuropathy she has hemorrhoids, she is under went radiofrequency ablation of some of her nerve roots.    Obstetric History:  OB History    No obstetric history on file.        Menstrual History:     No LMP recorded. Patient is postmenopausal.       Sexual History:       Past Medical History:   Diagnosis Date   • Allergic    • Anemia    • Anxiety    • Arthritis    • Autonomic neuropathy    • Cancer (HCC)     cervical   • Cervical dysplasia     Conization in the past   • Cervical mass    • Depression    • Difficulty walking    • Eating disorder    • Environmental allergies    • Fibromyalgia, primary    • GERD (gastroesophageal reflux disease)    • Headache    • Hyperlipidemia    • Hypothyroidism    • Kidney stone    • Low back pain    • Migraine    • Obesity    • Orthostatic hypertension    • Peptic ulceration    • Peripheral neuropathy    • Shingles    • Sleep apnea    • Syncope    • Tremor    • Urinary tract infection    • Visual impairment     glasses     Past Surgical History:   Procedure Laterality Date   • CERVICAL CONIZATION     •  SECTION PRIMARY     • CHOLECYSTECTOMY     • DILATATION AND CURETTAGE     • MEDIAL BRANCH BLOCK Bilateral 2022    Procedure: LUMBAR MEDIAL BRANCH BLOCK Bilateral ~L3-S1;  Surgeon: Faith  Rosie BACON MD;  Location: [facility];  Service: Pain Management;  Laterality: Bilateral;   • MEDIAL BRANCH BLOCK Bilateral 12/16/2022    Procedure: LUMBAR MEDIAL BRANCH BLOCK BILATERAL L3-S1 #2;  Surgeon: Rosie Cuevas MD;  Location: [facility];  Service: Pain Management;  Laterality: Bilateral;   • NERVE SURGERY Left 2/1/2023    Procedure: LUMBAR RADIOFREQUENCY ABLATION LEFT L3-S1;  Surgeon: Rosie Cuevas MD;  Location: [facility];  Service: Pain Management;  Laterality: Left;       Current Outpatient Medications:   •  ALPRAZolam (XANAX) 0.5 MG tablet, TK 1 T PO  BID PRN, Disp: , Rfl: 5  •  carbidopa-levodopa (SINEMET)  MG per tablet, carbidopa 25 mg-levodopa 100 mg tablet  TAKE 2 TABLETS BY MOUTH THREE TIMES DAILY, Disp: , Rfl:   •  cyclobenzaprine (FLEXERIL) 5 MG tablet, cyclobenzaprine 5 mg tablet, Disp: , Rfl:   •  DULoxetine (CYMBALTA) 60 MG capsule, Take 1 capsule by mouth 2 (Two) Times a Day., Disp: , Rfl:   •  etodolac (LODINE) 400 MG tablet, Take 1 tablet by mouth 2 (Two) Times a Day., Disp: 60 tablet, Rfl: 0  •  famotidine (PEPCID) 20 MG tablet, Take 20 mg by mouth Daily., Disp: , Rfl:   •  fexofenadine (ALLEGRA) 180 MG tablet, Take 1 tablet by mouth Daily., Disp: 30 tablet, Rfl: 12  •  fluticasone (FLONASE) 50 MCG/ACT nasal spray, 2 sprays into each nostril Daily., Disp: , Rfl:   •  gabapentin (NEURONTIN) 800 MG tablet, TAKE 1 TABLET BY MOUTH THREE TIMES DAILY, Disp: 90 tablet, Rfl: 1  •  guaiFENesin-codeine (ROMILAR-AC) 100-10 MG/5ML syrup, TAKE 5 TO 10 ML BY MOUTH EVERY 4 HOURS AS NEEDED FOR COUGH, Disp: , Rfl:   •  HYDROcodone-acetaminophen (NORCO) 7.5-325 MG per tablet, Take 1 tablet by mouth Daily As Needed for Moderate Pain ., Disp: 30 tablet, Rfl: 0  •  methocarbamol (ROBAXIN) 500 MG tablet, Take 1 tablet by mouth 3 (Three) Times a Day As Needed., Disp: , Rfl:   •  montelukast (SINGULAIR) 10 MG tablet, Take 10 mg by mouth., Disp: , Rfl:   •  ondansetron ODT (ZOFRAN-ODT) 4 MG  "disintegrating tablet, As Needed., Disp: , Rfl:   •  rosuvastatin (CRESTOR) 20 MG tablet, Take 1 tablet by mouth Daily., Disp: , Rfl:   •  vitamin B-12 (CYANOCOBALAMIN) 1000 MCG tablet, TAKE 1 TABLET BY MOUTH EVERY DAY, Disp: 30 tablet, Rfl: 5  •  vitamin D (ERGOCALCIFEROL) 1.25 MG (88103 UT) capsule capsule, Take 50,000 Units by mouth 1 (One) Time Per Week. Mondays, Disp: , Rfl:   •  tiZANidine (ZANAFLEX) 4 MG tablet, Take 1 tablet by mouth Every 6 (Six) Hours As Needed., Disp: , Rfl:   •  traMADol (ULTRAM) 50 MG tablet, tramadol 50 mg tablet, Disp: , Rfl:    SOCIAL Hx:  [unfilled]    The following portions of the patient's history were reviewed and updated as appropriate: allergies, current medications, past family history, past medical history, past social history, past surgical history and problem list.    Review of Systems      Urinary incontinence assessment discussed      Except as outlined in history of physical illness, patient denies any changes in her GYN, , GI systems.  All other systems reviewed are negative         Objective   Physical Exam    /74   Ht 160 cm (63\")   Wt 101 kg (223 lb)   BMI 39.50 kg/m²     General: Patient is alert and oriented and appears overall healthy  Neck: Is supple without thyromegaly, no carotid bruits and no lymphadenopathy  Lungs: Clear bilaterally, no wheezing, rhonchi, or rales.  Respiratory rate is normal  Breast: Even symmetrical, no lymphadenopathy, no retraction, no discharge ,no masses , lumps appreciated on either side  Heart: Regular rate and rhythm are appreciated, no murmurs or rubs are heard  Abdomen: Is soft, without organomegaly, bowel sounds are positive, there is no rebound or guarding and palpation does not produce any discomfort  Back: Nontender without CVA tenderness  Pelvic: External genitalia appear normal and consistent with mature female.  BUS normal                Urethra appears normal and without mass, bladder is nontender and without any " lesions                        Urethral meatus is normal without scarring tenderness or masses                 Bladder is without tenderness or fullness                           Vagina is clean dry without discharge and , no lesions or masses are present                         Cervix is noninflamed without discharge or lesions.  There is no cervical motion tenderness.                Uterus is nonenlarged, without tenderness, and no masses or abnormalities are  present               Adnexa are non-enlarged, non tender               Rectal digital  exam reveals, external hemorrhoids, and adequate sphincter tone and no masses or lesions are appreciated on digital rectal examination.       Patient Active Problem List   Diagnosis   • Fibromyalgia   • Chronic depression   • Acquired hypothyroidism   • High cholesterol   • Vitamin D deficiency   • Antinuclear factor positive   • Gait instability   • Dyslipidemia   • Pain   • Autonomic neuropathy   • Intractable migraine with aura without status migrainosus   • Small fiber neuropathy   • Chronic bilateral low back pain without sciatica   • Chronic right shoulder pain   • Chest pain   • Lumbar spondylolysis   • Bilateral hand pain   • Dizziness   • Pelvic pain in female   • Uterine tenderness   • Renal stone   • Cervical lesion   • Morbidly obese (HCC)   • Ovarian mass, left   • Acute laryngitis   • Acute upper respiratory infection   • Seasonal allergic rhinitis   • Anxiety   • Body mass index (BMI) 38.0-38.9, adult   • Cervical radiculopathy   • Claustrophobia   • Closed fracture of fifth metatarsal bone   • Constipation   • Contact with and (suspected) exposure to other viral communicable diseases   • COVID-19   • Cyst of Bartholin's gland duct   • Diverticulosis of colon   • Dysphagia   • Foot pain   • Gastritis   • Hematochezia   • Hemorrhoids   • Herpes zoster   • History of palpitations   • History of renal calculi   • Hyperplastic polyp of large intestine   •  Influenza-like symptoms   • Memory loss   • Menopause   • Migraine headache   • Obstructive sleep apnea   • Osteoarthritis of multiple joints   • Other enthesopathies, not elsewhere classified   • Other long term (current) drug therapy   • Other specified conditions associated with female genital organs and menstrual cycle   • Parkinson's disease (HCC)   • Personal history of COVID-19   • History of malignant neoplasm of cervix   • Right upper quadrant pain   • Rosacea   • Sciatica   • Seasonal allergies   • Sinusitis   • Sjogren's syndrome (HCC)   • Subcutaneous mass of right lower extremity   • Tremor   • Urge incontinence of urine   • Urinary incontinence   • Verruca vulgaris   • Visual hallucination   • Abdominal wall pain in right upper quadrant   • Acute bronchitis   • Diverticulosis   • H/O: hypothyroidism   • History of osteoarthritis   • H/O gastroesophageal reflux (GERD)   • H/O urinary incontinence   • Lumbar spondylosis   • Lumbosacral spondylosis without myelopathy                Assessment & Plan   Diagnoses and all orders for this visit:    1. Well woman exam with routine gynecological exam (Primary)  -     IGP, Apt HPV,rfx 16 / 18,45    2. Breast cancer screening by mammogram  -     Mammo Screening Digital Tomosynthesis Bilateral With CAD; Future    3. Screening for cervical cancer    4. Encounter for annual physical exam    5. Atrophic vaginitis    6. Autonomic neuropathy    7. Body mass index (BMI) 38.0-38.9, adult    8. Hemorrhoids, unspecified hemorrhoid type      Discussed today's findings and concerns with patient.  Continue to recommend regular exercise including cardiovascular and resistance training as well as  breast self-exam. Wellness lab, mammography, & pap smear, in accordance with age guidelines.    I have encouraged her to call for today's test results if she has not received them within 10 days.  Patient is advised to call with any change in her condition or with any other questions,  otherwise return  for annual examination.

## 2023-02-14 NOTE — DISCHARGE INSTRUCTIONS
[facility] - Post-procedure Instructions          --  While there are no absolute restrictions, it is recommended that you do not perform strenuous activity today. In the morning, you may resume your level of activity as before your block.    --  If you have a band-aid at your injection site, please remove it later today. Observe the area for any redness, swelling, pus-like drainage, or a temperature over 101°. If any of these symptoms occur, please call your doctor at [phone]. If after office hours, leave a message and the on-call provider will return your call.    --  Ice may be applied to your injection site. It is recommended you avoid direct heat (heating pad; hot tub) for 1-2 days.    --  Call [facility] at [phone] if you experience persistent headache, persistent bleeding from the injection site, or severe pain not relieved by heat or oral medication.    --  Do not make important decisions today.    --  Due to the effects of the block and/or the I.V. Sedation, DO NOT drive or operate hazardous machinery for 12 hours.  Local anesthetics may cause numbness after procedure and precautions must be taken with regards to operating equipment as well as with walking, even if ambulating with assistance of another person or with an assistive device.    --  Do not drink alcohol for 12 hours.    -- You may return to work tomorrow, or as directed by your referring doctor.    --  Occasionally you may notice a slight increase in your pain after the procedure. This should start to improve within the next 24-48 hours. Radiofrequency ablation procedure pain may last 3-4 weeks.    --  It may take as long as 3-4 days before you notice a gradual improvement in your pain and/or other symptoms.    -- You may continue to take your prescribed pain medication as needed.    --  Some normal possible side effects of steroid use could include fluid retention, increased blood sugar, dull headache,  "increased sweating, increased appetite, mood swings and flushing.    --  Diabetics are recommended to watch their blood glucose level closely for 24-48 hours after the injection.    --  Must stay in PACU for 20 min upon arrival and prove no leg weakness before being discharged.    --  IN THE EVENT OF A LIFE THREATENING EMERGENCY, (CHEST PAIN, BREATHING DIFFICULTIES, PARALYSIS…) YOU SHOULD GO TO YOUR NEAREST EMERGENCY ROOM.    --  You should be contacted by our office within 2-3 days to schedule follow up or next appointment date.  If not contacted within 7 days, please call the office at [phone]    Radiofrequency ablation (RFA):     - Radiofrequency ablation is a term used to describe cauterization or \"burning.\"   - In pain management, we can use this technique with a special needle to target and destroy areas that are causing your pain.   - In most cases, you must have TWO successful \"test injections\" before you are a candidate for RFA.    After your RFA:   - Because heat is used in this technique, it is common to have soreness after the procedure.  Sometimes \"neuritis\" occurs, which feels like tingling, prickly, or sunburn under the skin sensations.   - Ice packs are helpful in decreasing this soreness and preventing post-procedure \"neuritis\" pain.  Use an ice pack for 20 minutes at a time at least 3 times the day of and the day after your procedure.   - It is common to have arm/leg numbness or weakness the day of your procedure, but this should wear off by the following day.   - It may take up to 6 weeks to gain full benefit from this procedure.   "

## 2023-02-15 ENCOUNTER — HOSPITAL ENCOUNTER (OUTPATIENT)
Dept: GENERAL RADIOLOGY | Facility: SURGERY CENTER | Age: 63
Setting detail: HOSPITAL OUTPATIENT SURGERY
End: 2023-02-15
Payer: MEDICARE

## 2023-02-15 ENCOUNTER — HOSPITAL ENCOUNTER (OUTPATIENT)
Facility: SURGERY CENTER | Age: 63
Setting detail: HOSPITAL OUTPATIENT SURGERY
Discharge: HOME | End: 2023-02-15
Attending: ANESTHESIOLOGY | Admitting: ANESTHESIOLOGY
Payer: MEDICARE

## 2023-02-15 VITALS
DIASTOLIC BLOOD PRESSURE: 76 MMHG | TEMPERATURE: 97.5 F | BODY MASS INDEX: 39.69 KG/M2 | HEIGHT: 63 IN | OXYGEN SATURATION: 93 % | HEART RATE: 85 BPM | WEIGHT: 224 LBS | SYSTOLIC BLOOD PRESSURE: 115 MMHG | RESPIRATION RATE: 16 BRPM

## 2023-02-15 DIAGNOSIS — Z41.9 SURGERY, ELECTIVE: ICD-10-CM

## 2023-02-15 PROCEDURE — 64636 DESTROY L/S FACET JNT ADDL: CPT | Performed by: ANESTHESIOLOGY

## 2023-02-15 PROCEDURE — 64635 DESTROY LUMB/SAC FACET JNT: CPT | Performed by: ANESTHESIOLOGY

## 2023-02-15 PROCEDURE — 25010000002 FENTANYL CITRATE (PF) 50 MCG/ML SOLUTION: Performed by: ANESTHESIOLOGY

## 2023-02-15 PROCEDURE — 25010000002 MIDAZOLAM PER 1 MG: Performed by: ANESTHESIOLOGY

## 2023-02-15 PROCEDURE — 76000 FLUOROSCOPY <1 HR PHYS/QHP: CPT

## 2023-02-15 RX ORDER — SODIUM CHLORIDE 0.9 % (FLUSH) 0.9 %
10 SYRINGE (ML) INJECTION AS NEEDED
Status: DISCONTINUED | OUTPATIENT
Start: 2023-02-15 | End: 2023-02-15 | Stop reason: HOSPADM

## 2023-02-15 RX ORDER — SODIUM CHLORIDE 0.9 % (FLUSH) 0.9 %
10 SYRINGE (ML) INJECTION EVERY 12 HOURS SCHEDULED
Status: DISCONTINUED | OUTPATIENT
Start: 2023-02-15 | End: 2023-02-15 | Stop reason: HOSPADM

## 2023-02-15 RX ORDER — MIDAZOLAM HYDROCHLORIDE 1 MG/ML
INJECTION INTRAMUSCULAR; INTRAVENOUS AS NEEDED
Status: DISCONTINUED | OUTPATIENT
Start: 2023-02-15 | End: 2023-02-15 | Stop reason: HOSPADM

## 2023-02-15 RX ORDER — FENTANYL CITRATE 50 UG/ML
INJECTION, SOLUTION INTRAMUSCULAR; INTRAVENOUS AS NEEDED
Status: DISCONTINUED | OUTPATIENT
Start: 2023-02-15 | End: 2023-02-15 | Stop reason: HOSPADM

## 2023-02-15 NOTE — OP NOTE
Radiofrequency ablation of right L3-S1  [facility]    PREOPERATIVE DIAGNOSIS:  Lumbar spondylosis without myelopathy   POSTOPERATIVE DIAGNOSIS:  Lumbar spondylosis without myelopathy     PROCEDURES PERFORMED:   Right  lumbar radiofrequency ablation of the medial branches at the transverse processes of L3, L4, L5, and the sacral alar groove to thermally treat these facet joints.  1.  80524 -RT Lumbar radiofrequency ablation 1st level.  2.  40173 -RT Lumbar radiofrequency ablation 2nd level.  3.  33262 -RT Lumbar radiofrequency ablation 3rd level.     INFORMED CONSENT:  In preprocedure discussion with the patient, the risks and complications were discussed prior to starting the procedure and informed consent was obtained.     SURGEON:   Rosie Cuevas MD    INDICATIONS:  The patient presents with chronic lower back pain. The patient underwent 2 lumbar medial branch blocks with diagnostically positive relief. Given the patient’s significant pain relief, it is diagnostic that we have likely found the source of the patient’s pain; therefore, lumbar radiofrequency ablation has been indicated. Due to significant spondylosis in more than 2 levels of facet joints, 3 levels will be treated on this patient.      SEDATION:  Light sedation was used for this procedure which included and Versed 1mg & Fentanyl 50mcg    TIME OF PROCEDURE:  The Interoperative procedure time, after administration of the IV sedative, was 15 minutes.    ANESTHETIC:  Lidocaine 1% for skin infiltration, 2% lidocaine and 0.5% bupivacaine for injection.    STEROID:  None.    DESCRIPTION OF PROCEDURE:  After obtaining informed consent an IV was  started in the preoperative area. The patient was taken to the operating room. The patient was placed in prone position with a pillow under the abdomen. All pressure points were padded. EKG, blood pressure, and pulse oximetry were monitored. The patient was  sedated. The lumbosacral area was  prepped with ChloraPrep and draped in a sterile fashion.     The junction of the right S1 superior articular process and sacral ala was identified in a AP fluoroscopic view. The skin and subcutaneous tissue inferior to the junction was anesthetized with 1% lidocaine. A 20-gauge 150mm RF Abbott needle was then advanced percutaneously through the anesthetized skin tract under fluoroscopic guidance until the non-insulated portion of the needle lie at the junction.  The image was then obliqued towards the right side to maximize visualization of the junctions of the L3, L4, L5 superior articular processes with the transverse processes.  Needle placement was performed as described above until the non-insulated portion of the needles lie at the targeted junctions.  All needle tips were confirmed to be posterior to the neural foramen in the lateral fluoroscopic view. Sensory stimulation was then performed with good stimulation of the back at 0.5V or less at each level. Motor stimulation was performed up to 1.5V at each level producing stimulation of the multifidus muscles of the back and no stimulation of the lower extremity at any level. Each level was then anesthetized with 2% lidocaine prior to treatment with pulsed radiofrequency thermocoagulation at 42 degrees Celsius. Each level was then treated with thermal radiofrequency thermocoagulation at 80 degrees Celsius for 60 seconds in two separate cycles.  Prior to the removal of each needle, a volume of 1 mL of injectate was administered at each site.  The total volume consisted of 1mL of 2% lidocaine and 1mL of 0.5% bupivacaine.    ESTIMATED BLOOD LOSS:  Minimal.    SPECIMENS:  None.    COMPLICATIONS:  None.    TOLERANCE AND DISCHARGE:  The patient tolerated the procedure well. The patient was transported to the recovery area without difficulties. The patient was discharged home under the care of family in stable and satisfactory condition.    PLAN:  1.  The patient was  given our standard instruction sheet.  2.  The patient will resume all medications per the medication reconciliation sheet.  3.  The patient will Return to clinic in 8 wks.

## 2023-02-18 LAB
CYTOLOGIST CVX/VAG CYTO: NORMAL
CYTOLOGY CVX/VAG DOC CYTO: NORMAL
CYTOLOGY CVX/VAG DOC THIN PREP: NORMAL
DX ICD CODE: NORMAL
HIV 1 & 2 AB SER-IMP: NORMAL
HPV I/H RISK 4 DNA CVX QL PROBE+SIG AMP: NEGATIVE
OTHER STN SPEC: NORMAL
STAT OF ADQ CVX/VAG CYTO-IMP: NORMAL

## 2023-02-21 ENCOUNTER — HOSPITAL ENCOUNTER (OUTPATIENT)
Dept: BONE DENSITY | Facility: HOSPITAL | Age: 63
Discharge: HOME | End: 2023-02-21
Admitting: PHYSICIAN ASSISTANT
Payer: MEDICARE

## 2023-02-21 PROCEDURE — 77080 DXA BONE DENSITY AXIAL: CPT

## 2023-02-28 DIAGNOSIS — M47.817 LUMBOSACRAL SPONDYLOSIS WITHOUT MYELOPATHY: Primary | ICD-10-CM

## 2023-02-28 RX ORDER — METHYLPREDNISOLONE 4 MG/1
TABLET ORAL
Qty: 21 TABLET | Refills: 0 | Status: SHIPPED | OUTPATIENT
Start: 2023-02-28

## 2023-05-04 ENCOUNTER — TELEPHONE (OUTPATIENT)
Dept: PAIN MEDICINE | Facility: CLINIC | Age: 63
End: 2023-05-04

## 2023-05-04 DIAGNOSIS — M47.817 LUMBOSACRAL SPONDYLOSIS WITHOUT MYELOPATHY: ICD-10-CM

## 2023-05-04 RX ORDER — ETODOLAC 400 MG/1
400 TABLET, FILM COATED ORAL 2 TIMES DAILY PRN
Qty: 60 TABLET | Refills: 0 | Status: SHIPPED | OUTPATIENT
Start: 2023-05-04

## 2023-05-04 NOTE — TELEPHONE ENCOUNTER
Caller: Gabi Tomas    Relationship to patient: Self    Best call back number: [phone]  Patient is needing: PATIENT IS HAVING PAIN IN HER NECK, SHOULDER, AND LEFT ARM AND WOULD EITHER LIKE A REFILL OF ETODOLAC OR SOMETHING ELSE BASED ON RAGHU MONZON RECOMMENDATION. PATIENT HS 2 PILLS LEFT OF CURRENT RX

## 2023-05-09 ENCOUNTER — OFFICE VISIT (OUTPATIENT)
Dept: PAIN MEDICINE | Facility: CLINIC | Age: 63
End: 2023-05-09
Payer: MEDICARE

## 2023-05-09 VITALS
SYSTOLIC BLOOD PRESSURE: 130 MMHG | HEART RATE: 91 BPM | BODY MASS INDEX: 37.85 KG/M2 | TEMPERATURE: 97.1 F | DIASTOLIC BLOOD PRESSURE: 82 MMHG | WEIGHT: 213.6 LBS | HEIGHT: 63 IN | OXYGEN SATURATION: 93 %

## 2023-05-09 DIAGNOSIS — M54.12 CERVICAL RADICULOPATHY: Primary | ICD-10-CM

## 2023-05-09 DIAGNOSIS — M47.817 LUMBOSACRAL SPONDYLOSIS WITHOUT MYELOPATHY: ICD-10-CM

## 2023-05-09 DIAGNOSIS — M50.30 DDD (DEGENERATIVE DISC DISEASE), CERVICAL: ICD-10-CM

## 2023-05-09 NOTE — PROGRESS NOTES
CHIEF COMPLAINT  F/U back pain-LUMBAR RADIOFREQUENCY ABLATION RIGHT L3-S1 and LEFT L3-S1 left- patient states that she has had 80% improvement since both procedures       Subjective   Gabi Tomas is a 63 y.o. female  who presents to the office for follow-up of procedure.  She completed a lumbar radiofrequency ablation of left L3-S1 on 2/1/2023 and radiofrequency ablation of right L3-S1 on 2/15/2023   performed by Dr. Cuevas for management of low back pain. Patient reports 80% ongoing relief from the procedure.     The patient has noted over the past 2 weeks acute onset of posterior cervical spine pain radiating to the left shoulder and upper extremity terminating at the hand with occasional tingling within the hands and fingers.  She finds significant pain with cervical flexion as well as lateral rotation of the spine.    The patient recently underwent laparoscopic Nissen fundoplication on 4/19/2023.  She is scheduled for postsurgical follow-up with her surgeon later on today.    Pain as a pertains to her lumbar spine is 2/10 as compared to 7/10 for the cervical and upper extremity.    Back Pain  This is a chronic problem. The current episode started more than 1 year ago. The problem occurs constantly. The problem is unchanged. The pain is present in the lumbar spine. The quality of the pain is described as aching. The pain does not radiate. The pain is at a severity of 2/10. The pain is mild. The pain is the same all the time. The symptoms are aggravated by position and standing. Associated symptoms include abdominal pain, headaches, tingling and weakness. Pertinent negatives include no chest pain, dysuria or fever.   Neck Pain   This is a new problem. The current episode started 1 to 4 weeks ago. The problem occurs constantly. The problem has been unchanged. The pain is associated with nothing. The pain is present in the midline and left side. The quality of the pain is described as burning and shooting. The  pain is at a severity of 7/10. The pain is moderate. Nothing aggravates the symptoms. The pain is same all the time. Associated symptoms include headaches, tingling and weakness. Pertinent negatives include no chest pain or fever.        PEG Assessment   What number best describes your pain on average in the past week?9  What number best describes how, during the past week, pain has interfered with your enjoyment of life?10  What number best describes how, during the past week, pain has interfered with your general activity?  10    Review of Pertinent Medical Data ---    PROCEDURE:  XR SPINE LUMBAR 2 OR 3 VW-, XR SPINE CERVICAL 2 OR 3 VW-     HISTORY: Chronic neck and back pain.     COMPARISON: CT abdomen and pelvis 4/26/2019     FINDINGS:       3 views of the cervical spine were obtained. The cervical spine is  visualized from C2 through C6 on the lateral view. There is  straightening of the normal cervical lordosis. There is trace  retrolisthesis of C5 on C6. Vertebral body heights are maintained. There  is moderate disc height loss at C5-6, where there are tiny degenerative  endplate osteophytes. There is mild uncovertebral hypertrophy.     3 views of the lumbar spine were obtained. There is a normal lumbar  lordosis. There is trace retrolisthesis of L2 on L3 and L3 on L4.  Vertebral body heights are maintained. There is at least moderate disc  height loss at L2-3 with lesser degrees of disc height loss at  additional levels. There are small degenerative endplate osteophytes at  L2-3. There is multilevel facet osteoarthropathy. There are surgical  clips in the right upper quadrant.     This report was finalized on 10/12/2022 9:54 AM by Dr. Aye Alvarez M.D.            The following portions of the patient's history were reviewed and updated as appropriate: allergies, current medications, past family history, past medical history, past social history, past surgical history and problem list.    Review of Systems  "  Constitutional: Positive for activity change (decreased) and fatigue. Negative for chills and fever.   HENT: Positive for congestion.    Eyes: Negative for visual disturbance.   Respiratory: Negative for chest tightness and shortness of breath.    Cardiovascular: Negative for chest pain.   Gastrointestinal: Positive for abdominal pain and constipation. Negative for diarrhea.   Genitourinary: Negative for difficulty urinating, dyspareunia and dysuria.   Musculoskeletal: Positive for back pain and neck pain.   Neurological: Positive for dizziness, tingling, weakness, light-headedness and headaches.   Psychiatric/Behavioral: Positive for sleep disturbance. Negative for agitation. The patient is nervous/anxious.      I have reviewed and confirmed the accuracy of the ROS as documented by the MA/LPN/RN INDER [place]     Vitals:    05/09/23 1031   BP: 130/82   Pulse: 91   Temp: 97.1 °F (36.2 °C)   SpO2: 93%   Weight: 96.9 kg (213 lb 9.6 oz)   Height: 160 cm (63\")   PainSc:   7   PainLoc: Arm  Comment: left         Objective   Physical Exam  Vitals and nursing note reviewed. Chaperone present:  PRESENT.   Constitutional:       Appearance: Normal appearance. She is normal weight.   HENT:      Head: Normocephalic.   Pulmonary:      Effort: Pulmonary effort is normal.   Musculoskeletal:      Cervical back: Spasms and tenderness present. Pain with movement, spinous process tenderness and muscular tenderness present. Decreased range of motion.      Lumbar back: Tenderness present.   Skin:     General: Skin is warm and dry.   Neurological:      General: No focal deficit present.      Mental Status: She is alert and oriented to person, place, and time.      Cranial Nerves: Cranial nerves 2-12 are intact.      Sensory: Sensation is intact.      Motor: Tremor (LUE) present.      Gait: Gait abnormal (ANTALGIC GAIT; AMBULATES WITH A CANE).      Deep Tendon Reflexes:      Reflex Scores:       Tricep reflexes are 1+ on the " right side and 1+ on the left side.       Bicep reflexes are 1+ on the right side and 1+ on the left side.       Brachioradialis reflexes are 1+ on the right side and 1+ on the left side.  Psychiatric:         Mood and Affect: Mood normal.         Behavior: Behavior normal.         Thought Content: Thought content normal.         Judgment: Judgment normal.           Assessment & Plan   Diagnoses and all orders for this visit:    1. Cervical radiculopathy (Primary)  -     MRI Cervical Spine Without Contrast; Future    2. DDD (degenerative disc disease), cervical    3. Lumbosacral spondylosis without myelopathy        --- I have discussed with the patient that based on previous cervical spine x-rays as well as her physical exam findings that I recommend proceeding with cervical MRI without contrast for evaluation of disc herniation and/or nerve root impingement.  --- Patient will return to the office in 4 weeks to discuss the MRI  --- Possibly will consider proceeding with Medrol Dosepak versus JUANITA.  The patient will need to be cleared by her surgeon before we intercede with any type of steroids either orally are via injection.              Dictated utilizing Dragon dictation.

## 2023-05-10 DIAGNOSIS — M79.7 FIBROMYALGIA: ICD-10-CM

## 2023-05-10 RX ORDER — GABAPENTIN 800 MG/1
TABLET ORAL
Qty: 90 TABLET | Refills: 1 | Status: SHIPPED | OUTPATIENT
Start: 2023-05-10

## 2023-05-11 DIAGNOSIS — M50.30 DDD (DEGENERATIVE DISC DISEASE), CERVICAL: ICD-10-CM

## 2023-05-11 DIAGNOSIS — M54.12 CERVICAL RADICULOPATHY: Primary | ICD-10-CM

## 2023-05-11 RX ORDER — METHYLPREDNISOLONE 4 MG/1
TABLET ORAL
Qty: 21 TABLET | Refills: 0 | Status: SHIPPED | OUTPATIENT
Start: 2023-05-11

## 2023-05-25 ENCOUNTER — TELEPHONE (OUTPATIENT)
Dept: PAIN MEDICINE | Facility: CLINIC | Age: 63
End: 2023-05-25

## 2023-05-30 NOTE — TELEPHONE ENCOUNTER
Please let patient know that she has bulging discs/degenerative changes most noticeably at C5-6 and C6-7.  At C6-7 she does have more moderate narrowing with a possible bone spur.  Would recommend consideration of JUANITA if symptoms do not improve with the steroid pack.

## 2023-05-30 NOTE — TELEPHONE ENCOUNTER
Please review MRI results which are now on file. Patient called back asking to see if you had review them yet.

## 2023-06-01 ENCOUNTER — PREP FOR SURGERY (OUTPATIENT)
Dept: SURGERY | Facility: SURGERY CENTER | Age: 63
End: 2023-06-01

## 2023-06-01 DIAGNOSIS — M54.12 CERVICAL RADICULOPATHY: Primary | ICD-10-CM

## 2023-06-01 RX ORDER — DIAZEPAM 5 MG/1
5 TABLET ORAL ONCE
OUTPATIENT
Start: 2023-06-01

## 2023-06-02 ENCOUNTER — TRANSCRIBE ORDERS (OUTPATIENT)
Dept: SURGERY | Facility: SURGERY CENTER | Age: 63
End: 2023-06-02
Payer: MEDICARE

## 2023-06-02 DIAGNOSIS — Z41.9 SURGERY, ELECTIVE: Primary | ICD-10-CM

## 2023-06-19 ENCOUNTER — APPOINTMENT (OUTPATIENT)
Dept: GENERAL RADIOLOGY | Facility: SURGERY CENTER | Age: 63
End: 2023-06-19
Payer: MEDICARE

## 2023-07-19 PROBLEM — G89.4 CHRONIC PAIN SYNDROME: Status: ACTIVE | Noted: 2023-07-19

## 2023-07-19 NOTE — H&P (VIEW-ONLY)
CHIEF COMPLAINT  Neck pain and back pain  Patient stated that her pain is now bothering  both arms, L>R, both hands have been a sleep for 3 weeks now, they are numb and tingly     Subjective   Gabi Tomas is a 63 y.o. female  who presents for follow-up.  She has a history of neck and low back pain.  Patient is pleased to report 80% reduction of pain ongoing for her axial low back pain following radiofrequency ablation.  Primary pain complaint at this time continues to be posterior cervical spine pain radiating into the left shoulder and upper extremity terminating within the hand with persistent numbness and tingling affecting bilateral hands and fingers.    Patient does have history of tremors and is scheduled for evaluation with a movement disorder specialist coming up in August.    Pain today 3/10 VAS in severity.      Neck Pain   This is a chronic problem. The current episode started more than 1 month ago. The problem occurs constantly. The problem has been unchanged. The pain is associated with nothing. The pain is present in the midline, right side and left side. The quality of the pain is described as burning, shooting and stabbing. The pain is at a severity of 3/10. The pain is mild. The symptoms are aggravated by position. The pain is Same all the time. Associated symptoms include numbness and tingling.   Back Pain  This is a chronic problem. The current episode started more than 1 year ago. The problem occurs constantly. The problem is unchanged. The pain is present in the lumbar spine. The quality of the pain is described as aching. The pain does not radiate. The pain is at a severity of 3/10. The pain is mild. The pain is Worse during the day. The symptoms are aggravated by position, standing and twisting. Associated symptoms include numbness and tingling.      PEG Assessment   What number best describes your pain on average in the past week?8  What number best describes how, during the past week,  "pain has interfered with your enjoyment of life?10  What number best describes how, during the past week, pain has interfered with your general activity?  10    Review of Pertinent Medical Data ---      The following portions of the patient's history were reviewed and updated as appropriate: allergies, current medications, past family history, past medical history, past social history, past surgical history, and problem list.    Review of Systems   Musculoskeletal:  Positive for back pain.   Neurological:  Positive for tingling and numbness.   I have reviewed and confirmed the accuracy of the ROS as documented by the MA/LPN/RN INDER [place]  Vitals:    07/19/23 0909   BP: 110/80   BP Location: Right arm   Patient Position: Sitting   Cuff Size: Adult   Pulse: 78   Resp: 20   Temp: 97 øF (36.1 øC)   SpO2: 98%   Weight: 94.3 kg (208 lb)   Height: 160 cm (63\")   PainSc:   3   PainLoc: Neck         Objective   Physical Exam  Vitals and nursing note reviewed.   Constitutional:       Appearance: Normal appearance.   HENT:      Head: Normocephalic.   Pulmonary:      Effort: Pulmonary effort is normal.   Musculoskeletal:      Cervical back: Spasms and tenderness present. Pain with movement, spinous process tenderness and muscular tenderness present. Decreased range of motion.   Skin:     General: Skin is warm and dry.   Neurological:      General: No focal deficit present.      Mental Status: She is alert and oriented to person, place, and time.      Cranial Nerves: Cranial nerves 2-12 are intact.      Sensory: Sensation is intact.      Motor: Weakness and tremor present.      Gait: Gait abnormal.   Psychiatric:         Mood and Affect: Mood normal.         Behavior: Behavior normal.         Thought Content: Thought content normal.         Judgment: Judgment normal.           Assessment & Plan   Diagnoses and all orders for this visit:    1. Cervical radiculopathy    2. DDD (degenerative disc disease), cervical    3. " Chronic pain syndrome  -     Ambulatory Referral to Psychology        Gabi Tomas reports a pain score of 3.  Given her pain assessment as noted, treatment options were discussed and the following options were decided upon as a follow-up plan to address the patient's pain: continuation of current treatment plan for pain, patient declined analgesics, referral to specialist for assistance in pain treatment guidance, steroid injections, and use of non-medical modalities (ice, heat, stretching and/or behavior modifications).      --- Based on patient's physical exam and MRI findings I do feel she may benefit from #1 diagnostic C7/T1 JUANITA.  The risk and benefits of the procedure have been discussed with the patient and all of her questions addressed.  --- Follow-up in 4 weeks after injective therapy for further evaluation  --- The patient is currently under the care of her PCP who is prescribing her antidepressant with recent increase made of the duloxetine.  I do feel she would benefit from evaluation with our pain psychologist, Dr. Jonas Estrella, for biofeedback and evaluation for up pain coping skills.  The patient states that she has issues with depression but denies any current or previous suicidality.  Furl has been placed.              Dictated utilizing Dragon dictation.

## 2023-07-21 ENCOUNTER — APPOINTMENT (OUTPATIENT)
Dept: GENERAL RADIOLOGY | Facility: HOSPITAL | Age: 63
End: 2023-07-21
Payer: MEDICARE

## 2023-07-21 ENCOUNTER — HOSPITAL ENCOUNTER (OUTPATIENT)
Facility: HOSPITAL | Age: 63
Setting detail: OBSERVATION
Discharge: HOME | End: 2023-07-24
Attending: EMERGENCY MEDICINE | Admitting: INTERNAL MEDICINE
Payer: MEDICARE

## 2023-07-21 DIAGNOSIS — M79.7 FIBROMYALGIA: ICD-10-CM

## 2023-07-21 DIAGNOSIS — R20.2 PARESTHESIAS: ICD-10-CM

## 2023-07-21 DIAGNOSIS — R26.81 GAIT INSTABILITY: ICD-10-CM

## 2023-07-21 DIAGNOSIS — R55 NEAR SYNCOPE: Primary | ICD-10-CM

## 2023-07-21 DIAGNOSIS — R25.1 TREMOR: ICD-10-CM

## 2023-07-21 DIAGNOSIS — F41.9 ANXIETY: ICD-10-CM

## 2023-07-21 LAB
ALBUMIN SERPL-MCNC: 4.2 G/DL (ref 3.5–5.2)
ALBUMIN/GLOB SERPL: 2.2 G/DL
ALP SERPL-CCNC: 86 U/L (ref 39–117)
ALT SERPL W P-5'-P-CCNC: 27 U/L (ref 1–33)
ANION GAP SERPL CALCULATED.3IONS-SCNC: 11.2 MMOL/L (ref 5–15)
AST SERPL-CCNC: 26 U/L (ref 1–32)
BASOPHILS # BLD AUTO: 0.05 10*3/MM3 (ref 0–0.2)
BASOPHILS NFR BLD AUTO: 0.8 % (ref 0–1.5)
BILIRUB SERPL-MCNC: 0.5 MG/DL (ref 0–1.2)
BUN SERPL-MCNC: 12 MG/DL (ref 8–23)
BUN/CREAT SERPL: 15.2 (ref 7–25)
CALCIUM SPEC-SCNC: 9.3 MG/DL (ref 8.6–10.5)
CHLORIDE SERPL-SCNC: 108 MMOL/L (ref 98–107)
CO2 SERPL-SCNC: 23.8 MMOL/L (ref 22–29)
CREAT SERPL-MCNC: 0.79 MG/DL (ref 0.57–1)
DEPRECATED RDW RBC AUTO: 41.9 FL (ref 37–54)
EGFRCR SERPLBLD CKD-EPI 2021: 85.2 ML/MIN/1.73
EOSINOPHIL # BLD AUTO: 0.17 10*3/MM3 (ref 0–0.4)
EOSINOPHIL NFR BLD AUTO: 2.9 % (ref 0.3–6.2)
ERYTHROCYTE [DISTWIDTH] IN BLOOD BY AUTOMATED COUNT: 13.2 % (ref 12.3–15.4)
GLOBULIN UR ELPH-MCNC: 1.9 GM/DL
GLUCOSE SERPL-MCNC: 99 MG/DL (ref 65–99)
HCT VFR BLD AUTO: 44.3 % (ref 34–46.6)
HGB BLD-MCNC: 15 G/DL (ref 12–15.9)
IMM GRANULOCYTES # BLD AUTO: 0.03 10*3/MM3 (ref 0–0.05)
IMM GRANULOCYTES NFR BLD AUTO: 0.5 % (ref 0–0.5)
LYMPHOCYTES # BLD AUTO: 2 10*3/MM3 (ref 0.7–3.1)
LYMPHOCYTES NFR BLD AUTO: 33.6 % (ref 19.6–45.3)
MCH RBC QN AUTO: 29.8 PG (ref 26.6–33)
MCHC RBC AUTO-ENTMCNC: 33.9 G/DL (ref 31.5–35.7)
MCV RBC AUTO: 88.1 FL (ref 79–97)
MONOCYTES # BLD AUTO: 0.46 10*3/MM3 (ref 0.1–0.9)
MONOCYTES NFR BLD AUTO: 7.7 % (ref 5–12)
NEUTROPHILS NFR BLD AUTO: 3.24 10*3/MM3 (ref 1.7–7)
NEUTROPHILS NFR BLD AUTO: 54.5 % (ref 42.7–76)
NRBC BLD AUTO-RTO: 0 /100 WBC (ref 0–0.2)
PLATELET # BLD AUTO: 264 10*3/MM3 (ref 140–450)
PMV BLD AUTO: 10.6 FL (ref 6–12)
POTASSIUM SERPL-SCNC: 4.1 MMOL/L (ref 3.5–5.2)
PROT SERPL-MCNC: 6.1 G/DL (ref 6–8.5)
QT INTERVAL: 381 MS
RBC # BLD AUTO: 5.03 10*6/MM3 (ref 3.77–5.28)
SODIUM SERPL-SCNC: 143 MMOL/L (ref 136–145)
TROPONIN T SERPL HS-MCNC: 10 NG/L
WBC NRBC COR # BLD: 5.95 10*3/MM3 (ref 3.4–10.8)

## 2023-07-21 PROCEDURE — 84484 ASSAY OF TROPONIN QUANT: CPT | Performed by: EMERGENCY MEDICINE

## 2023-07-21 PROCEDURE — 84443 ASSAY THYROID STIM HORMONE: CPT | Performed by: INTERNAL MEDICINE

## 2023-07-21 PROCEDURE — 80053 COMPREHEN METABOLIC PANEL: CPT | Performed by: EMERGENCY MEDICINE

## 2023-07-21 PROCEDURE — G0378 HOSPITAL OBSERVATION PER HR: HCPCS

## 2023-07-21 PROCEDURE — 99284 EMERGENCY DEPT VISIT MOD MDM: CPT

## 2023-07-21 PROCEDURE — 71046 X-RAY EXAM CHEST 2 VIEWS: CPT

## 2023-07-21 PROCEDURE — 85025 COMPLETE CBC W/AUTO DIFF WBC: CPT | Performed by: EMERGENCY MEDICINE

## 2023-07-21 PROCEDURE — 93005 ELECTROCARDIOGRAM TRACING: CPT | Performed by: EMERGENCY MEDICINE

## 2023-07-21 PROCEDURE — 93010 ELECTROCARDIOGRAM REPORT: CPT | Performed by: INTERNAL MEDICINE

## 2023-07-21 RX ORDER — FLUTICASONE PROPIONATE 50 MCG
2 SPRAY, SUSPENSION (ML) NASAL DAILY
Status: DISCONTINUED | OUTPATIENT
Start: 2023-07-22 | End: 2023-07-24 | Stop reason: HOSPADM

## 2023-07-21 RX ORDER — ONDANSETRON 2 MG/ML
4 INJECTION INTRAMUSCULAR; INTRAVENOUS EVERY 6 HOURS PRN
Status: DISCONTINUED | OUTPATIENT
Start: 2023-07-21 | End: 2023-07-24 | Stop reason: HOSPADM

## 2023-07-21 RX ORDER — MONTELUKAST SODIUM 10 MG/1
10 TABLET ORAL DAILY
Status: DISCONTINUED | OUTPATIENT
Start: 2023-07-22 | End: 2023-07-24 | Stop reason: HOSPADM

## 2023-07-21 RX ORDER — DULOXETIN HYDROCHLORIDE 30 MG/1
30 CAPSULE, DELAYED RELEASE ORAL DAILY
COMMUNITY
End: 2023-07-24 | Stop reason: HOSPADM

## 2023-07-21 RX ORDER — CHOLECALCIFEROL (VITAMIN D3) 125 MCG
1000 CAPSULE ORAL DAILY
Status: DISCONTINUED | OUTPATIENT
Start: 2023-07-22 | End: 2023-07-24 | Stop reason: HOSPADM

## 2023-07-21 RX ORDER — ENOXAPARIN SODIUM 100 MG/ML
40 INJECTION SUBCUTANEOUS EVERY 24 HOURS
Status: DISCONTINUED | OUTPATIENT
Start: 2023-07-22 | End: 2023-07-24 | Stop reason: HOSPADM

## 2023-07-21 RX ORDER — DULOXETIN HYDROCHLORIDE 60 MG/1
60 CAPSULE, DELAYED RELEASE ORAL 2 TIMES DAILY
Status: DISCONTINUED | OUTPATIENT
Start: 2023-07-22 | End: 2023-07-24 | Stop reason: HOSPADM

## 2023-07-21 RX ORDER — ALPRAZOLAM 0.5 MG/1
0.5 TABLET ORAL 2 TIMES DAILY PRN
Status: DISCONTINUED | OUTPATIENT
Start: 2023-07-21 | End: 2023-07-23

## 2023-07-21 RX ORDER — SODIUM CHLORIDE 0.9 % (FLUSH) 0.9 %
10 SYRINGE (ML) INJECTION AS NEEDED
Status: DISCONTINUED | OUTPATIENT
Start: 2023-07-21 | End: 2023-07-24 | Stop reason: HOSPADM

## 2023-07-21 RX ORDER — GABAPENTIN 400 MG/1
800 CAPSULE ORAL EVERY 8 HOURS SCHEDULED
Status: DISCONTINUED | OUTPATIENT
Start: 2023-07-22 | End: 2023-07-22

## 2023-07-21 NOTE — ED NOTES
Pt was told by pain mgmnt and pmd that they were uinable to fell a pulse in her left arm.  They have referred her to a vascular but she doesn't want to wait.  She has been feeling dizzy

## 2023-07-21 NOTE — ED PROVIDER NOTES
EMERGENCY DEPARTMENT ENCOUNTER    Room Number:  32/32  PCP: Carrillo Drake MD  Historian: Patient      HPI:  Chief Complaint: Lightheadedness tingling  A complete HPI/ROS/PMH/PSH/SH/FH are unobtainable due to: None  Context: Gabi Tomas is a 63 y.o. female who presents to the ED c/o lightheadedness.  Patient states she has been having tingling in both hands and lightheadedness for few days.  States she has felt somewhat confused.  Has had history of postural hypotension and states has felt like passing out.  Patient also has been told she has decreased pulse in her left arm and they are concerned about a blood clot.  Patient has had no vomiting or diarrhea.  No black stools or dark tarry stools            PAST MEDICAL HISTORY  Active Ambulatory Problems     Diagnosis Date Noted    Fibromyalgia 04/22/2016    Chronic depression 03/06/2014    Acquired hypothyroidism 04/22/2016    High cholesterol 03/06/2014    Vitamin D deficiency 04/22/2016    Antinuclear factor positive 08/28/2014    Gait instability 08/28/2014    Dyslipidemia 08/28/2014    Pain 06/06/2014    Autonomic neuropathy 05/16/2016    Intractable migraine with aura without status migrainosus 05/16/2016    Small fiber neuropathy 06/28/2016    Chronic bilateral low back pain without sciatica 12/08/2016    Chronic right shoulder pain 12/08/2016    Chest pain 03/23/2017    Lumbar spondylolysis 05/22/2017    Bilateral hand pain 10/12/2017    Dizziness 03/23/2018    Pelvic pain in female 03/28/2019    Uterine tenderness 03/28/2019    Renal stone 04/01/2019    Cervical lesion 04/01/2019    Morbidly obese 04/23/2019    Ovarian mass, left 05/15/2019    Acute laryngitis 11/01/2018    Acute upper respiratory infection 10/25/2018    Seasonal allergic rhinitis 04/24/2014    Anxiety 08/03/2016    Body mass index (BMI) 38.0-38.9, adult 02/07/2022    Cervical radiculopathy 02/18/2022    Claustrophobia 05/19/2014    Closed fracture of fifth metatarsal bone  12/02/2014    Constipation 01/10/2017    Contact with and (suspected) exposure to other viral communicable diseases 08/17/2020    COVID-19 08/17/2021    Cyst of Bartholin's gland duct 10/08/2019    Diverticulosis of colon 05/16/2014    Dysphagia 03/05/2014    Foot pain 06/21/2016    Gastritis 09/12/2014    Hematochezia 03/20/2014    Hemorrhoids 03/20/2014    Herpes zoster 12/18/2015    History of palpitations 08/17/2021    History of renal calculi 10/18/2019    Hyperplastic polyp of large intestine 05/16/2014    Influenza-like symptoms 02/01/2018    Memory loss 03/20/2014    Menopause 09/29/2014    Migraine headache 01/01/1998    Obstructive sleep apnea 09/12/2014    Osteoarthritis of multiple joints 11/11/2015    Other enthesopathies, not elsewhere classified 02/07/2022    Other long term (current) drug therapy 02/07/2022    Other specified conditions associated with female genital organs and menstrual cycle 03/28/2019    Parkinson's disease 02/07/2022    Personal history of COVID-19 02/07/2022    History of malignant neoplasm of cervix 06/06/2019    Right upper quadrant pain 03/20/2014    Rosacea 03/28/2016    Sciatica 02/01/2016    Seasonal allergies 09/12/2014    Sinusitis 09/11/2018    Sjogren's syndrome 08/03/2016    Subcutaneous mass of right lower extremity 08/28/2020    Tremor 03/20/2014    Urge incontinence of urine 04/17/2014    Urinary incontinence 03/20/2014    Verruca vulgaris 09/29/2015    Visual hallucination 09/11/2018    Abdominal wall pain in right upper quadrant 06/10/2022    Acute bronchitis 06/10/2022    Diverticulosis 06/10/2022    H/O: hypothyroidism 06/10/2022    History of osteoarthritis 06/10/2022    H/O gastroesophageal reflux (GERD) 06/10/2022    H/O urinary incontinence 08/17/2021    Lumbar spondylosis 11/18/2022    Lumbosacral spondylosis without myelopathy 11/18/2022    DDD (degenerative disc disease), cervical 05/09/2023    Chronic pain syndrome 07/19/2023     Resolved Ambulatory  Problems     Diagnosis Date Noted    PMB (postmenopausal bleeding) 2019     Past Medical History:   Diagnosis Date    Allergic     Anemia     Arthritis     Cancer     Cervical dysplasia     Cervical mass     Depression     Difficulty walking     Eating disorder     Environmental allergies     Fibromyalgia, primary     GERD (gastroesophageal reflux disease)     Headache     Hyperlipidemia     Hypothyroidism     Kidney stone     Low back pain     Migraine     Obesity     Orthostatic hypertension     Peptic ulceration     Peripheral neuropathy     Shingles     Sleep apnea     Syncope     Urinary tract infection     Visual impairment          PAST SURGICAL HISTORY  Past Surgical History:   Procedure Laterality Date    CERVICAL CONIZATION       SECTION PRIMARY      CHOLECYSTECTOMY      DILATATION AND CURETTAGE      HIATAL HERNIA REPAIR  2023    MEDIAL BRANCH BLOCK Bilateral 2022    Procedure: LUMBAR MEDIAL BRANCH BLOCK Bilateral ~L3-S1;  Surgeon: Rosie Cuevas MD;  Location: [facility];  Service: Pain Management;  Laterality: Bilateral;    MEDIAL BRANCH BLOCK Bilateral 2022    Procedure: LUMBAR MEDIAL BRANCH BLOCK BILATERAL L3-S1 #2;  Surgeon: Rosie Cuevas MD;  Location: [facility];  Service: Pain Management;  Laterality: Bilateral;    NERVE SURGERY Left 2023    Procedure: LUMBAR RADIOFREQUENCY ABLATION LEFT L3-S1;  Surgeon: Rosie Cuevas MD;  Location: [facility];  Service: Pain Management;  Laterality: Left;    NERVE SURGERY Right 02/15/2023    Procedure: LUMBAR RADIOFREQUENCY ABLATION RIGHT L3-S1;  Surgeon: Rosie Cuevas MD;  Location: [facility];  Service: Pain Management;  Laterality: Right;    NISSEN FUNDOPLICATION LAPAROSCOPIC  2023         FAMILY HISTORY  Family History   Problem Relation Age of Onset    Lymphoma Mother     Depression Mother     Migraines Mother     Cancer Father     Heart attack Father     Psoriasis Father     Alcohol  abuse Father     Aneurysm Brother     COPD Brother     Heart disease Brother     Heart attack Brother     Migraines Son     Cancer Maternal Aunt     Cancer Maternal Uncle     Cancer Paternal Aunt     Heart disease Paternal Aunt     Cancer Paternal Uncle     Heart disease Paternal Uncle     Aneurysm Brother     Lung disease Brother     Alcohol abuse Brother     ADD / ADHD Son     Migraines Son          SOCIAL HISTORY  Social History     Socioeconomic History    Marital status:    Tobacco Use    Smoking status: Some Days     Packs/day: 0.25     Years: 42.00     Pack years: 10.50     Types: Cigarettes    Smokeless tobacco: Never   Vaping Use    Vaping Use: Never used   Substance and Sexual Activity    Alcohol use: Yes     Comment: rare    Drug use: No    Sexual activity: Defer         ALLERGIES  Ampicillin, Cyclobenzaprine, Levofloxacin, Milnacipran, Quinolones, Savella [milnacipran hcl], Sulfa antibiotics, and Pregabalin        REVIEW OF SYSTEMS  Review of Systems   Lightheadedness      PHYSICAL EXAM  ED Triage Vitals [07/21/23 1900]   Temp Heart Rate Resp BP SpO2   97.8 °F (36.6 °C) 85 16 -- 94 %      Temp src Heart Rate Source Patient Position BP Location FiO2 (%)   Tympanic Monitor -- -- --       Physical Exam      GENERAL: no acute distress  HENT: nares patent  EYES: no scleral icterus  CV: regular rhythm, normal rate  RESPIRATORY: normal effort  ABDOMEN: soft  MUSCULOSKELETAL: no deformity.  Patient does have distal pulse left arm that is palpable and dopplerable  NEURO: alert, moves all extremities, follows commands  PSYCH:  calm, cooperative  SKIN: warm, dry    Vital signs and nursing notes reviewed.          LAB RESULTS  Recent Results (from the past 24 hour(s))   ECG 12 Lead Syncope    Collection Time: 07/21/23  7:31 PM   Result Value Ref Range    QT Interval 381 ms   Comprehensive Metabolic Panel    Collection Time: 07/21/23  8:03 PM    Specimen: Blood   Result Value Ref Range    Glucose 99 65 - 99  mg/dL    BUN 12 8 - 23 mg/dL    Creatinine 0.79 0.57 - 1.00 mg/dL    Sodium 143 136 - 145 mmol/L    Potassium 4.1 3.5 - 5.2 mmol/L    Chloride 108 (H) 98 - 107 mmol/L    CO2 23.8 22.0 - 29.0 mmol/L    Calcium 9.3 8.6 - 10.5 mg/dL    Total Protein 6.1 6.0 - 8.5 g/dL    Albumin 4.2 3.5 - 5.2 g/dL    ALT (SGPT) 27 1 - 33 U/L    AST (SGOT) 26 1 - 32 U/L    Alkaline Phosphatase 86 39 - 117 U/L    Total Bilirubin 0.5 0.0 - 1.2 mg/dL    Globulin 1.9 gm/dL    A/G Ratio 2.2 g/dL    BUN/Creatinine Ratio 15.2 7.0 - 25.0    Anion Gap 11.2 5.0 - 15.0 mmol/L    eGFR 85.2 >60.0 mL/min/1.73   CBC Auto Differential    Collection Time: 07/21/23  8:03 PM    Specimen: Blood   Result Value Ref Range    WBC 5.95 3.40 - 10.80 10*3/mm3    RBC 5.03 3.77 - 5.28 10*6/mm3    Hemoglobin 15.0 12.0 - 15.9 g/dL    Hematocrit 44.3 34.0 - 46.6 %    MCV 88.1 79.0 - 97.0 fL    MCH 29.8 26.6 - 33.0 pg    MCHC 33.9 31.5 - 35.7 g/dL    RDW 13.2 12.3 - 15.4 %    RDW-SD 41.9 37.0 - 54.0 fl    MPV 10.6 6.0 - 12.0 fL    Platelets 264 140 - 450 10*3/mm3    Neutrophil % 54.5 42.7 - 76.0 %    Lymphocyte % 33.6 19.6 - 45.3 %    Monocyte % 7.7 5.0 - 12.0 %    Eosinophil % 2.9 0.3 - 6.2 %    Basophil % 0.8 0.0 - 1.5 %    Immature Grans % 0.5 0.0 - 0.5 %    Neutrophils, Absolute 3.24 1.70 - 7.00 10*3/mm3    Lymphocytes, Absolute 2.00 0.70 - 3.10 10*3/mm3    Monocytes, Absolute 0.46 0.10 - 0.90 10*3/mm3    Eosinophils, Absolute 0.17 0.00 - 0.40 10*3/mm3    Basophils, Absolute 0.05 0.00 - 0.20 10*3/mm3    Immature Grans, Absolute 0.03 0.00 - 0.05 10*3/mm3    nRBC 0.0 0.0 - 0.2 /100 WBC   Single High Sensitivity Troponin T    Collection Time: 07/21/23  8:03 PM    Specimen: Blood   Result Value Ref Range    HS Troponin T 10 (H) <10 ng/L       Ordered the above labs and reviewed the results.        RADIOLOGY  XR Chest 2 View    Result Date: 7/21/2023  TWO VIEWS OF THE CHEST  HISTORY: 63-year-old female with a history of chest pain.  FINDINGS: PA and lateral chest  radiographs were obtained. Comparison is made to a chest radiograph dated 12/22/2022. The lungs are normal in volume and are clear of consolidation. Mild subpleural interstitial scarring is seen at the base of the left lung. The cardiomediastinal silhouette and osseous structures appear normal.      There is no evidence for an active or acute abnormality of the chest.  This report was finalized on 7/21/2023 8:59 PM by Dr. Martir Reza M.D.       Ordered the above noted radiological studies.  Chest x-ray independently interpreted by me and shows no evidence of pneumonia          PROCEDURES  Procedures    EKG          EKG time: 1931  Rhythm/Rate: Normal sinus rhythm 71  P waves and [place]: Normal P waves  QRS, axis: Normal QRS  ST and T waves: Normal ST-T wave    Interpreted Contemporaneously by me, independently viewed  Unchanged compared to prior 12/22/2022           MEDICATIONS GIVEN IN ER  Medications   sodium chloride 0.9 % flush 10 mL (has no administration in time range)                   MEDICAL DECISION MAKING, PROGRESS, and CONSULTS    Discussion below represents my analysis of pertinent findings related to patient's condition, differential diagnosis, treatment plan and final disposition.      Additional sources:  - Discussed/ obtained information from independent historians: None    - External (non-ED) record review: Epic reviewed and patient seen by primary provider yesterday in the office for cervical radiculopathy    - Chronic or social conditions impacting care: None    - Shared decision making: None      Orders placed during this visit:  Orders Placed This Encounter   Procedures    XR Chest 2 View    Comprehensive Metabolic Panel    CBC Auto Differential    Single High Sensitivity Troponin T    Family Medicine Consult    ECG 12 Lead Syncope    Insert Peripheral IV    Initiate Observation Status    CBC & Differential         Additional orders considered but not ordered:  None        Differential  diagnosis includes but is not limited to:    Vasovagal syncope versus cardiac syncope versus orthostatic syncope      Independent interpretation of labs, radiology studies, and discussions with consultants:  ED Course as of 07/21/23 2104 Fri Jul 21, 2023 2101 21:01 EDT  Patient with dizziness and near syncopal multiple times.  States this is significantly worse than it has been in the past.  Patient's work-up has been negative.  Patient also concerned about her left arm having decreased pulse.  Patient's chest x-ray normal so doubt dissection.  Patient has palpable and dopplerable pulse in the left arm the hand is warm with normal cap refill.  Patient has been discussed with Dr. Albright who will admit. [SL]      ED Course User Index  [SL] Carlos Ferguson MD                 DIAGNOSIS  Final diagnoses:   Near syncope   Paresthesias         DISPOSITION  admit            Latest Documented Vital Signs:  As of 21:04 EDT  BP- 130/86 HR- 71 Temp- 97.8 °F (36.6 °C) (Tympanic) O2 sat- 93%              --    Please note that portions of this were completed with a voice recognition program.       Note Disclaimer: At [facility], we believe that sharing information builds trust and better relationships. You are receiving this note because you are receiving care at [facility] or recently visited. It is possible you will see health information before a provider has talked with you about it. This kind of information can be easy to misunderstand. To help you fully understand what it means for your health, we urge you to discuss this note with your provider.            Carlos Ferguson MD  07/21/23 2106

## 2023-07-22 ENCOUNTER — APPOINTMENT (OUTPATIENT)
Dept: CARDIOLOGY | Facility: HOSPITAL | Age: 63
End: 2023-07-22
Payer: MEDICARE

## 2023-07-22 LAB
ANION GAP SERPL CALCULATED.3IONS-SCNC: 11.3 MMOL/L (ref 5–15)
AORTIC ARCH: 2.3 CM
ASCENDING AORTA: 3.4 CM
BH CV ECHO MEAS - ACS: 2.07 CM
BH CV ECHO MEAS - AO MAX PG: 6 MMHG
BH CV ECHO MEAS - AO MEAN PG: 3.7 MMHG
BH CV ECHO MEAS - AO ROOT DIAM: 2.8 CM
BH CV ECHO MEAS - AO V2 MAX: 122.5 CM/SEC
BH CV ECHO MEAS - AO V2 VTI: 19.7 CM
BH CV ECHO MEAS - AVA(I,D): 2.25 CM2
BH CV ECHO MEAS - EDV(CUBED): 91.1 ML
BH CV ECHO MEAS - EDV(MOD-SP2): 41 ML
BH CV ECHO MEAS - EDV(MOD-SP4): 48 ML
BH CV ECHO MEAS - EF(MOD-BP): 65.9 %
BH CV ECHO MEAS - EF(MOD-SP2): 65.9 %
BH CV ECHO MEAS - EF(MOD-SP4): 66.7 %
BH CV ECHO MEAS - ESV(CUBED): 26.6 ML
BH CV ECHO MEAS - ESV(MOD-SP2): 14 ML
BH CV ECHO MEAS - ESV(MOD-SP4): 16 ML
BH CV ECHO MEAS - FS: 33.6 %
BH CV ECHO MEAS - IVS/LVPW: 1.02 CM
BH CV ECHO MEAS - IVSD: 0.88 CM
BH CV ECHO MEAS - LAT PEAK E' VEL: 7.2 CM/SEC
BH CV ECHO MEAS - LV DIASTOLIC VOL/BSA (35-75): 24.4 CM2
BH CV ECHO MEAS - LV MASS(C)D: 127.8 GRAMS
BH CV ECHO MEAS - LV MAX PG: 3.5 MMHG
BH CV ECHO MEAS - LV MEAN PG: 1.94 MMHG
BH CV ECHO MEAS - LV SYSTOLIC VOL/BSA (12-30): 8.1 CM2
BH CV ECHO MEAS - LV V1 MAX: 93 CM/SEC
BH CV ECHO MEAS - LV V1 VTI: 16.4 CM
BH CV ECHO MEAS - LVIDD: 4.5 CM
BH CV ECHO MEAS - LVIDS: 3 CM
BH CV ECHO MEAS - LVOT AREA: 2.7 CM2
BH CV ECHO MEAS - LVOT DIAM: 1.85 CM
BH CV ECHO MEAS - LVPWD: 0.87 CM
BH CV ECHO MEAS - MED PEAK E' VEL: 5.2 CM/SEC
BH CV ECHO MEAS - MV A DUR: 0.16 SEC
BH CV ECHO MEAS - MV A MAX VEL: 81 CM/SEC
BH CV ECHO MEAS - MV DEC SLOPE: 406.9 CM/SEC2
BH CV ECHO MEAS - MV DEC TIME: 0.26 MSEC
BH CV ECHO MEAS - MV E MAX VEL: 72.7 CM/SEC
BH CV ECHO MEAS - MV E/A: 0.9
BH CV ECHO MEAS - MV MAX PG: 2.34 MMHG
BH CV ECHO MEAS - MV MEAN PG: 1.15 MMHG
BH CV ECHO MEAS - MV P1/2T: 56.9 MSEC
BH CV ECHO MEAS - MV V2 VTI: 22.1 CM
BH CV ECHO MEAS - MVA(P1/2T): 3.9 CM2
BH CV ECHO MEAS - MVA(VTI): 2.01 CM2
BH CV ECHO MEAS - PA ACC TIME: 0.16 SEC
BH CV ECHO MEAS - PA V2 MAX: 93.8 CM/SEC
BH CV ECHO MEAS - QP/QS: 1.7
BH CV ECHO MEAS - RV MAX PG: 3.2 MMHG
BH CV ECHO MEAS - RV V1 MAX: 89.7 CM/SEC
BH CV ECHO MEAS - RV V1 VTI: 20.8 CM
BH CV ECHO MEAS - RVOT DIAM: 2.15 CM
BH CV ECHO MEAS - SI(MOD-SP2): 13.7 ML/M2
BH CV ECHO MEAS - SI(MOD-SP4): 16.3 ML/M2
BH CV ECHO MEAS - SV(LVOT): 44.4 ML
BH CV ECHO MEAS - SV(MOD-SP2): 27 ML
BH CV ECHO MEAS - SV(MOD-SP4): 32 ML
BH CV ECHO MEAS - SV(RVOT): 75.4 ML
BH CV ECHO MEAS - TAPSE (>1.6): 1.75 CM
BH CV ECHO MEASUREMENTS AVERAGE E/E' RATIO: 11.73
BH CV XLRA - RV BASE: 2.9 CM
BH CV XLRA - RV LENGTH: 4.4 CM
BH CV XLRA - RV MID: 2.6 CM
BH CV XLRA - TDI S': 9.8 CM/SEC
BH CV XLRA MEAS LEFT DIST CCA EDV: -26.3 CM/SEC
BH CV XLRA MEAS LEFT DIST CCA PSV: -93.8 CM/SEC
BH CV XLRA MEAS LEFT DIST ICA EDV: -26.9 CM/SEC
BH CV XLRA MEAS LEFT DIST ICA PSV: -77.4 CM/SEC
BH CV XLRA MEAS LEFT ICA/CCA RATIO: 1.27
BH CV XLRA MEAS LEFT MID ICA EDV: -31.7 CM/SEC
BH CV XLRA MEAS LEFT MID ICA PSV: -96.3 CM/SEC
BH CV XLRA MEAS LEFT PROX CCA EDV: 29.8 CM/SEC
BH CV XLRA MEAS LEFT PROX CCA PSV: 95.6 CM/SEC
BH CV XLRA MEAS LEFT PROX ECA EDV: -19.8 CM/SEC
BH CV XLRA MEAS LEFT PROX ECA PSV: -92.2 CM/SEC
BH CV XLRA MEAS LEFT PROX ICA EDV: -36.7 CM/SEC
BH CV XLRA MEAS LEFT PROX ICA PSV: -118.7 CM/SEC
BH CV XLRA MEAS LEFT PROX SCLA PSV: 187.7 CM/SEC
BH CV XLRA MEAS LEFT VERTEBRAL A EDV: -13.3 CM/SEC
BH CV XLRA MEAS LEFT VERTEBRAL A PSV: -40.6 CM/SEC
BH CV XLRA MEAS RIGHT DIST CCA EDV: -26.9 CM/SEC
BH CV XLRA MEAS RIGHT DIST CCA PSV: -85.1 CM/SEC
BH CV XLRA MEAS RIGHT DIST ICA EDV: -37.9 CM/SEC
BH CV XLRA MEAS RIGHT DIST ICA PSV: -90 CM/SEC
BH CV XLRA MEAS RIGHT ICA/CCA RATIO: 1.19
BH CV XLRA MEAS RIGHT MID ICA EDV: -28.6 CM/SEC
BH CV XLRA MEAS RIGHT MID ICA PSV: -81.6 CM/SEC
BH CV XLRA MEAS RIGHT PROX CCA EDV: 22.4 CM/SEC
BH CV XLRA MEAS RIGHT PROX CCA PSV: 90.7 CM/SEC
BH CV XLRA MEAS RIGHT PROX ECA EDV: -17 CM/SEC
BH CV XLRA MEAS RIGHT PROX ECA PSV: -81.2 CM/SEC
BH CV XLRA MEAS RIGHT PROX ICA EDV: -44.7 CM/SEC
BH CV XLRA MEAS RIGHT PROX ICA PSV: -100.7 CM/SEC
BH CV XLRA MEAS RIGHT PROX SCLA PSV: 138.6 CM/SEC
BH CV XLRA MEAS RIGHT VERTEBRAL A EDV: -16.3 CM/SEC
BH CV XLRA MEAS RIGHT VERTEBRAL A PSV: -41.7 CM/SEC
BUN SERPL-MCNC: 11 MG/DL (ref 8–23)
BUN/CREAT SERPL: 15.7 (ref 7–25)
CALCIUM SPEC-SCNC: 8.9 MG/DL (ref 8.6–10.5)
CHLORIDE SERPL-SCNC: 110 MMOL/L (ref 98–107)
CO2 SERPL-SCNC: 22.7 MMOL/L (ref 22–29)
CREAT SERPL-MCNC: 0.7 MG/DL (ref 0.57–1)
DEPRECATED RDW RBC AUTO: 40.5 FL (ref 37–54)
EGFRCR SERPLBLD CKD-EPI 2021: 98.5 ML/MIN/1.73
ERYTHROCYTE [DISTWIDTH] IN BLOOD BY AUTOMATED COUNT: 12.9 % (ref 12.3–15.4)
GLUCOSE BLDC GLUCOMTR-MCNC: 106 MG/DL (ref 70–130)
GLUCOSE SERPL-MCNC: 90 MG/DL (ref 65–99)
HCT VFR BLD AUTO: 40.8 % (ref 34–46.6)
HGB BLD-MCNC: 13.8 G/DL (ref 12–15.9)
LEFT ATRIUM VOLUME INDEX: 16.1 ML/M2
MCH RBC QN AUTO: 29.6 PG (ref 26.6–33)
MCHC RBC AUTO-ENTMCNC: 33.8 G/DL (ref 31.5–35.7)
MCV RBC AUTO: 87.4 FL (ref 79–97)
PLATELET # BLD AUTO: 244 10*3/MM3 (ref 140–450)
PMV BLD AUTO: 10.8 FL (ref 6–12)
POTASSIUM SERPL-SCNC: 3.9 MMOL/L (ref 3.5–5.2)
RBC # BLD AUTO: 4.67 10*6/MM3 (ref 3.77–5.28)
SINUS: 3.2 CM
SODIUM SERPL-SCNC: 144 MMOL/L (ref 136–145)
STJ: 2.8 CM
TSH SERPL DL<=0.05 MIU/L-ACNC: 3.58 UIU/ML (ref 0.27–4.2)
WBC NRBC COR # BLD: 4.97 10*3/MM3 (ref 3.4–10.8)

## 2023-07-22 PROCEDURE — 93306 TTE W/DOPPLER COMPLETE: CPT

## 2023-07-22 PROCEDURE — G0378 HOSPITAL OBSERVATION PER HR: HCPCS

## 2023-07-22 PROCEDURE — 97162 PT EVAL MOD COMPLEX 30 MIN: CPT

## 2023-07-22 PROCEDURE — 85027 COMPLETE CBC AUTOMATED: CPT | Performed by: INTERNAL MEDICINE

## 2023-07-22 PROCEDURE — 93306 TTE W/DOPPLER COMPLETE: CPT | Performed by: INTERNAL MEDICINE

## 2023-07-22 PROCEDURE — 97110 THERAPEUTIC EXERCISES: CPT

## 2023-07-22 PROCEDURE — 25010000002 ENOXAPARIN PER 10 MG: Performed by: INTERNAL MEDICINE

## 2023-07-22 PROCEDURE — 80048 BASIC METABOLIC PNL TOTAL CA: CPT | Performed by: INTERNAL MEDICINE

## 2023-07-22 PROCEDURE — 82948 REAGENT STRIP/BLOOD GLUCOSE: CPT

## 2023-07-22 PROCEDURE — 96372 THER/PROPH/DIAG INJ SC/IM: CPT

## 2023-07-22 PROCEDURE — 93880 EXTRACRANIAL BILAT STUDY: CPT

## 2023-07-22 RX ORDER — ACETAMINOPHEN 325 MG/1
650 TABLET ORAL EVERY 6 HOURS PRN
Status: DISCONTINUED | OUTPATIENT
Start: 2023-07-22 | End: 2023-07-24 | Stop reason: HOSPADM

## 2023-07-22 RX ORDER — GABAPENTIN 400 MG/1
800 CAPSULE ORAL 2 TIMES DAILY
Status: DISCONTINUED | OUTPATIENT
Start: 2023-07-22 | End: 2023-07-24 | Stop reason: HOSPADM

## 2023-07-22 RX ADMIN — DULOXETINE HYDROCHLORIDE 60 MG: 60 CAPSULE, DELAYED RELEASE ORAL at 08:53

## 2023-07-22 RX ADMIN — DULOXETINE HYDROCHLORIDE 60 MG: 60 CAPSULE, DELAYED RELEASE ORAL at 20:56

## 2023-07-22 RX ADMIN — MONTELUKAST SODIUM 10 MG: 10 TABLET, FILM COATED ORAL at 08:53

## 2023-07-22 RX ADMIN — GABAPENTIN 800 MG: 400 CAPSULE ORAL at 20:56

## 2023-07-22 RX ADMIN — FLUTICASONE PROPIONATE 2 SPRAY: 50 SPRAY, METERED NASAL at 08:53

## 2023-07-22 RX ADMIN — GABAPENTIN 800 MG: 400 CAPSULE ORAL at 00:21

## 2023-07-22 RX ADMIN — GABAPENTIN 800 MG: 400 CAPSULE ORAL at 12:01

## 2023-07-22 RX ADMIN — Medication 1000 MCG: at 10:48

## 2023-07-22 RX ADMIN — Medication 10 ML: at 08:54

## 2023-07-22 RX ADMIN — ENOXAPARIN SODIUM 40 MG: 100 INJECTION SUBCUTANEOUS at 08:53

## 2023-07-22 NOTE — THERAPY EVALUATION
Patient Name: Gabi Tomas  : 1960    MRN: [id number]                              Today's Date: 2023       Admit Date: 2023    Visit Dx:     ICD-10-CM ICD-9-CM   1. Near syncope  R55 780.2   2. Paresthesias  R20.2 782.0     Patient Active Problem List   Diagnosis    Fibromyalgia    Chronic depression    Acquired hypothyroidism    High cholesterol    Vitamin D deficiency    Antinuclear factor positive    Gait instability    Dyslipidemia    Pain    Autonomic neuropathy    Intractable migraine with aura without status migrainosus    Small fiber neuropathy    Chronic bilateral low back pain without sciatica    Chronic right shoulder pain    Chest pain    Lumbar spondylolysis    Bilateral hand pain    Dizziness    Pelvic pain in female    Uterine tenderness    Renal stone    Cervical lesion    Morbidly obese    Ovarian mass, left    Acute laryngitis    Acute upper respiratory infection    Seasonal allergic rhinitis    Anxiety    Body mass index (BMI) 38.0-38.9, adult    Cervical radiculopathy    Claustrophobia    Closed fracture of fifth metatarsal bone    Constipation    Contact with and (suspected) exposure to other viral communicable diseases    COVID-19    Cyst of Bartholin's gland duct    Diverticulosis of colon    Dysphagia    Foot pain    Gastritis    Hematochezia    Hemorrhoids    Herpes zoster    History of palpitations    History of renal calculi    Hyperplastic polyp of large intestine    Influenza-like symptoms    Memory loss    Menopause    Migraine headache    Obstructive sleep apnea    Osteoarthritis of multiple joints    Other enthesopathies, not elsewhere classified    Other long term (current) drug therapy    Other specified conditions associated with female genital organs and menstrual cycle    Parkinson's disease    Personal history of COVID-19    History of malignant neoplasm of cervix    Right upper quadrant pain    Rosacea    Sciatica    Seasonal allergies    Sinusitis     Sjogren's syndrome    Subcutaneous mass of right lower extremity    Tremor    Urge incontinence of urine    Urinary incontinence    Verruca vulgaris    Visual hallucination    Abdominal wall pain in right upper quadrant    Acute bronchitis    Diverticulosis    H/O: hypothyroidism    History of osteoarthritis    H/O gastroesophageal reflux (GERD)    H/O urinary incontinence    Lumbar spondylosis    Lumbosacral spondylosis without myelopathy    DDD (degenerative disc disease), cervical    Chronic pain syndrome    Near syncope     Past Medical History:   Diagnosis Date    Allergic     Anemia     Anxiety     Arthritis     Autonomic neuropathy     Cancer 1988    cervical    Cervical dysplasia     Conization in the past    Cervical mass     Depression     Difficulty walking     Eating disorder     Environmental allergies     Fibromyalgia, primary     GERD (gastroesophageal reflux disease)     Headache     Hyperlipidemia     Hypothyroidism     Kidney stone     Low back pain     Migraine     Obesity     Orthostatic hypertension     Peptic ulceration     Peripheral neuropathy     Shingles     Sleep apnea     Syncope     Tremor     Urinary tract infection     Visual impairment     glasses     Past Surgical History:   Procedure Laterality Date    CERVICAL CONIZATION       SECTION PRIMARY      CHOLECYSTECTOMY  2019    DILATATION AND CURETTAGE      HIATAL HERNIA REPAIR  2023    MEDIAL BRANCH BLOCK Bilateral 2022    Procedure: LUMBAR MEDIAL BRANCH BLOCK Bilateral ~L3-S1;  Surgeon: Rosie Cuevas MD;  Location: [facility] MAIN OR;  Service: Pain Management;  Laterality: Bilateral;    MEDIAL BRANCH BLOCK Bilateral 2022    Procedure: LUMBAR MEDIAL BRANCH BLOCK BILATERAL L3-S1 #2;  Surgeon: Rosie Cuevas MD;  Location: [facility] MAIN OR;  Service: Pain Management;  Laterality: Bilateral;    NERVE SURGERY Left 2023    Procedure: LUMBAR RADIOFREQUENCY ABLATION LEFT L3-S1;  Surgeon: Rosie Cuevas MD;   "Location: [facility];  Service: Pain Management;  Laterality: Left;    NERVE SURGERY Right 02/15/2023    Procedure: LUMBAR RADIOFREQUENCY ABLATION RIGHT L3-S1;  Surgeon: Rosie Cuevas MD;  Location: [facility];  Service: Pain Management;  Laterality: Right;    NISSEN FUNDOPLICATION LAPAROSCOPIC  04/19/2023      General Information       Row Name 07/22/23 [facility]          Physical Therapy Time and Intention    Document Type evaluation  -CS     Mode of Treatment physical therapy  -CS       Row Name 07/22/23 [facility]          General Information    Patient Profile Reviewed yes  -CS     Existing Precautions/Restrictions fall  -CS       Row Name 07/22/23 1433          Cognition    Orientation Status (Cognition) oriented x 3  -CS       Row Name 07/22/23 1433          Safety Issues, Functional Mobility    Impairments Affecting Function (Mobility) endurance/activity tolerance  -CS               User Key  (r) = Recorded By, (t) = Taken By, (c) = Cosigned By      Initials Name Provider Type    CS Kevin Hurtado, PT Physical Therapist                   Mobility       Row Name 07/22/23 1433          Bed Mobility    Bed Mobility supine-sit;sit-supine  -CS     Supine-Sit [place] (Bed Mobility) standby assist  -CS     Sit-Supine [place] (Bed Mobility) standby assist  -CS       Row Name 07/22/23 [facility]          Sit-Stand Transfer    Sit-Stand [place] (Transfers) contact guard  -CS     Comment, (Sit-Stand Transfer) long rest break- dizziness  -CS       Row Name 07/22/23 [facility]          Gait/Stairs (Locomotion)    [place] Level (Gait) minimum assist (75% patient effort)  -CS     Distance in Feet (Gait) 50- some dizziness \"like im going to pass out\"  -CS     Deviations/Abnormal Patterns (Gait) gait speed decreased  -CS               User Key  (r) = Recorded By, (t) = Taken By, (c) = Cosigned By      Initials Name Provider Type    Kevin Mccall, PT Physical Therapist                   Obj/Interventions       " Row Name 07/22/23 1439          Range of Motion Comprehensive    General Range of Motion no range of motion deficits identified  -CS       Row Name 07/22/23 1439          Strength Comprehensive (MMT)    General Manual Muscle Testing (MMT) Assessment no strength deficits identified  -CS               User Key  (r) = Recorded By, (t) = Taken By, (c) = Cosigned By      Initials Name Provider Type    CS Kevin Hurtado, PT Physical Therapist                   Goals/Plan       Row Name 07/22/23 1440          Bed Mobility Goal 1 (PT)    Activity/Assistive Device (Bed Mobility Goal 1, PT) bed mobility activities, all  -CS     [place] Level/Cues Needed (Bed Mobility Goal 1, PT) modified independence  -CS     Time Frame (Bed Mobility Goal 1, PT) 1 week  -CS       Row Name 07/22/23 1440          Transfer Goal 1 (PT)    Activity/Assistive Device (Transfer Goal 1, PT) sit-to-stand/stand-to-sit;bed-to-chair/chair-to-bed  -CS     [place] Level/Cues Needed (Transfer Goal 1, PT) modified independence  -CS     Time Frame (Transfer Goal 1, PT) 1 week  -CS       Row Name 07/22/23 1440          Gait Training Goal 1 (PT)    Activity/Assistive Device (Gait Training Goal 1, PT) assistive device use  -CS     [place] Level (Gait Training Goal 1, PT) modified independence  -CS     Distance (Gait Training Goal 1, PT) 300  -CS     Time Frame (Gait Training Goal 1, PT) 1 week  -CS       Row Name 07/22/23 1440          Therapy Assessment/Plan (PT)    Planned Therapy Interventions (PT) balance training;bed mobility training;gait training;home exercise program;manual therapy techniques;strengthening;stretching;stair training;ROM (range of motion);patient/family education;neuromuscular re-education;transfer training  -CS               User Key  (r) = Recorded By, (t) = Taken By, (c) = Cosigned By      Initials Name Provider Type    Kevin Mccall, PT Physical Therapist                   Clinical Impression       Row Name  07/22/23 1439          Pain    Pretreatment Pain Rating 0/10 - no pain  -CS     Posttreatment Pain Rating 0/10 - no pain  -CS       Row Name 07/22/23 1439          Plan of Care Review    Plan of Care Reviewed With patient  -CS       Row Name 07/22/23 1439          Therapy Assessment/Plan (PT)    Patient/Family Therapy Goals Statement (PT) home  -CS     Rehab Potential (PT) good, to achieve stated therapy goals  -CS     Criteria for Skilled Interventions Met (PT) yes  -CS     Therapy Frequency (PT) daily  -CS       Row Name 07/22/23 1439          Vital Signs    O2 Delivery Pre Treatment room air  -CS       Row Name 07/22/23 1439          Positioning and Restraints    Pre-Treatment Position in bed  -CS     Post Treatment Position bed  -CS     In Bed supine;encouraged to call for assist;call light within reach;exit alarm on  -CS               User Key  (r) = Recorded By, (t) = Taken By, (c) = Cosigned By      Initials Name Provider Type    Kevin Mccall, PT Physical Therapist                   Outcome Measures       Row Name 07/22/23 1441 07/22/23 0900       How much help from another person do you currently need...    Turning from your back to your side while in flat bed without using bedrails? 4  -CS 4  -AD    Moving from lying on back to sitting on the side of a flat bed without bedrails? 4  -CS 4  -AD    Moving to and from a bed to a chair (including a wheelchair)? 3  -CS 3  -AD    Standing up from a chair using your arms (e.g., wheelchair, bedside chair)? 3  -CS 3  -AD    Climbing 3-5 steps with a railing? 3  -CS 3  -AD    To walk in hospital room? 3  -CS 3  -AD    AM-PAC 6 Clicks Score (PT) 20  -CS 20  -AD    Highest level of mobility 6 --> Walked 10 steps or more  -CS 6 --> Walked 10 steps or more  -AD      Row Name 07/22/23 1441          Functional Assessment    Outcome Measure Options AM-PAC 6 Clicks Basic Mobility (PT)  -CS               User Key  (r) = Recorded By, (t) = Taken By, (c) = Cosigned By       Initials Name Provider Type    CS Kevin Hurtado, PT Physical Therapist    Charlotte Crystal, RN Registered Nurse                                 Physical Therapy Education       Title: PT OT SLP Therapies (Done)       Topic: Physical Therapy (Done)       Point: Mobility training (Done)       Learning Progress Summary             Patient Acceptance, E,TB, VU,NR by  at 7/22/2023 1441                         Point: Home exercise program (Done)       Learning Progress Summary             Patient Acceptance, E,TB, VU,NR by  at 7/22/2023 1441                         Point: Body mechanics (Done)       Learning Progress Summary             Patient Acceptance, E,TB, VU,NR by  at 7/22/2023 1441                         Point: Precautions (Done)       Learning Progress Summary             Patient Acceptance, E,TB, VU,NR by  at 7/22/2023 1441                                         User Key       Initials Effective Dates Name Provider Type Discipline     07/11/23 -  Kevin Hurtado, PT Physical Therapist PT                  PT Recommendation and Plan  Planned Therapy Interventions (PT): balance training, bed mobility training, gait training, home exercise program, manual therapy techniques, strengthening, stretching, stair training, ROM (range of motion), patient/family education, neuromuscular re-education, transfer training  Plan of Care Reviewed With: patient     Time Calculation:         PT Charges       Row Name 07/22/23 1454 07/22/23 1453          Time Calculation    Start Time 1430  - 1425  -     Stop Time 1449  - 1445  -     Time Calculation (min) 19 min  - 20 min  -     PT Received On -- 07/22/23  -     PT - Next Appointment -- 07/23/23  -     PT Goal Re-Cert Due Date -- 07/29/23  -               User Key  (r) = Recorded By, (t) = Taken By, (c) = Cosigned By      Initials Name Provider Type    Kevin Mccall, PT Physical Therapist                  Therapy Charges for Today        Code Description Service Date Service Provider Modifiers Qty    08790511771 HC PT EVAL MOD COMPLEXITY 2 7/22/2023 Bryant, Kevin PEREYRA, PT GP 1    73830855005 HC PT THER PROC EA 15 MIN 7/22/2023 Kevin Hurtado, PT GP 1    72645598288 HC PT EVAL MOD COMPLEXITY 2 7/22/2023 Bryant, Kevin PEREYRA, PT GP 1    46814916305 HC PT THER PROC EA 15 MIN 7/22/2023 Kevin Hurtado, PT GP 1            PT G-Codes  Outcome Measure Options: AM-PAC 6 Clicks Basic Mobility (PT)  AM-PAC 6 Clicks Score (PT): 20  PT Discharge Summary  Anticipated Discharge Disposition (PT): home with assist, skilled nursing facility    Kevin Hurtado, PT  7/22/2023

## 2023-07-22 NOTE — H&P
HISTORY AND PHYSICAL   [facility], [facility]      2023    Patient Identification:    Name: Gabi Tomas  Age: 63 y.o.  Sex: female  :  1960  MRN: [id number]                       Primary Care Physician: Carrillo Drake MD    Chief Complaint:      Chief Complaint   Patient presents with    Dizziness         History of Present Illness:     Patient is a 63-year-old female.  Patient has history of autonomic neuropathy, fibromyalgia, GERD, hyperlipidemia, hypothyroidism, obesity, orthostatic hypotension, syncope, lumbosacral spondylosis, Parkinson's disease, migraine headache, cervical radiculopathy, chronic pain syndrome.  Patient has developed several episodes of lightheadedness, and feeling that she was going to pass out, also he has developed some confusion during these episodes..  Also, patient has been told that she has a decreased pulse on the left arm.  In the ER, patient was found to have: Creatinine 0.79, potassium 4.1, WBC 5.95, hemoglobin 15.0.  Chest ray was negative.  Patient was placed in the hospital for further management.      Past Medical History:  Past Medical History:   Diagnosis Date    Allergic     Anemia     Anxiety     Arthritis     Autonomic neuropathy     Cancer 1988    cervical    Cervical dysplasia     Conization in the past    Cervical mass     Depression     Difficulty walking     Eating disorder     Environmental allergies     Fibromyalgia, primary     GERD (gastroesophageal reflux disease)     Headache     Hyperlipidemia     Hypothyroidism     Kidney stone     Low back pain     Migraine     Obesity     Orthostatic hypertension     Peptic ulceration     Peripheral neuropathy     Shingles     Sleep apnea     Syncope     Tremor     Urinary tract infection     Visual impairment     glasses     Past Surgical History:  Past Surgical History:   Procedure Laterality Date    CERVICAL CONIZATION       SECTION PRIMARY      CHOLECYSTECTOMY  2019    DILATATION  AND CURETTAGE      HIATAL HERNIA REPAIR  04/19/2023    MEDIAL BRANCH BLOCK Bilateral 11/07/2022    Procedure: LUMBAR MEDIAL BRANCH BLOCK Bilateral ~L3-S1;  Surgeon: Rosie Cuevas MD;  Location[facility];  Service: Pain Management;  Laterality: Bilateral;    MEDIAL BRANCH BLOCK Bilateral 12/16/2022    Procedure: LUMBAR MEDIAL BRANCH BLOCK BILATERAL L3-S1 #2;  Surgeon: Rosie Cuevas MD;  Location[facility];  Service: Pain Management;  Laterality: Bilateral;    NERVE SURGERY Left 02/01/2023    Procedure: LUMBAR RADIOFREQUENCY ABLATION LEFT L3-S1;  Surgeon: Rosie Cuevas MD;  [facility];  Service: Pain Management;  Laterality: Left;    NERVE SURGERY Right 02/15/2023    Procedure: LUMBAR RADIOFREQUENCY ABLATION RIGHT L3-S1;  Surgeon: Rosie Cuevas MD;  Location[facility];  Service: Pain Management;  Laterality: Right;    NISSEN FUNDOPLICATION LAPAROSCOPIC  04/19/2023      Home Meds:  Medications Prior to Admission   Medication Sig Dispense Refill Last Dose    DULoxetine (CYMBALTA) 30 MG capsule Take 1 capsule by mouth Daily. Takes this dose with the morning 60 mg dose.   7/21/2023 at 0900    DULoxetine (CYMBALTA) 60 MG capsule Take 1 capsule by mouth 2 (Two) Times a Day.   7/21/2023 at 0900    etodolac (LODINE) 400 MG tablet Take 1 tablet by mouth 2 (Two) Times a Day As Needed (pain). 60 tablet 0 7/21/2023 at 0900    fluticasone (FLONASE) 50 MCG/ACT nasal spray 2 sprays into the nostril(s) as directed by provider Daily.   7/21/2023 at 0900    gabapentin (NEURONTIN) 800 MG tablet TAKE 1 TABLET BY MOUTH THREE TIMES DAILY 90 tablet 1 7/21/2023 at 0900    montelukast (SINGULAIR) 10 MG tablet Take 1 tablet by mouth.   7/20/2023 at 2100    vitamin B-12 (CYANOCOBALAMIN) 1000 MCG tablet TAKE 1 TABLET BY MOUTH EVERY DAY 30 tablet 5 7/21/2023 at 0900    vitamin D (ERGOCALCIFEROL) 1.25 MG (56930 UT) capsule capsule Take 1 capsule by mouth 1 (One) Time Per Week. Mondays   Past Week     "ALPRAZolam (XANAX) 0.5 MG tablet TK 1 T PO  BID PRN  5 More than a month    ondansetron ODT (ZOFRAN-ODT) 4 MG disintegrating tablet As Needed.   Unknown       Allergies:  Allergies   Allergen Reactions    Ampicillin Anaphylaxis, Rash and Other (See Comments)    Cyclobenzaprine Other (See Comments)     \"made me sleep for 2 days\"    Levofloxacin Unknown - Low Severity    Milnacipran Other (See Comments)     Palpitations, rectal bleeding    Quinolones Other (See Comments)     Neurological issues     Savella [Milnacipran Hcl] Unknown - High Severity    Sulfa Antibiotics Angioedema    Pregabalin Palpitations and Other (See Comments)     Immunizations:  Immunization History   Administered Date(s) Administered    COVID-19 (PFIZER) Purple Cap Monovalent 03/27/2021, 04/16/2021    Covid-19 (Pfizer) Gray Cap Monovalent 07/30/2022    FluLaval/Fluzone >6mos 09/23/2018, 10/14/2021    Pneumococcal Conjugate 13-Valent (PCV13) 10/07/2018    Pneumococcal Polysaccharide (PPSV23) 08/04/2022     Social History:   Social History     Social History Narrative    Not on file     Social History     Tobacco Use    Smoking status: Some Days     Packs/day: 0.25     Years: 42.00     Pack years: 10.50     Types: Cigarettes    Smokeless tobacco: Never   Substance Use Topics    Alcohol use: Yes     Comment: rare     Family History:  Family History   Problem Relation Age of Onset    Lymphoma Mother     Depression Mother     Migraines Mother     Cancer Father     Heart attack Father     Psoriasis Father     Alcohol abuse Father     Aneurysm Brother     COPD Brother     Heart disease Brother     Heart attack Brother     Migraines Son     Cancer Maternal Aunt     Cancer Maternal Uncle     Cancer Paternal Aunt     Heart disease Paternal Aunt     Cancer Paternal Uncle     Heart disease Paternal Uncle     Aneurysm Brother     Lung disease Brother     Alcohol abuse Brother     ADD / ADHD Son     Migraines Son         Review of Systems  See history of " "present illness and past medical history.    Near syncope episodes  Lightheadedness  No nausea, vomiting, diarrhea, constipation, chest pain, shortness of breath.    Objective:    Exam:    T MAX 24 hrs: Temp (24hrs), Av.9 °F (36.6 °C), Min:97.7 °F (36.5 °C), Max:98.2 °F (36.8 °C)    Vitals Ranges:   Temp:  [97.7 °F (36.5 °C)-98.2 °F (36.8 °C)] 98.2 °F (36.8 °C)  Heart Rate:  [57-85] 65  Resp:  [16] 16  BP: ()/(55-86) 87/55    BP (!) 87/55 (BP Location: Right arm, Patient Position: Lying)   Pulse 65   Temp 98.2 °F (36.8 °C) (Oral)   Resp 16   Ht 160 cm (63\")   Wt 94.3 kg (208 lb)   SpO2 96%   BMI 36.85 kg/m²     General: Alert, oriented x 3. Cooperative, no distress, appears stated age.  However, she looks anxious.  HEENT:    Head: Normocephalic, without obvious abnormality, atraumatic  Eyes: EOM are normal. Pupils are equal  Oropharynx: Mucosa and tongue normal  Neck: Supple, symmetrical, trachea midline, no adenopathy;              thyroid:  no enlargement/tenderness/nodules;              no carotid bruit or JVD  Cardiovascular: Normal rate, regular rhythm and intact distal pulses.              Exam reveals no gallop and no friction rub. No murmur heard  Chest wall: No tenderness or deformity  Pulmonary: Clear to auscultation bilaterally, respirations unlabored.               No rhonchi, wheezing or rales.   Abdominal: Soft, nontender, bowel sounds active all four quadrants,     no masses, no hepatomegaly, no splenomegaly.   Extremities: Normal, atraumatic, no cyanosis or edema  Pulses: 2 + symmetric all extremities  Neurological: Patient is alert and oriented to person, place, and time.  No new focal sensorimotor deficit.  Skin: Skin color, texture, normal. Turgor is decreased. No rashes or lesions      Data Review:    Results from last 7 days   Lab Units 23  0440 23   WBC 10*3/mm3 4.97 5.95   HEMOGLOBIN g/dL 13.8 15.0   HEMATOCRIT % 40.8 44.3   PLATELETS 10*3/mm3 244 264 "       Results from last 7 days   Lab Units 07/22/23  0440 07/21/23 2003   SODIUM mmol/L 144 143   POTASSIUM mmol/L 3.9 4.1   CHLORIDE mmol/L 110* 108*   CO2 mmol/L 22.7 23.8   BUN mg/dL 11 12   CREATININE mg/dL 0.70 0.79   CALCIUM mg/dL 8.9 9.3   BILIRUBIN mg/dL  --  0.5   ALK PHOS U/L  --  86   ALT (SGPT) U/L  --  27   AST (SGOT) U/L  --  26   GLUCOSE mg/dL 90 99                 Lab Results   Lab Value Date/Time    TROPONINT 10 (H) 07/21/2023 2003    TROPONINT <0.010 12/22/2022 1857    TROPONINT <0.010 01/04/2017 2122       Brief Urine Lab Results  (Last result in the past 365 days)        Color   Clarity   Blood   Leuk Est   Nitrite   Protein   CREAT   Urine HCG        12/22/22 1921 Yellow   Clear   Negative   Negative   Negative   Negative                    Imaging Results (All)       Procedure Component Value Units Date/Time    XR Chest 2 View [[id number]] Collected: 07/21/23 2009     Updated: 07/21/23 2102    Narrative:      TWO VIEWS OF THE CHEST     HISTORY: 63-year-old female with a history of chest pain.     FINDINGS: PA and lateral chest radiographs were obtained. Comparison is  made to a chest radiograph dated 12/22/2022. The lungs are normal in  volume and are clear of consolidation. Mild subpleural interstitial  scarring is seen at the base of the left lung. The cardiomediastinal  silhouette and osseous structures appear normal.       Impression:      There is no evidence for an active or acute abnormality of  the chest.     This report was finalized on 7/21/2023 8:59 PM by Dr. Martir Reza M.D.               Assessment:      Near syncope  Recurrent near syncope  Orthostatic hypotension  Autonomic neuropathy  Fibromyalgia  GERD  Hyperlipidemia  Hypothyroidism  Migraine  Obesity        Plan:    Observation status  Work-up for syncope  Cardiology consult, apparently patient has seen Dr. Salomon before.  Check orthostatic hypotension  After further talking to patient, she states that symptoms may  have started when dose of Cymbalta was increased from 60 twice a day to 90 in the morning and 60 in the evening.  We will change Cymbalta back to twice a day.  Will await cardiology recommendations.  Monitor and correct electrolytes  Accu-Chek and SSI  Monitor mental status  Home medications  DVT/stress ulcer prophylaxis  Labs in am        Geraldo Albright MD  7/22/2023  08:35 EDT

## 2023-07-22 NOTE — PLAN OF CARE
Goal Outcome Evaluation:           Progress: no change  Outcome Evaluation: Pt admitted 7/21/2023 for near syncope . Pt denies pain, dizziness . Pt states she had tingling feeling both hands and lightheadedness for a past one  week. Safety maintained. Will continue to monitor.

## 2023-07-22 NOTE — SIGNIFICANT NOTE
07/22/23 1355   Vital Signs   Heart Rate 76   /76  (standing)   Noninvasive MAP (mmHg) 88   Oxygen Therapy   SpO2 97 %     No dizziness reported per patient

## 2023-07-22 NOTE — ED NOTES
Nursing report ED to floor  Gabi Tomas  63 y.o.  female    HPI :   Chief Complaint   Patient presents with    Dizziness       Admitting doctor:   Geraldo Albright MD    Admitting diagnosis:   The primary encounter diagnosis was Near syncope. A diagnosis of Paresthesias was also pertinent to this visit.    Code status:   Current Code Status       Date Active Code Status Order ID Comments User Context       Not on file            Allergies:   Ampicillin, Cyclobenzaprine, Levofloxacin, Milnacipran, Quinolones, Savella [milnacipran hcl], Sulfa antibiotics, and Pregabalin    Isolation:   No active isolations    Intake and Output  No intake or output data in the 24 hours ending 07/21/23 2116    Weight:       07/21/23  1900   Weight: 94.3 kg (208 lb)       Most recent vitals:   Vitals:    07/21/23 2006 07/21/23 2011 07/21/23 2041 07/21/23 2111   BP:  114/69 122/81 128/77   Pulse: 71 71 64 64   Resp: 16   16   Temp:       TempSrc:       SpO2: 93% 94% 93% 95%   Weight:       Height:           Active LDAs/IV Access:   Lines, Drains & Airways       Active LDAs       Name Placement date Placement time Site Days    Peripheral IV 07/21/23 2004 Right Forearm 07/21/23 2004  Forearm  less than 1                    Labs (abnormal labs have a star):   Labs Reviewed   COMPREHENSIVE METABOLIC PANEL - Abnormal; Notable for the following components:       Result Value    Chloride 108 (*)     All other components within normal limits    Narrative:     GFR Normal >60  Chronic Kidney Disease <60  Kidney Failure <15     SINGLE HSTROPONIN T - Abnormal; Notable for the following components:    HS Troponin T 10 (*)     All other components within normal limits    Narrative:     High Sensitive Troponin T Reference Range:  <10.0 ng/L- Negative Female for AMI  <15.0 ng/L- Negative Male for AMI  >=10 - Abnormal Female indicating possible myocardial injury.  >=15 - Abnormal Male indicating possible myocardial injury.   Clinicians would have to  utilize clinical acumen, EKG, Troponin, and serial changes to determine if it is an Acute Myocardial Infarction or myocardial injury due to an underlying chronic condition.        CBC WITH AUTO DIFFERENTIAL - Normal   CBC AND DIFFERENTIAL    Narrative:     The following orders were created for panel order CBC & Differential.  Procedure                               Abnormality         Status                     ---------                               -----------         ------                     CBC Auto Differential[[id number]]        Normal              Final result                 Please view results for these tests on the individual orders.       EKG:   ECG 12 Lead Syncope   Preliminary Result   HEART RATE= 71  bpm   RR Interval= 845  ms   CT Interval= 193  ms   P Horizontal Axis= -7  deg   P Front Axis= 46  deg   QRSD Interval= 87  ms   QT Interval= 381  ms   QRS Axis= 3  deg   T Wave Axis= 30  deg   - BORDERLINE ECG -   Sinus rhythm   Abnormal R-wave progression, late transition   Borderline T abnormalities, anterior leads   Electronically Signed By:    Date and Time of Study: 2023-07-21 19:31:17          Meds given in ED:   Medications   sodium chloride 0.9 % flush 10 mL (has no administration in time range)       Imaging results:  XR Chest 2 View    Result Date: 7/21/2023  There is no evidence for an active or acute abnormality of the chest.  This report was finalized on 7/21/2023 8:59 PM by Dr. Martir Reza M.D.       Ambulatory status:   - Independent    Social issues:   Social History     Socioeconomic History    Marital status:    Tobacco Use    Smoking status: Some Days     Packs/day: 0.25     Years: 42.00     Pack years: 10.50     Types: Cigarettes    Smokeless tobacco: Never   Vaping Use    Vaping Use: Never used   Substance and Sexual Activity    Alcohol use: Yes     Comment: rare    Drug use: No    Sexual activity: Defer       NIH Stroke Scale:       Leslye Meier RN  07/21/23 21:16  EDT

## 2023-07-22 NOTE — PLAN OF CARE
Goal Outcome Evaluation:  Plan of Care Reviewed With: patient      Pt admitted his syncope symptoms presents with weakness and dizziness with standing. This visit she was able to walk 50' Seda felt some symptoms of dizziness that improved with laying supine. She did several exercises and tolerated well. Anticipate d/c home with assist when medically ready.             Anticipated Discharge Disposition (PT): home with assist, skilled nursing facility

## 2023-07-22 NOTE — SIGNIFICANT NOTE
Orthostatic Blood Pres   07/22/23 1345   Vital Signs   /54  (laying)   Noninvasive MAP (mmHg) 72     sures

## 2023-07-22 NOTE — PLAN OF CARE
Goal Outcome Evaluation:  Plan of Care Reviewed With: patient        Progress: improving  Outcome Evaluation: Pt denies pain/dizziness; requires additional time when first standing- steady with cane; provided for safety; call light in reach;

## 2023-07-23 ENCOUNTER — APPOINTMENT (OUTPATIENT)
Dept: CARDIOLOGY | Facility: HOSPITAL | Age: 63
End: 2023-07-23
Payer: MEDICARE

## 2023-07-23 LAB
ANION GAP SERPL CALCULATED.3IONS-SCNC: 12 MMOL/L (ref 5–15)
BH CV UPPER ARTERIAL LEFT 2ND DIGIT SYS MAX: 132
BH CV UPPER ARTERIAL LEFT FBI 2ND DIGIT RATIO: 0.95
BH CV UPPER ARTERIAL LEFT WBI RATIO: 0.95
BH CV UPPER ARTERIAL RIGHT 2ND DIGIT SYS MAX: 159
BH CV UPPER ARTERIAL RIGHT FBI 2ND DIGIT RATIO: 1.14
BH CV UPPER ARTERIAL RIGHT WBI RATIO: 0.97
BILIRUB UR QL STRIP: NEGATIVE
BUN SERPL-MCNC: 14 MG/DL (ref 8–23)
BUN/CREAT SERPL: 19.2 (ref 7–25)
CALCIUM SPEC-SCNC: 9.4 MG/DL (ref 8.6–10.5)
CHLORIDE SERPL-SCNC: 108 MMOL/L (ref 98–107)
CLARITY UR: ABNORMAL
CO2 SERPL-SCNC: 21 MMOL/L (ref 22–29)
COLOR UR: YELLOW
CREAT SERPL-MCNC: 0.73 MG/DL (ref 0.57–1)
DEPRECATED RDW RBC AUTO: 42.6 FL (ref 37–54)
EGFRCR SERPLBLD CKD-EPI 2021: 93.7 ML/MIN/1.73
ERYTHROCYTE [DISTWIDTH] IN BLOOD BY AUTOMATED COUNT: 13.2 % (ref 12.3–15.4)
GEN 5 2HR TROPONIN T REFLEX: <6 NG/L
GLUCOSE SERPL-MCNC: 107 MG/DL (ref 65–99)
GLUCOSE UR STRIP-MCNC: NEGATIVE MG/DL
HCT VFR BLD AUTO: 44.8 % (ref 34–46.6)
HGB BLD-MCNC: 14.8 G/DL (ref 12–15.9)
HGB UR QL STRIP.AUTO: NEGATIVE
KETONES UR QL STRIP: ABNORMAL
LEUKOCYTE ESTERASE UR QL STRIP.AUTO: NEGATIVE
MCH RBC QN AUTO: 29.1 PG (ref 26.6–33)
MCHC RBC AUTO-ENTMCNC: 33 G/DL (ref 31.5–35.7)
MCV RBC AUTO: 88.2 FL (ref 79–97)
NITRITE UR QL STRIP: NEGATIVE
PH UR STRIP.AUTO: 6.5 [PH] (ref 5–8)
PLATELET # BLD AUTO: 262 10*3/MM3 (ref 140–450)
PMV BLD AUTO: 11 FL (ref 6–12)
POTASSIUM SERPL-SCNC: 4 MMOL/L (ref 3.5–5.2)
PROT UR QL STRIP: NEGATIVE
QT INTERVAL: 374 MS
RBC # BLD AUTO: 5.08 10*6/MM3 (ref 3.77–5.28)
SODIUM SERPL-SCNC: 141 MMOL/L (ref 136–145)
SP GR UR STRIP: 1.01 (ref 1–1.03)
TROPONIN T DELTA: NORMAL
TROPONIN T SERPL HS-MCNC: <6 NG/L
UPPER ARTERIAL LEFT ARM BRACHIAL SYS MAX: 131 MMHG
UPPER ARTERIAL LEFT ARM RADIAL SYS MAX: 132 MMHG
UPPER ARTERIAL LEFT ARM ULNAR SYS MAX: 132 MMHG
UPPER ARTERIAL RIGHT ARM BRACHIAL SYS MAX: 139 MMHG
UPPER ARTERIAL RIGHT ARM RADIAL SYS MAX: 135 MMHG
UPPER ARTERIAL RIGHT ARM ULNAR SYS MAX: 129 MMHG
UROBILINOGEN UR QL STRIP: ABNORMAL
WBC NRBC COR # BLD: 5.4 10*3/MM3 (ref 3.4–10.8)

## 2023-07-23 PROCEDURE — 99204 OFFICE O/P NEW MOD 45 MIN: CPT | Performed by: STUDENT IN AN ORGANIZED HEALTH CARE EDUCATION/TRAINING PROGRAM

## 2023-07-23 PROCEDURE — 85027 COMPLETE CBC AUTOMATED: CPT | Performed by: INTERNAL MEDICINE

## 2023-07-23 PROCEDURE — 93922 UPR/L XTREMITY ART 2 LEVELS: CPT

## 2023-07-23 PROCEDURE — G0378 HOSPITAL OBSERVATION PER HR: HCPCS

## 2023-07-23 PROCEDURE — 96372 THER/PROPH/DIAG INJ SC/IM: CPT

## 2023-07-23 PROCEDURE — 25010000002 ENOXAPARIN PER 10 MG: Performed by: INTERNAL MEDICINE

## 2023-07-23 PROCEDURE — 80048 BASIC METABOLIC PNL TOTAL CA: CPT | Performed by: INTERNAL MEDICINE

## 2023-07-23 PROCEDURE — 84484 ASSAY OF TROPONIN QUANT: CPT | Performed by: INTERNAL MEDICINE

## 2023-07-23 PROCEDURE — 81003 URINALYSIS AUTO W/O SCOPE: CPT | Performed by: INTERNAL MEDICINE

## 2023-07-23 PROCEDURE — 93005 ELECTROCARDIOGRAM TRACING: CPT | Performed by: INTERNAL MEDICINE

## 2023-07-23 RX ORDER — CLONAZEPAM 0.5 MG/1
0.5 TABLET ORAL EVERY 12 HOURS SCHEDULED
Status: DISCONTINUED | OUTPATIENT
Start: 2023-07-23 | End: 2023-07-24 | Stop reason: HOSPADM

## 2023-07-23 RX ADMIN — DULOXETINE HYDROCHLORIDE 60 MG: 60 CAPSULE, DELAYED RELEASE ORAL at 08:55

## 2023-07-23 RX ADMIN — ENOXAPARIN SODIUM 40 MG: 100 INJECTION SUBCUTANEOUS at 08:54

## 2023-07-23 RX ADMIN — ACETAMINOPHEN 650 MG: 325 TABLET, FILM COATED ORAL at 11:04

## 2023-07-23 RX ADMIN — DULOXETINE HYDROCHLORIDE 60 MG: 60 CAPSULE, DELAYED RELEASE ORAL at 20:31

## 2023-07-23 RX ADMIN — Medication 1000 MCG: at 08:55

## 2023-07-23 RX ADMIN — GABAPENTIN 800 MG: 400 CAPSULE ORAL at 08:55

## 2023-07-23 RX ADMIN — FLUTICASONE PROPIONATE 2 SPRAY: 50 SPRAY, METERED NASAL at 08:56

## 2023-07-23 RX ADMIN — MONTELUKAST SODIUM 10 MG: 10 TABLET, FILM COATED ORAL at 08:55

## 2023-07-23 RX ADMIN — CLONAZEPAM 0.5 MG: 0.5 TABLET ORAL at 17:17

## 2023-07-23 RX ADMIN — GABAPENTIN 800 MG: 400 CAPSULE ORAL at 20:31

## 2023-07-23 RX ADMIN — ACETAMINOPHEN 650 MG: 325 TABLET, FILM COATED ORAL at 04:37

## 2023-07-23 NOTE — PLAN OF CARE
Goal Outcome Evaluation:           Progress: no change  Outcome Evaluation: Progress: no change  Outcome Evaluation: Pt admitted 7/21/2023 for near syncope . Pt states she had tingling feeling both hands and lightheadedness for a past one  week. Pt feels light headedness in my shift - checked vital /76 , Room air 96 %,  temp 98.2, Hr 63, Res. 18 , glucose 106.  Safety maintained. Will continue to monitor.

## 2023-07-23 NOTE — PROGRESS NOTES
"DAILY PROGRESS NOTE  [facility]    2023    Patient Identification:  Name: Gabi Tomas  Age: 63 y.o.  Sex: female  :  1960  MRN: [id number]           Primary Care Physician: Carrillo Drake MD    Subjective:    Interval History:    Patient has multiple complaints since last night  Increased tremors in both  hands worse on the left side, more anxious this morning  Some numbness sensation in the left hand as well  Complained of chest pain earlier, resolved at this time, troponin negative EKG negative.  Patient is very depressed and also anxious, however, does not like to take many medications apparently.  Orthostatic positive this morning.    Complaining also urinary frequency,  urine sent for culture sensitivity.  Potassium 4.0, creatinine 0.73, hemoglobin 14.8, WBC 5.40.    ROS:     Denies having any nausea, vomiting diarrhea, constipation, shortness of breath.    Objective:    Scheduled Meds:  DULoxetine, 60 mg, Oral, BID  enoxaparin, 40 mg, Subcutaneous, Q24H  fluticasone, 2 spray, Nasal, Daily  gabapentin, 800 mg, Oral, BID  montelukast, 10 mg, Oral, Daily  vitamin B-12, 1,000 mcg, Oral, Daily        Continuous Infusions:       PRN Meds:    acetaminophen    ALPRAZolam    ondansetron    [COMPLETED] Insert Peripheral IV **AND** sodium chloride    Intake/Output:    Intake/Output Summary (Last 24 hours) at 2023 1407  Last data filed at 2023 1317  Gross per 24 hour   Intake 0 ml   Output --   Net 0 ml         Exam:    T MAX 24 hrs: Temp (24hrs), Av.2 °F (36.8 °C), Min:97.5 °F (36.4 °C), Max:98.6 °F (37 °C)    Vitals Ranges:   Temp:  [97.5 °F (36.4 °C)-98.6 °F (37 °C)] 98.4 °F (36.9 °C)  Heart Rate:  [63-80] 80  Resp:  [18] 18  BP: ()/(58-79) 112/73    /73 (BP Location: Right arm, Patient Position: Lying)   Pulse 80   Temp 98.4 °F (36.9 °C) (Oral)   Resp 18   Ht 160 cm (62.99\")   Wt 94.3 kg (207 lb 14.3 oz)   SpO2 95% "   BMI 36.84 kg/m²     General: Alert, cooperative, no distress, appears stated age.  Has tremors in the left hand, looks anxious.  Neck: Supple, symmetrical, trachea midline, no adenopathy;              thyroid:  no enlargement/tenderness/nodules;              no carotid bruit or JVD  Cardiovascular: Normal rate, regular rhythm and intact distal pulses.              Exam reveals no gallop and no friction rub. No murmur heard  Pulmonary: Clear to auscultation bilaterally, respirations unlabored.               No rhonchi, wheezing or rales.   Abdominal: Soft. Soft, nontender, bowel sounds active all four quadrants,     no masses, no hepatomegaly, no splenomegaly.   Extremities: Normal, atraumatic, no cyanosis or edema  Pulses: 2 + symmetric all extremities  Neurological: Patient is alert and oriented to person, place, and time.  No new focal sensorimotor deficit.  Skin: Skin color, texture, normal. Turgor is decreased. No rashes or lesions      Data Review:    Results from last 7 days   Lab Units 07/23/23 0437 07/22/23 0440 07/21/23 2003   WBC 10*3/mm3 5.40 4.97 5.95   HEMOGLOBIN g/dL 14.8 13.8 15.0   HEMATOCRIT % 44.8 40.8 44.3   PLATELETS 10*3/mm3 262 244 264       Results from last 7 days   Lab Units 07/23/23 0437 07/22/23  0440 07/21/23 2003   SODIUM mmol/L 141 144 143   POTASSIUM mmol/L 4.0 3.9 4.1   CHLORIDE mmol/L 108* 110* 108*   CO2 mmol/L 21.0* 22.7 23.8   BUN mg/dL 14 11 12   CREATININE mg/dL 0.73 0.70 0.79   CALCIUM mg/dL 9.4 8.9 9.3   BILIRUBIN mg/dL  --   --  0.5   ALK PHOS U/L  --   --  86   ALT (SGPT) U/L  --   --  27   AST (SGOT) U/L  --   --  26   GLUCOSE mg/dL 107* 90 99                 Lab Results   Lab Value Date/Time    TROPONINT <6 07/23/2023 1217    TROPONINT <6 07/23/2023 1018    TROPONINT 10 (H) 07/21/2023 2003    TROPONINT <0.010 12/22/2022 1857    TROPONINT <0.010 01/04/2017 2122       Microbiology Results (last 10 days)       ** No results found for the last 240 hours. **              Imaging Results (Last 7 Days)       Procedure Component Value Units Date/Time    XR Chest 2 View [[id number]] Collected: 07/21/23 2009     Updated: 07/21/23 2102    Narrative:      TWO VIEWS OF THE CHEST     HISTORY: 63-year-old female with a history of chest pain.     FINDINGS: PA and lateral chest radiographs were obtained. Comparison is  made to a chest radiograph dated 12/22/2022. The lungs are normal in  volume and are clear of consolidation. Mild subpleural interstitial  scarring is seen at the base of the left lung. The cardiomediastinal  silhouette and osseous structures appear normal.       Impression:      There is no evidence for an active or acute abnormality of  the chest.     This report was finalized on 7/21/2023 8:59 PM by Dr. Martir Reza M.D.                 Assessment:        Near syncope  Recurrent near syncope  Orthostatic hypotension  Autonomic neuropathy  Fibromyalgia  GERD  Hyperlipidemia  Hypothyroidism  Migraine  Obesity  Severe depression  Anxiety disorder  Atypical chest pain  Urinary frequency      Plan:    No syncopal episodes since admission.  2D echo and carotid ultrasound reviewed.  Patient presents with multiple different complaints.  Had chest pain this morning, acute coronary syndrome ruled out, cardiology to see her son.  Patient is doing okay from a depression point with Cymbalta twice a day, however, she still feels anxious and may have somato symptoms.  We will add low-dose clonazepam instead of Xanax.  Patient agreed.  Ambulate  We will check urine for urinalysis and culture if needed.    Monitor and correct electrolytes  Continue DVT/stress ulcer prophylaxis  Labs in am      Geraldo Albright MD  7/23/2023  14:07 EDT

## 2023-07-23 NOTE — SIGNIFICANT NOTE
Declined eval today. States she is having a test soon and would like for therapy to try back later

## 2023-07-23 NOTE — PLAN OF CARE
Goal Outcome Evaluation:      C/o chest pain, work-up negative, clonazepam scheduled for anxiety, orthostatics improved, likely home soon with holter monitor.  Cardiology signed off.

## 2023-07-23 NOTE — CONSULTS
[facility]        Patient Name: Gabi Tomas  Age/Sex: 63 y.o. female  : 1960  MRN: [id number]    Date of Admission: 2023  Date of Encounter Visit: 23  Encounter Provider: Olvin Acuna MD  Referring Provider: Geraldo Albright MD  Place of Service: [facility] CARDIOLOGY  Patient Care Team:  Carrillo Drake MD as PCP - General (Family Medicine)    Subjective:     Chief Complaint: Lightheadedness and numbness and tingling in arms    Reason for consult: Possible syncope    History of Present Illness:  Gabi Tomas is a 63 y.o. female with history of autonomic dysfunction, fibromyalgia, cervical radiculopathy, immobility, migraines, neuropathy, obstructive sleep apnea, and history of kidney stones.  In , there was concern she had Parkinson since disease and was referred to [facility] movement disorder specialist; at that time it was felt she did not have Parkinson's but had dystonia.  Neuro cardio autonomic reflex testing was done at [facility] which was consistent with significant cardiovagal and cardiovascular adrenergic abnormalities.  A QSART study was also done which favored preganglionic/central autonomic disorder.  She was evaluated at the [facility] dysautonomia clinic.  She also had a thermoregulatory sweat test but cannot tolerate it for greater than 29 minutes.  Evaluation at [place] in 2021 showed mild orthostatic hypotension with mild to moderate impairment of the autonomic reflexes.    She presented to the emergency room on 2023 with lightheadedness and tingling in both hands.  Evaluation thus far has been unremarkable except for less than 50% right internal carotid artery stenosis; the left is normal.  She was noted to have orthostatic hypotension; /54 laying, /94 sitting, and /76 with heart rate 76 when standing.  We have been asked to see her for for  lightheadedness in the setting of her known autonomic dysfunction.  Her main complaints today are the numbness of bilateral hands.  That has been progressive.  Also she has an upcoming vascular evaluation due to inability to obtain blood pressures in her left upper extremity.  She has never fully passed out.    She had an echocardiogram in 2018 at [facility] which showed EF 66% and was otherwise normal.  Echocardiogram performed here on 2022 shows EF 61 to 65%, grade 1 LV diastolic dysfunction, and was otherwise unremarkable.        Prior Cardiac Testing:    Reviewed above    Past Medical History:  Past Medical History:   Diagnosis Date    Allergic     Anemia     Anxiety     Arthritis     Autonomic neuropathy     Cancer 1988    cervical    Cervical dysplasia     Conization in the past    Cervical mass     Depression     Difficulty walking     Eating disorder     Environmental allergies     Fibromyalgia, primary     GERD (gastroesophageal reflux disease)     Headache     Hyperlipidemia     Hypothyroidism     Kidney stone     Low back pain     Migraine     Obesity     Orthostatic hypertension     Peptic ulceration     Peripheral neuropathy     Shingles     Sleep apnea     Syncope     Tremor     Urinary tract infection     Visual impairment     glasses       Past Surgical History:   Procedure Laterality Date    CERVICAL CONIZATION       SECTION PRIMARY      CHOLECYSTECTOMY  2019    DILATATION AND CURETTAGE      HIATAL HERNIA REPAIR  2023    MEDIAL BRANCH BLOCK Bilateral 2022    Procedure: LUMBAR MEDIAL BRANCH BLOCK Bilateral ~L3-S1;  Surgeon: Rosie Cuevas MD;  [place] [facility] [place]  Service: Pain Management;  Laterality: Bilateral;    MEDIAL BRANCH BLOCK Bilateral 2022    Procedure: LUMBAR MEDIAL BRANCH BLOCK BILATERAL L3-S1 #2;  Surgeon: Rosie Cuevas MD;  [place] [facility] [place];  Service: Pain Management;  Laterality: Bilateral;    NERVE SURGERY Left  "02/01/2023    Procedure: LUMBAR RADIOFREQUENCY ABLATION LEFT L3-S1;  Surgeon: Rosie Cuevas MD;  Location: [facility];  Service: Pain Management;  Laterality: Left;    NERVE SURGERY Right 02/15/2023    Procedure: LUMBAR RADIOFREQUENCY ABLATION RIGHT L3-S1;  Surgeon: Rosie Cuevas MD;  Location: [facility]IN [facility];  Service: Pain Management;  Laterality: Right;    NISSEN FUNDOPLICATION LAPAROSCOPIC  04/19/2023       Home Medications:   Medications Prior to Admission   Medication Sig Dispense Refill Last Dose    DULoxetine (CYMBALTA) 30 MG capsule Take 1 capsule by mouth Daily. Takes this dose with the morning 60 mg dose.   7/21/2023 at 0900    DULoxetine (CYMBALTA) 60 MG capsule Take 1 capsule by mouth 2 (Two) Times a Day.   7/21/2023 at 0900    etodolac (LODINE) 400 MG tablet Take 1 tablet by mouth 2 (Two) Times a Day As Needed (pain). 60 tablet 0 7/21/2023 at 0900    fluticasone (FLONASE) 50 MCG/ACT nasal spray 2 sprays into the nostril(s) as directed by provider Daily.   7/21/2023 at 0900    gabapentin (NEURONTIN) 800 MG tablet TAKE 1 TABLET BY MOUTH THREE TIMES DAILY (Patient taking differently: 1 tablet 2 (Two) Times a Day. Pt reports she only takes am and PM dose;) 90 tablet 1 Patient Taking Differently at 0900    montelukast (SINGULAIR) 10 MG tablet Take 1 tablet by mouth.   7/20/2023 at 2100    vitamin B-12 (CYANOCOBALAMIN) 1000 MCG tablet TAKE 1 TABLET BY MOUTH EVERY DAY 30 tablet 5 7/21/2023 at 0900    vitamin D (ERGOCALCIFEROL) 1.25 MG (40452 UT) capsule capsule Take 1 capsule by mouth 1 (One) Time Per Week. Mondays   Past Week    ALPRAZolam (XANAX) 0.5 MG tablet TK 1 T PO  BID PRN  5 More than a month    ondansetron ODT (ZOFRAN-ODT) 4 MG disintegrating tablet As Needed.   Unknown       Allergies:  Allergies   Allergen Reactions    Ampicillin Anaphylaxis, Rash and Other (See Comments)    Cyclobenzaprine Other (See Comments)     \"made me sleep for 2 days\"    Levofloxacin Unknown - Low Severity    " Milnacipran Other (See Comments)     Palpitations, rectal bleeding    Quinolones Other (See Comments)     Neurological issues     Savella [Milnacipran Hcl] Unknown - High Severity    Sulfa Antibiotics Angioedema    Pregabalin Palpitations and Other (See Comments)       Past Social History:  Social History     Socioeconomic History    Marital status:    Tobacco Use    Smoking status: Some Days     Packs/day: 0.25     Years: 42.00     Pack years: 10.50     Types: Cigarettes    Smokeless tobacco: Never   Vaping Use    Vaping Use: Never used   Substance and Sexual Activity    Alcohol use: Yes     Comment: rare    Drug use: No    Sexual activity: Defer       Past Family History:  Family History   Problem Relation Age of Onset    Lymphoma Mother     Depression Mother     Migraines Mother     Cancer Father     Heart attack Father     Psoriasis Father     Alcohol abuse Father     Aneurysm Brother     COPD Brother     Heart disease Brother     Heart attack Brother     Migraines Son     Cancer Maternal Aunt     Cancer Maternal Uncle     Cancer Paternal Aunt     Heart disease Paternal Aunt     Cancer Paternal Uncle     Heart disease Paternal Uncle     Aneurysm Brother     Lung disease Brother     Alcohol abuse Brother     ADD / ADHD Son     Migraines Son        REVIEW OF SYSTEMS:   14 point ROS was performed and is negative except as outlined in HPI          Objective:   Temp:  [97.5 °F (36.4 °C)-98.6 °F (37 °C)] 98.4 °F (36.9 °C)  Heart Rate:  [63-80] 80  Resp:  [18] 18  BP: ()/(58-79) 112/73     Intake/Output Summary (Last 24 hours) at 7/23/2023 1550  Last data filed at 7/23/2023 1542  Gross per 24 hour   Intake 0 ml   Output --   Net 0 ml     Body mass index is 36.84 kg/m².      07/21/23  1900 07/22/23  0913   Weight: 94.3 kg (208 lb) 94.3 kg (207 lb 14.3 oz)     Weight change: -0.048 kg (-1.7 oz)    General Appearance:    Alert, cooperative, in no acute distress, resting in bed   Head:    Normocephalic,  without obvious abnormality, atraumatic   Eyes:            Lids and lashes normal, conjunctivae and sclerae normal, no   icterus, no pallor, corneas clear, PERRLA   Ears:    Ears appear intact with no abnormalities noted   Throat:   No oral lesions, no thrush, oral mucosa moist   Neck:   No adenopathy, supple, trachea midline, no thyromegaly, no   carotid bruit, no JVD   Back:     No kyphosis present, no scoliosis present, no skin lesions, erythema or scars, no tenderness to percussion or palpation, range of motion normal   Lungs:     Clear to auscultation,respirations regular, even and unlabored    Heart:    Regular rhythm and normal rate, normal S1 and S2, no murmur, no gallop, no rub, no click   Chest Wall:    No abnormalities observed   Abdomen:     Normal bowel sounds, no masses, no organomegaly, soft, nontender, nondistended, no guarding, no rebound  tenderness   Extremities:   Moves all extremities well, no edema, no cyanosis, no redness   Pulses: Bilateral radial pulses palpable, 2+.  There is not appear to be any delay of the left radial pulse.  Normal capillary refill bilateral hands.  Peripheral perfusion appears normal.. Normal radial, carotid, femoral, dorsalis pedis and posterior tibial pulses bilaterally. Normal abdominal aorta   Skin:  Psychiatric:   No bleeding, bruising or rash    Alert and oriented x 3, normal mood and affect     Lab Review:   Results from last 7 days   Lab Units 07/23/23  0437 07/22/23 0440 07/21/23 2003   SODIUM mmol/L 141 144 143   POTASSIUM mmol/L 4.0 3.9 4.1   CHLORIDE mmol/L 108* 110* 108*   CO2 mmol/L 21.0* 22.7 23.8   BUN mg/dL 14 11 12   CREATININE mg/dL 0.73 0.70 0.79   GLUCOSE mg/dL 107* 90 99   CALCIUM mg/dL 9.4 8.9 9.3   AST (SGOT) U/L  --   --  26   ALT (SGPT) U/L  --   --  27     Results from last 7 days   Lab Units 07/23/23  1217 07/23/23  1018 07/21/23 2003   HSTROP T ng/L <6 <6 10*     Results from last 7 days   Lab Units 07/23/23  0437 07/22/23 0440  07/21/23 2003   WBC 10*3/mm3 5.40 4.97 5.95   HEMOGLOBIN g/dL 14.8 13.8 15.0   HEMATOCRIT % 44.8 40.8 44.3   PLATELETS 10*3/mm3 262 244 264                   Invalid input(s): LDLCALC      Results from last 7 days   Lab Units 07/21/23 2003   TSH uIU/mL 3.580       Echo EF Estimated  Lab Results   Component Value Date    ECHOEFEST 60 04/18/2017       EKG:   I personally viewed and interpreted the patient's EKG          Imaging:  Imaging Results (Most Recent)       Procedure Component Value Units Date/Time    XR Chest 2 View [[id number]] Collected: 07/21/23 2009     Updated: 07/21/23 2102    Narrative:      TWO VIEWS OF THE CHEST     HISTORY: 63-year-old female with a history of chest pain.     FINDINGS: PA and lateral chest radiographs were obtained. Comparison is  made to a chest radiograph dated 12/22/2022. The lungs are normal in  volume and are clear of consolidation. Mild subpleural interstitial  scarring is seen at the base of the left lung. The cardiomediastinal  silhouette and osseous structures appear normal.       Impression:      There is no evidence for an active or acute abnormality of  the chest.     This report was finalized on 7/21/2023 8:59 PM by Dr. Martir Reza M.D.                   Assessment:     Active Hospital Problems    Diagnosis  POA    **Near syncope [R55]  Yes      Resolved Hospital Problems   No resolved problems to display.          Plan:     Lightheadedness: Multifactorial, she has had positive orthostatics in the past, but orthostatics obtained today were largely unremarkable.  She does have known autonomic dysfunction, was previously followed at the [facility] and [place] for this.  Her echocardiogram, which I reviewed, is normal, I do not see a cardiac contribution to her current symptoms, and given the lack of marked orthostatics, I am not sure that this is driving her symptoms.  I be hesitant to add any further medications like midodrine or anything along those lines  given the symptoms do not quite match to the degree of blood pressure change.  We will arrange for a 24-hour Holter.  Patient does state that she has some intermittent palpitations, will see if there is any arrhythmia correlates  If her Holter is normal, then no further cardiac testing is needed, we will follow the Holter results peripherally.  Reviewed echo.  Normal  She does appear that some of her lightheaded symptoms did increase after recently increasing her Cymbalta.  Chest pain: She is complained of intermittent, vague chest pain while hospitalized.  She does have a history of fibromyalgia.  While having chest pain, she had an EKG obtained that was unremarkable and she has had 2 undetectably negative high-sensitivity troponins which rules out cardiac cause.  Concerns for diminished pulses left upper extremity: Her left radial pulse is normal.  Her carotid ultrasound does not show evidence of subclavian steal.  She can follow-up with vascular for this, but given her normal pulses and normal peripheral perfusion I do not suspect ischemia as driving her extremity numbness.    Thank you for the consult.  No other inpatient cardiac work-up required at this time, cardiology will sign off.  We will follow her outpatient 24-hour Holter results peripherally, if there is abnormalities we will arrange for outpatient cardiology follow-up, otherwise she can follow-up with her primary care doctor for these ongoing, likely neuropathic, issues     Olvin Acuna MD  [facility]  07/23/23  09:12 EDT      Part of this note may be an electronic transcription/translation of spoken language to printed text using the Dragon Dictation System.

## 2023-07-24 ENCOUNTER — READMISSION MANAGEMENT (OUTPATIENT)
Dept: CALL CENTER | Facility: HOSPITAL | Age: 63
End: 2023-07-24
Payer: MEDICARE

## 2023-07-24 VITALS
HEIGHT: 63 IN | WEIGHT: 207.89 LBS | SYSTOLIC BLOOD PRESSURE: 138 MMHG | BODY MASS INDEX: 36.84 KG/M2 | HEART RATE: 74 BPM | OXYGEN SATURATION: 97 % | DIASTOLIC BLOOD PRESSURE: 84 MMHG | RESPIRATION RATE: 18 BRPM | TEMPERATURE: 97.3 F

## 2023-07-24 LAB
ANION GAP SERPL CALCULATED.3IONS-SCNC: 10.1 MMOL/L (ref 5–15)
BUN SERPL-MCNC: 14 MG/DL (ref 8–23)
BUN/CREAT SERPL: 17.9 (ref 7–25)
CALCIUM SPEC-SCNC: 9.3 MG/DL (ref 8.6–10.5)
CHLORIDE SERPL-SCNC: 108 MMOL/L (ref 98–107)
CO2 SERPL-SCNC: 22.9 MMOL/L (ref 22–29)
CREAT SERPL-MCNC: 0.78 MG/DL (ref 0.57–1)
DEPRECATED RDW RBC AUTO: 42.6 FL (ref 37–54)
EGFRCR SERPLBLD CKD-EPI 2021: 86.5 ML/MIN/1.73
ERYTHROCYTE [DISTWIDTH] IN BLOOD BY AUTOMATED COUNT: 13 % (ref 12.3–15.4)
GLUCOSE SERPL-MCNC: 97 MG/DL (ref 65–99)
HCT VFR BLD AUTO: 43.3 % (ref 34–46.6)
HGB BLD-MCNC: 14.6 G/DL (ref 12–15.9)
MCH RBC QN AUTO: 30.2 PG (ref 26.6–33)
MCHC RBC AUTO-ENTMCNC: 33.7 G/DL (ref 31.5–35.7)
MCV RBC AUTO: 89.6 FL (ref 79–97)
PLATELET # BLD AUTO: 247 10*3/MM3 (ref 140–450)
PMV BLD AUTO: 11.2 FL (ref 6–12)
POTASSIUM SERPL-SCNC: 3.8 MMOL/L (ref 3.5–5.2)
RBC # BLD AUTO: 4.83 10*6/MM3 (ref 3.77–5.28)
SODIUM SERPL-SCNC: 141 MMOL/L (ref 136–145)
WBC NRBC COR # BLD: 4.67 10*3/MM3 (ref 3.4–10.8)

## 2023-07-24 PROCEDURE — 80048 BASIC METABOLIC PNL TOTAL CA: CPT | Performed by: INTERNAL MEDICINE

## 2023-07-24 PROCEDURE — 96372 THER/PROPH/DIAG INJ SC/IM: CPT

## 2023-07-24 PROCEDURE — 85027 COMPLETE CBC AUTOMATED: CPT | Performed by: INTERNAL MEDICINE

## 2023-07-24 PROCEDURE — 25010000002 ENOXAPARIN PER 10 MG: Performed by: INTERNAL MEDICINE

## 2023-07-24 PROCEDURE — G0378 HOSPITAL OBSERVATION PER HR: HCPCS

## 2023-07-24 RX ORDER — GABAPENTIN 800 MG/1
800 TABLET ORAL 2 TIMES DAILY
Qty: 60 TABLET | Refills: 0 | Status: SHIPPED | OUTPATIENT
Start: 2023-07-24

## 2023-07-24 RX ORDER — LIDOCAINE 40 MG/G
1 CREAM TOPICAL 4 TIMES DAILY PRN
Qty: 15 G | Refills: 3 | Status: SHIPPED | OUTPATIENT
Start: 2023-07-24

## 2023-07-24 RX ORDER — LIDOCAINE 40 MG/G
1 CREAM TOPICAL AS NEEDED
Status: DISCONTINUED | OUTPATIENT
Start: 2023-07-24 | End: 2023-07-24 | Stop reason: HOSPADM

## 2023-07-24 RX ORDER — CLONAZEPAM 0.5 MG/1
0.5 TABLET ORAL EVERY 12 HOURS SCHEDULED
Qty: 28 TABLET | Refills: 0 | Status: SHIPPED | OUTPATIENT
Start: 2023-07-24 | End: 2023-08-07

## 2023-07-24 RX ADMIN — FLUTICASONE PROPIONATE 2 SPRAY: 50 SPRAY, METERED NASAL at 09:57

## 2023-07-24 RX ADMIN — MONTELUKAST SODIUM 10 MG: 10 TABLET, FILM COATED ORAL at 09:57

## 2023-07-24 RX ADMIN — DULOXETINE HYDROCHLORIDE 60 MG: 60 CAPSULE, DELAYED RELEASE ORAL at 09:57

## 2023-07-24 RX ADMIN — Medication 1000 MCG: at 09:57

## 2023-07-24 RX ADMIN — ENOXAPARIN SODIUM 40 MG: 100 INJECTION SUBCUTANEOUS at 09:57

## 2023-07-24 RX ADMIN — CLONAZEPAM 0.5 MG: 0.5 TABLET ORAL at 09:57

## 2023-07-24 RX ADMIN — GABAPENTIN 800 MG: 400 CAPSULE ORAL at 09:57

## 2023-07-24 RX ADMIN — LIDOCAINE 4% 1 APPLICATION: 4 CREAM TOPICAL at 15:43

## 2023-07-24 RX ADMIN — ACETAMINOPHEN 650 MG: 325 TABLET, FILM COATED ORAL at 12:08

## 2023-07-24 RX ADMIN — Medication 10 ML: at 09:57

## 2023-07-24 NOTE — CASE MANAGEMENT/SOCIAL WORK
Discharge Planning Assessment  [facility]     Patient Name: Gabi Tomas  MRN: [id number]  Today's Date: 7/24/2023    Admit Date: 7/21/2023    Plan: Home with HH - referral pending   Discharge Needs Assessment       Row Name 07/24/23 134       Living Environment    People in Home child(lenin), adult;spouse    Name(s) of People in Home Spouse, Edilberto and son    Current Living Arrangements home    Potentially Unsafe Housing Conditions none    Primary Care Provided by self    Provides Primary Care For no one    Family Caregiver if Needed spouse    Family Caregiver Names SpouseEdilberto [phone]    Quality of Family Relationships helpful;involved;supportive    Able to Return to Prior Arrangements yes       Resource/Environmental Concerns    Resource/Environmental Concerns none       Transition Planning    Patient/Family Anticipates Transition to home with family;home with help/services    Patient/Family Anticipated Services at Transition none    Transportation Anticipated family or friend will provide       Discharge Needs Assessment    Equipment Currently Used at Home cane, straight;walker, standard    Concerns to be Addressed discharge planning                   Discharge Plan       Row Name 07/24/23 2519       Plan    Plan Home with HH - referral pending    Patient/Family in Agreement with Plan yes    Plan Comments CCP spoke with patient at bedside; CCP role explained, face sheet verified, and discharge plan discussed. Patient resides with spouse and son in two-level home with three steps to enter through the front and four steps to enter through the back. Patient states she primarily resides on main level and does not go upstairs often. Patient uses cane and walker to ambulate. Patient inquiring about getting oxygen. CCP explained that oxygen would not be covered through insurance and would be private pay. Patient declined and states she will follow-up with PCP if she wants oxygen in future. Confirms  pharmacy is [facility] on [address]. Patient has used HH services in the past, but cannot recall name of agency. Denies any SNF history, but has been to [facility] for outpatient physical therapy in the past. Patient ambulating 50ft with PT and wanting to go home with home health. Patient does not have HH agency preference; referral sent to [facility] (pending). Patient states family will transport home. CCP to follow. Dilip TOLBERT LCSW                  Continued Care and Services - Admitted Since 7/21/2023    Coordination has not been started for this encounter.          Demographic Summary       Row Name 07/24/23 1342       General Information    Admission Type observation    Arrived From home    Reason for Consult discharge planning    Preferred Language English                   Functional Status       Row Name 07/24/23 1342       Functional Status    Usual Activity Tolerance moderate    Current Activity Tolerance moderate       Functional Status, IADL    Medications assistive equipment    Meal Preparation assistive equipment    Housekeeping assistive equipment    Laundry assistive equipment    Shopping assistive equipment       Mental Status    General Appearance WDL WDL                   Psychosocial    No documentation.                  Abuse/Neglect    No documentation.                  Legal    No documentation.                  Substance Abuse    No documentation.                  Patient Forms    No documentation.                     Dilip TOLBERT LCSW

## 2023-07-24 NOTE — PLAN OF CARE
Goal Outcome Evaluation:         Vitals stable. Neuropathy pain in hands - started on lidocaine cream. Possible discharge later today. Will continue to monitor.

## 2023-07-24 NOTE — SIGNIFICANT NOTE
07/24/23 1542   OTHER   Discipline physical therapy assistant   Rehab Time/Intention   Session Not Performed patient/family declined, not feeling well  (Pt refused PT this PM stating the neuropathy in her fingers was very painful. PT will follow up tomorrow.)   Recommendation   PT - Next Appointment 07/25/23

## 2023-07-24 NOTE — DISCHARGE SUMMARY
PHYSICIAN DISCHARGE SUMMARY  [facility], [facility]    Patient Identification:    Name: Gabi Tomas  Age: 63 y.o.  Sex: female  :  1960  MRN: [id number]    Primary Care Physician: Carrillo Drake MD    Admit date: 2023    Discharge date and time:2023    Discharged Condition: good    Discharge Diagnoses:    Near syncope  Recurrent near syncope  Orthostatic hypotension  Autonomic neuropathy  Fibromyalgia  GERD  Hyperlipidemia  Hypothyroidism  Migraine  Obesity  Severe depression  Anxiety disorder  Atypical chest pain  Urinary frequency         Hospital Course: Gabi Tomas  is a  63-year-old female.  Patient has history of autonomic neuropathy, fibromyalgia, GERD, hyperlipidemia, hypothyroidism, obesity, orthostatic hypotension, syncope, lumbosacral spondylosis, Parkinson's disease, migraine headache, cervical radiculopathy, chronic pain syndrome.  Patient has developed several episodes of lightheadedness, and feeling that she was going to pass out, also he has developed some confusion during these episodes..  Also, patient has been told that she has a decreased pulse on the left arm.  In the ER, patient was found to have: Creatinine 0.79, potassium 4.1, WBC 5.95, hemoglobin 15.0.  Chest ray was negative.  Patient was placed in the hospital for further management.     Patient was placed in observation.  Work-up for syncope was done and cardiology was consulted.  It was felt that patient's autonomic neuropathy symptoms got worse after her Cymbalta was increased to 90 mg in the morning and 60 in the evening.  This was switched to 60 mg twice a day.  Cardiology was consulted, had an echocardiogram which was normal, Holter monitor was advised at the time of discharge.  During the hospitalization, patient complained of chest pain which were vague, EKG and troponin came back negative.  Patient also complained of decreased pulses in the left upper extremity,  arterial Doppler came back normal.  Patient was very anxious, it appears to be that most of his symptoms were somatic symptoms.  Patient was given low-dose clonazepam twice a day and felt much better.  At this time, patient is okay to be discharged.  Patient is to see her primary care physician in a week.  Patient is to follow instruction for Holter monitor.    PMHX:   Past Medical History:   Diagnosis Date    Allergic     Anemia     Anxiety     Arthritis     Autonomic neuropathy     Cancer 1988    cervical    Cervical dysplasia     Conization in the past    Cervical mass     Depression     Difficulty walking     Eating disorder     Environmental allergies     Fibromyalgia, primary     GERD (gastroesophageal reflux disease)     Headache     Hyperlipidemia     Hypothyroidism     Kidney stone     Low back pain     Migraine     Obesity     Orthostatic hypertension     Peptic ulceration     Peripheral neuropathy     Shingles     Sleep apnea     Syncope     Tremor     Urinary tract infection     Visual impairment     glasses     PSHX:   Past Surgical History:   Procedure Laterality Date    CERVICAL CONIZATION       SECTION PRIMARY      CHOLECYSTECTOMY  2019    DILATATION AND CURETTAGE      HIATAL HERNIA REPAIR  2023    MEDIAL BRANCH BLOCK Bilateral 2022    Procedure: LUMBAR MEDIAL BRANCH BLOCK Bilateral ~L3-S1;  Surgeon: Rosie Cuevas MD;  [place] [facility]  Service: Pain Management;  Laterality: Bilateral;    MEDIAL BRANCH BLOCK Bilateral 2022    Procedure: LUMBAR MEDIAL BRANCH BLOCK BILATERAL L3-S1 #2;  Surgeon: Rosie Cuevas MD;  [place] [facility] [place];  Service: Pain Management;  Laterality: Bilateral;    NERVE SURGERY Left 2023    Procedure: LUMBAR RADIOFREQUENCY ABLATION LEFT L3-S1;  Surgeon: Rosie Cuevas MD;  [place] [facility] [place];  Service: Pain Management;  Laterality: Left;    NERVE SURGERY Right 02/15/2023    Procedure: LUMBAR RADIOFREQUENCY ABLATION RIGHT  "L3-S1;  Surgeon: Rosie Cuevas MD;  Location: [facility] [place] [place];  Service: Pain Management;  Laterality: Right;    NISSEN FUNDOPLICATION LAPAROSCOPIC  04/19/2023           Consults:     Consults       Date and Time Order Name Status Description    7/22/2023  8:49 AM Inpatient Cardiology Consult Completed     7/21/2023  8:44 PM Family Medicine Consult              Discharge Exam:    /84 (BP Location: Right arm, Patient Position: Lying)   Pulse 74   Temp 97.3 °F (36.3 °C) (Oral)   Resp 18   Ht 160 cm (62.99\")   Wt 94.3 kg (207 lb 14.3 oz)   SpO2 97%   BMI 36.84 kg/m²     General: Alert, cooperative, no distress, appears stated age.  Less anxious.  Neck: Supple, symmetrical, trachea midline, no adenopathy;              thyroid:  no enlargement/tenderness/nodules;              no carotid bruit or JVD  Cardiovascular: Normal rate, regular rhythm and intact distal pulses.              Exam reveals no gallop and no friction rub. No murmur heard  Pulmonary: Clear to auscultation bilaterally, respirations unlabored.               No rhonchi, wheezing or rales.   Abdominal: Soft. Soft, nontender, bowel sounds active all four quadrants,     no masses, no hepatomegaly, no splenomegaly.   Extremities: Normal, atraumatic, no cyanosis or edema  Pulses: 2 + symmetric all extremities  Neurological: Patient is alert and oriented to person, place, and time.  No new focal sensorimotor deficit.  Skin: Skin color, texture, normal. Turgor is decreased. No rashes or lesions    Data Review:        Results from last 7 days   Lab Units 07/24/23 0445 07/23/23 0437 07/22/23 0440   WBC 10*3/mm3 4.67 5.40 4.97   HEMOGLOBIN g/dL 14.6 14.8 13.8   HEMATOCRIT % 43.3 44.8 40.8   PLATELETS 10*3/mm3 247 262 244       Results from last 7 days   Lab Units 07/24/23 0445 07/23/23 0437 07/22/23 0440 07/21/23 2003   SODIUM mmol/L 141 141 144 143   POTASSIUM mmol/L 3.8 4.0 3.9 4.1   CHLORIDE mmol/L 108* 108* 110* 108*   CO2 mmol/L 22.9 21.0* " 22.7 23.8   BUN mg/dL 14 14 11 12   CREATININE mg/dL 0.78 0.73 0.70 0.79   CALCIUM mg/dL 9.3 9.4 8.9 9.3   BILIRUBIN mg/dL  --   --   --  0.5   ALK PHOS U/L  --   --   --  86   ALT (SGPT) U/L  --   --   --  27   AST (SGOT) U/L  --   --   --  26   GLUCOSE mg/dL 97 107* 90 99           Lab Results   Lab Value Date/Time    TROPONINT <6 07/23/2023 1217    TROPONINT <6 07/23/2023 1018    TROPONINT 10 (H) 07/21/2023 2003    TROPONINT <0.010 12/22/2022 1857    TROPONINT <0.010 01/04/2017 2122       Microbiology Results (last 10 days)       ** No results found for the last 240 hours. **             Imaging Results (All)       Procedure Component Value Units Date/Time    XR Chest 2 View [[id number]] Collected: 07/21/23 2009     Updated: 07/21/23 2102    Narrative:      TWO VIEWS OF THE CHEST     HISTORY: 63-year-old female with a history of chest pain.     FINDINGS: PA and lateral chest radiographs were obtained. Comparison is  made to a chest radiograph dated 12/22/2022. The lungs are normal in  volume and are clear of consolidation. Mild subpleural interstitial  scarring is seen at the base of the left lung. The cardiomediastinal  silhouette and osseous structures appear normal.       Impression:      There is no evidence for an active or acute abnormality of  the chest.     This report was finalized on 7/21/2023 8:59 PM by Dr. Martir Reza M.D.                 Disposition:    Home    Patient Instructions:        Discharge Medications        New Medications        Instructions Start Date   clonazePAM 0.5 MG tablet  Commonly known as: KlonoPIN   0.5 mg, Oral, Every 12 Hours Scheduled      lidocaine 4 % cream  Commonly known as: LMX   1 application , Topical, 4 Times Daily PRN             Changes to Medications        Instructions Start Date   DULoxetine 60 MG capsule  Commonly known as: CYMBALTA  What changed: Another medication with the same name was removed. Continue taking this medication, and follow the directions  you see here.   1 capsule, Oral, 2 Times Daily      gabapentin 800 MG tablet  Commonly known as: NEURONTIN  What changed:   when to take this  additional instructions   800 mg, Oral, 2 Times Daily, Pt reports she only takes am and PM dose;             Continue These Medications        Instructions Start Date   etodolac 400 MG tablet  Commonly known as: LODINE   400 mg, Oral, 2 Times Daily PRN      fluticasone 50 MCG/ACT nasal spray  Commonly known as: FLONASE   2 sprays, Nasal, Daily      montelukast 10 MG tablet  Commonly known as: SINGULAIR   10 mg, Oral      ondansetron ODT 4 MG disintegrating tablet  Commonly known as: ZOFRAN-ODT   As Needed      vitamin B-12 1000 MCG tablet  Commonly known as: CYANOCOBALAMIN   TAKE 1 TABLET BY MOUTH EVERY DAY      vitamin D 1.25 MG (58740 UT) capsule capsule  Commonly known as: ERGOCALCIFEROL   50,000 Units, Oral, Weekly, Mondays             Stop These Medications      ALPRAZolam 0.5 MG tablet  Commonly known as: XANAX              Discharge Order (From admission, onward)       Start     Ordered    07/24/23 1711  Discharge patient  Once        Expected Discharge Date: 07/24/23    Expected Discharge Time: Afternoon    Discharge Disposition: Home or Self Care    Physician of Record for Attribution - Please select from Treatment Team: DAMIÁN KENYON [[id number]]    Review needed by CMO to determine Physician of Record: No       Question Answer Comment   Physician of Record for Attribution - Please select from Treatment Team DAMIÁN KENYON    Review needed by CMO to determine Physician of Record No        07/24/23 1718                     Follow-up Information       Carrillo Drake MD Follow up in 1 week(s).    Specialty: Family Medicine  Contact information:  [address]  [facility] [address] [phone]                             Total time spent discharging patient including evaluation,post hospitalization follow up,  medication and post hospitalization instructions and  education total time exceeds 30 minutes.    Signed:  Geraldo Albright MD  7/24/2023  17:18 EDT       I wore protective equipment throughout this patient encounter including a face mask, gloves, and protective eyewear.  Hand hygiene was performed before donning protective equipment and after removal when leaving the room.

## 2023-07-24 NOTE — PLAN OF CARE
Goal Outcome Evaluation:   Vitals stable with no issues. Patient is independent in ADLs. No complaints of pain or nausea this shift. Increased cymbalta dose administered per MAR. Possible D/C home today. Patient is resting comfortably with no further issues

## 2023-07-24 NOTE — DISCHARGE PLACEMENT REQUEST
"Willem Tomas (63 y.o. Female)       Date of Birth   1960    Social Security Number       Address   [address] [place] [address]    Home Phone   [phone]    MRN   [id number]       [employer]   P[employer]ecostal    Marital Status                               Admission Date   7/21/23    Admission Type   Emergency    Admitting Provider   Geraldo Albright MD    Attending Provider   Geraldo Albright MD    Department, Room/Bed   [address], [address]       Discharge Date       Discharge Disposition       Discharge Destination                                 Attending Provider: Geraldo Albright MD    Allergies: Ampicillin, Cyclobenzaprine, Levofloxacin, Milnacipran, Quinolones, Savella [Milnacipran Hcl], Sulfa Antibiotics, Pregabalin    Isolation: None   Infection: None   Code Status: Not on file    Ht: 160 cm (62.99\")   Wt: 94.3 kg (207 lb 14.3 oz)    Admission Cmt: None   Principal Problem: Near syncope [R55]                   Active Insurance as of 7/21/2023       Primary Coverage       Payor Plan Insurance Group Employer/Plan Group    MEDICARE MEDICARE A & B        Payor Plan Address Payor Plan Phone Number Payor Plan Fax Number Effective Dates    [address] [phone]  9/1/2016 - None Entered    [facility] [id number]         Subscriber Name Subscriber Birth Date Member ID       WILLEM TOMAS 1960 [id number]               Secondary Coverage       Payor Plan Insurance Group Employer/Plan Group    [employer] PLAN F       Payor Plan Address Payor Plan Phone Number Payor Plan Fax Number Effective Dates    [address] [phone]  1/1/2019 - None Entered    [facility] [id number]         Subscriber Name Subscriber Birth Date Member ID       WILLEM TOMAS 1960 [id number]                     Emergency Contacts        (Rel.) Home Phone Work Phone Mobile Phone    Edilberto oTmas (Spouse) [phone] -- " [phone]

## 2023-07-25 ENCOUNTER — HOME HEALTH ADMISSION (OUTPATIENT)
Dept: HOME HEALTH SERVICES | Facility: HOME HEALTHCARE | Age: 63
End: 2023-07-25
Payer: MEDICARE

## 2023-07-25 NOTE — CASE MANAGEMENT/SOCIAL WORK
Case Management Discharge Note      Final Note: Home. Referral to [facility] accepted. Per Irene/ZANA when she called pt was told that pt does not want  services so [facility] signed off. Transport by family.         Selected Continued Care - Discharged on 7/24/2023 Admission date: 7/21/2023 - Discharge disposition: Home or Self Care      Destination    No services have been selected for the patient.                Durable Medical Equipment    No services have been selected for the patient.                Dialysis/Infusion    No services have been selected for the patient.                Home Medical Care    No services have been selected for the patient.                Therapy    No services have been selected for the patient.                Community Resources    No services have been selected for the patient.                Community & DME    No services have been selected for the patient.                    Transportation Services  Private: Car    Final Discharge Disposition Code: 01 - home or self-care

## 2023-07-25 NOTE — OUTREACH NOTE
Prep Survey      Flowsheet Row Responses   [facility] facility patient discharged from? [place]   Is LACE score < 7 ? No   Eligibility Readm Mgmt   Discharge diagnosis Near syncope   Does the patient have one of the following disease processes/diagnoses(primary or secondary)? Other   Does the patient have Home health ordered? Yes   What is the Home health agency?  [facility]   Is there a DME ordered? No   Prep survey completed? Yes            Toshia HARRINGTON - Registered Nurse

## 2023-07-27 ENCOUNTER — OFFICE VISIT (OUTPATIENT)
Dept: BEHAVIORAL HEALTH | Facility: CLINIC | Age: 63
End: 2023-07-27
Payer: MEDICARE

## 2023-07-27 DIAGNOSIS — F32.A CHRONIC DEPRESSION: Primary | ICD-10-CM

## 2023-07-28 ENCOUNTER — READMISSION MANAGEMENT (OUTPATIENT)
Dept: CALL CENTER | Facility: HOSPITAL | Age: 63
End: 2023-07-28
Payer: MEDICARE

## 2023-07-28 NOTE — OUTREACH NOTE
Medical Week 1 Survey      Flowsheet Row Responses   [facility] patient discharged from? [facility]   Does the patient have one of the following disease processes/diagnoses(primary or secondary)? Other   Week 1 attempt successful? No   Unsuccessful attempts Attempt 1            Barbara Flores Registered Nurse

## 2023-08-01 LAB — QT INTERVAL: 374 MS

## 2023-08-01 NOTE — DISCHARGE INSTRUCTIONS
[facility] - Post-procedure Instructions          --  While there are no absolute restrictions, it is recommended that you do not perform strenuous activity today. In the morning, you may resume your level of activity as before your block.    --  If you have a band-aid at your injection site, please remove it later today. Observe the area for any redness, swelling, pus-like drainage, or a temperature over 101ø. If any of these symptoms occur, please call your doctor at [phone]. If after office hours, leave a message and the on-call provider will return your call.    --  Ice may be applied to your injection site. It is recommended you avoid direct heat (heating pad; hot tub) for 1-2 days.    --  Call [facility] at [phone] if you experience persistent headache, persistent bleeding from the injection site, or severe pain not relieved by heat or oral medication.    --  Do not make important decisions today.    --  Due to the effects of the block and/or the I.V. Sedation, DO NOT drive or operate hazardous machinery for 12 hours.  Local anesthetics may cause numbness after procedure and precautions must be taken with regards to operating equipment as well as with walking, even if ambulating with assistance of another person or with an assistive device.    --  Do not drink alcohol for 12 hours.    -- You may return to work tomorrow, or as directed by your referring doctor.    --  Occasionally you may notice a slight increase in your pain after the procedure. This should start to improve within the next 24-48 hours. Radiofrequency ablation procedure pain may last 3-4 weeks.    --  It may take as long as 3-4 days before you notice a gradual improvement in your pain and/or other symptoms.    -- You may continue to take your prescribed pain medication as needed.    --  Some normal possible side effects of steroid use could include fluid retention, increased blood sugar, dull headache,  increased sweating, increased appetite, mood swings and flushing.    --  Diabetics are recommended to watch their blood glucose level closely for 24-48 hours after the injection.    --  Must stay in PACU for 20 min upon arrival and prove no leg weakness before being discharged.    --  IN THE EVENT OF A LIFE THREATENING EMERGENCY, (CHEST PAIN, BREATHING DIFFICULTIES, PARALYSIS.) YOU SHOULD GO TO YOUR NEAREST EMERGENCY ROOM.    --  You should be contacted by our office within 2-3 days to schedule follow up or next appointment date.  If not contacted within 7 days, please call the office at [phone]

## 2023-08-02 ENCOUNTER — HOSPITAL ENCOUNTER (OUTPATIENT)
Facility: SURGERY CENTER | Age: 63
Setting detail: HOSPITAL OUTPATIENT SURGERY
Discharge: HOME | End: 2023-08-02
Attending: ANESTHESIOLOGY | Admitting: ANESTHESIOLOGY
Payer: MEDICARE

## 2023-08-02 ENCOUNTER — READMISSION MANAGEMENT (OUTPATIENT)
Dept: CALL CENTER | Facility: HOSPITAL | Age: 63
End: 2023-08-02
Payer: MEDICARE

## 2023-08-02 ENCOUNTER — HOSPITAL ENCOUNTER (OUTPATIENT)
Dept: GENERAL RADIOLOGY | Facility: SURGERY CENTER | Age: 63
Setting detail: HOSPITAL OUTPATIENT SURGERY
End: 2023-08-02
Payer: MEDICARE

## 2023-08-02 VITALS
SYSTOLIC BLOOD PRESSURE: 117 MMHG | HEIGHT: 63 IN | RESPIRATION RATE: 20 BRPM | HEART RATE: 68 BPM | DIASTOLIC BLOOD PRESSURE: 75 MMHG | BODY MASS INDEX: 36.68 KG/M2 | TEMPERATURE: 97.8 F | OXYGEN SATURATION: 97 % | WEIGHT: 207 LBS

## 2023-08-02 DIAGNOSIS — Z41.9 SURGERY, ELECTIVE: ICD-10-CM

## 2023-08-02 PROCEDURE — 77002 NEEDLE LOCALIZATION BY XRAY: CPT

## 2023-08-02 PROCEDURE — 25510000001 IOPAMIDOL 61 % SOLUTION 30 ML VIAL: Performed by: ANESTHESIOLOGY

## 2023-08-02 PROCEDURE — 25010000002 DEXAMETHASONE SODIUM PHOSPHATE 100 MG/10ML SOLUTION 10 ML VIAL: Performed by: ANESTHESIOLOGY

## 2023-08-02 PROCEDURE — 76000 FLUOROSCOPY <1 HR PHYS/QHP: CPT

## 2023-08-02 PROCEDURE — 62321 NJX INTERLAMINAR CRV/THRC: CPT | Performed by: ANESTHESIOLOGY

## 2023-08-02 RX ORDER — DIAZEPAM 5 MG/1
5 TABLET ORAL ONCE
Status: COMPLETED | OUTPATIENT
Start: 2023-08-02 | End: 2023-08-02

## 2023-08-02 RX ADMIN — DIAZEPAM 5 MG: 5 TABLET ORAL at 12:51

## 2023-08-02 NOTE — OP NOTE
Cervical Epidural Steroid Injection   [facility]    PREOPERATIVE DIAGNOSIS:  Cervical Radiculopathy and Cervical Degenerative Disc Disease  POSTOPERATIVE DIAGNOSIS:  Same as preop diagnosis    PROCEDURE:   Cervical Epidural Steroid Injection, Therapeutic Interlaminar Injection, with epidurogram, at  C7-T1    PRE-PROCEDURE DISCUSSION WITH PATIENT:    Risks and complications were discussed with the patient prior to starting the procedure and informed consent was obtained.  We discussed various topics including but not limited to bleeding, infection, injury, paralysis, nerve injury, dural puncture, coma, death, worsening of clinical picture, lack of pain relief, and postprocedural soreness.    SURGEON:  Rosie Cuevas MD    REASON FOR PROCEDURE:   Diagnostic injection at this level is needed    SEDATION:  Anxiolysis was used for this procedure which included PO Valium 5mg  ANESTHETIC:  None  STEROID:   10mg Dexamethasone    DESCRIPTON OF PROCEDURE:  After obtaining informed consent, the patient was taken to the operating room and placed in the prone position.  EKG, blood pressure, and pulse oximeter were monitored throughout, and sedation was provided as needed by the RN under my guidance. All pressure points were well padded.  The cervicothoracic spine area was prepped with Chloraprep and draped in a sterile fashion.      AP fluoroscopic image was used to visualize the C7-T1 interspace.  The skin and subcutaneous tissue over the area was anesthetized with 1% Lidocaine.  An 18-Gauge Tuohy needle was then advanced through the anesthetized skin tract under fluoroscopic guidance in a coaxial view using a loss of resistance technique.  Lateral fluoroscopy was used to verify appropriate needle depth.  Once the needle tip was felt to be in the posterior epidural space, aspiration was noted to be negative for blood or CSF.  A volume of 1mL of Isovue 61% was then injected under live fluoroscopy in an AP  view which produced good epidural spread with no evidence of loculation, vascular run-off or intrathecal spread.  Subsequently, a total volume of 3mL consisting of 10mg of dexamethasone mixed with normal saline was injected without resistance.      ESTIMATED BLOOD LOSS:  <5 mL  SPECIMENS:  None    COMPLICATIONS:     No complications were noted., There was no indication of vascular uptake on live injection of contrast dye., and There was no indication of intrathecal uptake on live injection of contrast dye.    TOLERANCE & DISCHARGE CONDITION:    The patient tolerated the procedure well.  The patient was transported to the recovery area without difficulties.  The patient was discharged to home under the care of family in stable and satisfactory condition.    PLAN OF CARE:  The patient was given our standard instruction sheet.        2.   The patient will Return to clinic 4-6 wks.  3.    The patient will resume all medications as per the medication reconciliation sheet.

## 2023-08-04 ENCOUNTER — TELEPHONE (OUTPATIENT)
Dept: CARDIOLOGY | Facility: CLINIC | Age: 63
End: 2023-08-04
Payer: MEDICARE

## 2023-08-04 DIAGNOSIS — R42 POSTURAL DIZZINESS WITH PRESYNCOPE: Primary | ICD-10-CM

## 2023-08-04 DIAGNOSIS — R55 POSTURAL DIZZINESS WITH PRESYNCOPE: Primary | ICD-10-CM

## 2023-08-04 NOTE — TELEPHONE ENCOUNTER
I placed an order for a 24-hour Holter, per my note.  I can follow-up on the results and if there is any abnormalities that require further evaluation, we can set up a clinic appointment with her.  Otherwise we will let her know of a normal monitor and no further cardiac testing is needed.

## 2023-08-04 NOTE — TELEPHONE ENCOUNTER
"  Caller: Gabi Tomas    Relationship: Self    Best call back number: [phone]    What is the best time to reach you: ANYTIME    Who are you requesting to speak with (clinical staff, provider,  specific staff member): CLINICAL        What was the call regarding: DR HANNA SAW PT WHILE SHE WAS IN HOSPITAL AND TOLD HER HE WANTED HER TO WEAR HOLTER MONITOR FOR 4 DAYS.  THE HOSPITAL DID NOT PUT IT ON HER.  DO YOU NEED TO ORDER HOLTER MONITOR FOR HER?    Is it okay if the provider responds through MyChart: NO      ."   "

## 2023-08-07 NOTE — TELEPHONE ENCOUNTER
VM left for pt letting her know the order for the monitor has been placed and the scheduling dept will call her to get placement scheduled.

## 2023-08-10 ENCOUNTER — OFFICE VISIT (OUTPATIENT)
Dept: BEHAVIORAL HEALTH | Facility: CLINIC | Age: 63
End: 2023-08-10
Payer: MEDICARE

## 2023-08-10 DIAGNOSIS — F32.A CHRONIC DEPRESSION: Primary | ICD-10-CM

## 2023-08-10 NOTE — PROGRESS NOTES
COUNSELING NOTE    PATIENT NAME:         Gabi Tomas            DATE OF SERVICE:   08/10/2023   REFERRING PROVIDER: INDER Rothman           PROVIDER:                 Jonas Estrella EdD.   DIAGNOSIS FOR VISIT: Chronic depression  TIME OF SERVICE: 1 Hour    Chief Complaint: Depression, lethargy, little motivation, and difficulty coping with chronic pain in her bilateral hands and feet.    The patient was in as scheduled.  Since our last visit 2 weeks ago, Gabi chose to not spend so much of her time in bed and reported on several activities that she engaged in, including the performance of domestic chores, getting out of the house to go shopping with her , and going to a museum with her son and grandchildren.  Mrs. Tomas also described feelings of satisfaction and mary that she experienced as part of these activities, and stated that her feelings of depression had occurred less often and intensely.  Moreover, the patient made no mention of her bilateral extremity pain and how this may have impacted her activities.  This woman attributed the changes in her behavior to a feeling of hope that she took from our initial session.    Mental Status Exam: The patient was alert, oriented in all spheres, and in phase with the interview.  General appearance was adequate with regard to dress, hygiene, and grooming, and was improved since our initial visit.  No anomalies were noted in speech, but motor activity was marked by a tremor in her arms.  Flow of thought was normal, while thought content included themes of guilt and self-recrimination.  Mood appeared depressed, with appropriate affect.  General intellectual ability appeared to be average.  Behavior was pleasant and cooperative.    Treatment Plan: The focus of therapy today was on helping the patient to operationalize her hopeful vision of the future despite chronic pain.  She began by saying that she hoped the medical profession would give her  "answers about \"what was causing all of her problems,\" and she lamented that the scheduled appointment with a movement specialist was canceled.  With some encouragement Gabi was able to focus attention on what she would be doing, thinking, and feeling in her hope for the future.  Much of this had to do with getting out of bed, being more physically and socially active, and feeling a greater sense of satisfaction and mary in her life.  We discussed specific coping strategies that could help her achieve her goals, such as activity pacing, task persistence, and replacing her negative inner dialogue positive self-statements.  The patient was very responsive and the plan is for her to return for follow-up in 2 weeks.              Jonas Estrella EdD.  Clinical Health Psychologist      Dictated utilizing Dragon dictation.  "

## 2023-08-24 ENCOUNTER — OFFICE VISIT (OUTPATIENT)
Dept: BEHAVIORAL HEALTH | Facility: CLINIC | Age: 63
End: 2023-08-24
Payer: MEDICARE

## 2023-08-24 DIAGNOSIS — F33.1 MAJOR DEPRESSIVE DISORDER, RECURRENT EPISODE, MODERATE WITH ANXIOUS DISTRESS: Primary | ICD-10-CM

## 2023-08-30 ENCOUNTER — OFFICE VISIT (OUTPATIENT)
Dept: PAIN MEDICINE | Facility: CLINIC | Age: 63
End: 2023-08-30
Payer: MEDICARE

## 2023-08-30 VITALS
TEMPERATURE: 97.3 F | HEIGHT: 63 IN | DIASTOLIC BLOOD PRESSURE: 88 MMHG | OXYGEN SATURATION: 95 % | BODY MASS INDEX: 36.07 KG/M2 | SYSTOLIC BLOOD PRESSURE: 120 MMHG | HEART RATE: 80 BPM | WEIGHT: 203.6 LBS

## 2023-08-30 DIAGNOSIS — M50.30 DDD (DEGENERATIVE DISC DISEASE), CERVICAL: ICD-10-CM

## 2023-08-30 DIAGNOSIS — M47.816 LUMBAR SPONDYLOSIS: ICD-10-CM

## 2023-08-30 DIAGNOSIS — M54.12 CERVICAL RADICULOPATHY: Primary | ICD-10-CM

## 2023-08-30 DIAGNOSIS — G89.4 CHRONIC PAIN SYNDROME: ICD-10-CM

## 2023-08-30 PROCEDURE — 1160F RVW MEDS BY RX/DR IN RCRD: CPT | Performed by: PHYSICIAN ASSISTANT

## 2023-08-30 PROCEDURE — 1159F MED LIST DOCD IN RCRD: CPT | Performed by: PHYSICIAN ASSISTANT

## 2023-08-30 PROCEDURE — 1125F AMNT PAIN NOTED PAIN PRSNT: CPT | Performed by: PHYSICIAN ASSISTANT

## 2023-08-30 PROCEDURE — 99214 OFFICE O/P EST MOD 30 MIN: CPT | Performed by: PHYSICIAN ASSISTANT

## 2023-08-30 NOTE — PROGRESS NOTES
CHIEF COMPLAINT    Follow up neck pain.     Subjective   Gabi Tomas is a 63 y.o. female  who presents to the office for follow-up of procedure.  She completed a CERVICAL EPIDURAL STEROID INJECTION C7-T1 #1  on  8/2/23 performed by Dr. Cuevas for management of neck pain. Patient reports ongoing 90% relief from the procedure.  This patient is pleased to report significant improvement of posterior cervical spine pain which radiates into the left shoulder and upper extremity terminating within the hand with concomitant numbness and tingling.  Continues with tremors particular within the LUE which has been evaluated by movement disorder specialist.  Patient also has secondary complaints of of axial low back pain with greater than 50% reduction of pain which is ongoing following radiofrequency ablation.  She states that occasionally she will have some increased twinges of pain with certain activities but is doing well overall.    Patient was recently hospitalized on 7/24/2023 for fatigue and episodes of lightheadedness as well as mild confusion which occurred slightly after her and Cymbalta was increased to 90 mg twice daily.  During the hospital stay she was decreased back to 60 mg twice a day which she has tolerated well.    Pain today 1/10 VAS in severity.    Back Pain  This is a chronic problem. The current episode started more than 1 year ago. The problem occurs constantly. The problem is unchanged. The pain is present in the lumbar spine. The quality of the pain is described as aching. The pain does not radiate. The pain is at a severity of 1/10. The pain is mild. The pain is Worse during the day. The symptoms are aggravated by position, standing, twisting and bending. Associated symptoms include abdominal pain (mild), numbness (bilateral foot - toes, bilateral hand - fingers), tingling and weakness (generalized). Pertinent negatives include no dysuria or fever.   Neck Pain   This is a chronic problem. The  current episode started more than 1 year ago. The problem occurs constantly. The problem has been unchanged. The pain is associated with nothing. The pain is present in the left side. The quality of the pain is described as burning and shooting. The pain is at a severity of 1/10. The pain is mild. The symptoms are aggravated by position. The pain is Worse during the night. Associated symptoms include numbness (bilateral foot - toes, bilateral hand - fingers), tingling and weakness (generalized). Pertinent negatives include no fever.      PEG Assessment   What number best describes your pain on average in the past week?3  What number best describes how, during the past week, pain has interfered with your enjoyment of life?2  What number best describes how, during the past week, pain has interfered with your general activity?  2    Review of Pertinent Medical Data ---  MRI of the cervical spine without contrast.     COMPARISON:    None available.     FINDINGS:   Straightening of the cervical lordosis. Cervical bodies have normal height. No paravertebral mass.     C2-3: No central canal or foraminal narrowing.     C3-4: No central canal or foraminal narrowing.     C4-5: No central canal or foraminal narrowing.     C5-6: Circumferential disc osteophyte. Mild central canal narrowing. Right foraminal disc osteophyte with moderate right foraminal narrowing.     C6/7: Circumferential disc osteophyte. Mild central canal narrowing. Left foraminal disc osteophyte with moderately severe foraminal narrowing.     C7-T1: No central canal or foraminal narrowing.     IMPRESSION:   Straightening of the cervical lordosis.     Degenerative changes at C5-6 and C6-7 detailed above.     Dictated by: Zack Jackson MD Signed by Zack Jackson MD on 5/19/2023 13:57       PROCEDURE:  XR SPINE LUMBAR 2 OR 3 VW-, XR SPINE CERVICAL 2 OR 3 VW-     HISTORY: Chronic neck and back pain.     COMPARISON: CT abdomen and pelvis 4/26/2019     FINDINGS:        3 views of the cervical spine were obtained. The cervical spine is  visualized from C2 through C6 on the lateral view. There is  straightening of the normal cervical lordosis. There is trace  retrolisthesis of C5 on C6. Vertebral body heights are maintained. There  is moderate disc height loss at C5-6, where there are tiny degenerative  endplate osteophytes. There is mild uncovertebral hypertrophy.     3 views of the lumbar spine were obtained. There is a normal lumbar  lordosis. There is trace retrolisthesis of L2 on L3 and L3 on L4.  Vertebral body heights are maintained. There is at least moderate disc  height loss at L2-3 with lesser degrees of disc height loss at  additional levels. There are small degenerative endplate osteophytes at  L2-3. There is multilevel facet osteoarthropathy. There are surgical  clips in the right upper quadrant.     This report was finalized on 10/12/2022 9:54 AM by Dr. Aye Alvarez M.D.      The following portions of the patient's history were reviewed and updated as appropriate: allergies, current medications, past family history, past medical history, past social history, past surgical history, and problem list.    Review of Systems   Constitutional:  Positive for chills and fatigue. Negative for fever.   Respiratory:  Positive for cough. Negative for shortness of breath.    Gastrointestinal:  Positive for abdominal pain (mild) and constipation. Negative for diarrhea.   Genitourinary:  Negative for difficulty urinating and dysuria.   Musculoskeletal:  Positive for back pain, gait problem (ambulating with a walker) and neck pain.   Neurological:  Positive for dizziness, tingling, weakness (generalized), light-headedness and numbness (bilateral foot - toes, bilateral hand - fingers).   Psychiatric/Behavioral:  Positive for sleep disturbance.    I have reviewed and confirmed the accuracy of the ROS as documented by the MA/LPN/RN INDER Santoyo   Vitals:    08/30/23 1334   BP:  "120/88   BP Location: Left arm   Patient Position: Sitting   Pulse: 80   Temp: 97.3 øF (36.3 øC)   TempSrc: Temporal   SpO2: 95%   Weight: 92.4 kg (203 lb 9.6 oz)   Height: 160 cm (63\")   PainSc:   1   PainLoc: Neck         Objective   Physical Exam  Vitals and nursing note reviewed. Exam conducted with a chaperone present ().   Constitutional:       Appearance: Normal appearance.   HENT:      Head: Normocephalic.   Pulmonary:      Effort: Pulmonary effort is normal.   Musculoskeletal:      Cervical back: Tenderness present. Pain with movement, spinous process tenderness and muscular tenderness present. Decreased range of motion.      Lumbar back: Tenderness present. Decreased range of motion.   Skin:     General: Skin is warm and dry.   Neurological:      General: No focal deficit present.      Mental Status: She is alert and oriented to person, place, and time.      Cranial Nerves: Cranial nerves 2-12 are intact.      Sensory: Sensation is intact.      Motor: Tremor (LUE) present.      Gait: Gait abnormal (Ambulates with use of a rollator).   Psychiatric:         Mood and Affect: Mood normal.         Behavior: Behavior normal.         Thought Content: Thought content normal.         Judgment: Judgment normal.           Assessment & Plan   Diagnoses and all orders for this visit:    1. Cervical radiculopathy (Primary)    2. DDD (degenerative disc disease), cervical    3. Lumbar spondylosis    4. Chronic pain syndrome        Gabi Tomas reports a pain score of 1.  Given her pain assessment as noted, treatment options were discussed and the following options were decided upon as a follow-up plan to address the patient's pain: continuation of current treatment plan for pain, patient not interested in pharmacological measures, and use of non-medical modalities (ice, heat, stretching and/or behavior modifications).      --- Follow-up in 6 weeks or sooner as needed for further evaluation and treat " recommendations to be made.                    Dictated utilizing Dragon dictation.

## 2023-09-05 ENCOUNTER — HOSPITAL ENCOUNTER (OUTPATIENT)
Dept: MAMMOGRAPHY | Facility: HOSPITAL | Age: 63
Discharge: HOME | End: 2023-09-05
Admitting: OBSTETRICS & GYNECOLOGY
Payer: MEDICARE

## 2023-09-05 DIAGNOSIS — Z12.31 BREAST CANCER SCREENING BY MAMMOGRAM: ICD-10-CM

## 2023-09-05 PROBLEM — F33.1 MAJOR DEPRESSIVE DISORDER, RECURRENT EPISODE, MODERATE WITH ANXIOUS DISTRESS: Status: ACTIVE | Noted: 2023-09-05

## 2023-09-05 PROCEDURE — 77063 BREAST TOMOSYNTHESIS BI: CPT

## 2023-09-05 PROCEDURE — 77067 SCR MAMMO BI INCL CAD: CPT
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
## 2023-10-02 ENCOUNTER — PREP FOR SURGERY (OUTPATIENT)
Dept: SURGERY | Facility: SURGERY CENTER | Age: 63
End: 2023-10-02
Payer: MEDICARE

## 2023-10-02 ENCOUNTER — OFFICE VISIT (OUTPATIENT)
Dept: PAIN MEDICINE | Facility: CLINIC | Age: 63
End: 2023-10-02
Payer: MEDICARE

## 2023-10-02 VITALS
DIASTOLIC BLOOD PRESSURE: 80 MMHG | WEIGHT: 205.4 LBS | RESPIRATION RATE: 20 BRPM | OXYGEN SATURATION: 96 % | SYSTOLIC BLOOD PRESSURE: 102 MMHG | HEIGHT: 63 IN | TEMPERATURE: 96.6 F | HEART RATE: 81 BPM | BODY MASS INDEX: 36.39 KG/M2

## 2023-10-02 DIAGNOSIS — G89.4 CHRONIC PAIN SYNDROME: ICD-10-CM

## 2023-10-02 DIAGNOSIS — M47.816 LUMBAR SPONDYLOSIS: ICD-10-CM

## 2023-10-02 DIAGNOSIS — M50.30 DDD (DEGENERATIVE DISC DISEASE), CERVICAL: ICD-10-CM

## 2023-10-02 DIAGNOSIS — M46.1 SACROILIITIS: Primary | ICD-10-CM

## 2023-10-02 RX ORDER — DIAZEPAM 5 MG/1
5 TABLET ORAL ONCE
OUTPATIENT
Start: 2023-10-02 | End: 2023-10-02

## 2023-10-02 RX ORDER — KETOROLAC TROMETHAMINE 10 MG/1
10 TABLET, FILM COATED ORAL EVERY 6 HOURS PRN
Status: SHIPPED | OUTPATIENT
Start: 2023-10-02 | End: 2023-10-07

## 2023-10-02 RX ORDER — KETOROLAC TROMETHAMINE 30 MG/ML
30 INJECTION, SOLUTION INTRAMUSCULAR; INTRAVENOUS ONCE
Status: COMPLETED | OUTPATIENT
Start: 2023-10-02 | End: 2023-10-02

## 2023-10-02 RX ADMIN — KETOROLAC TROMETHAMINE 30 MG: 30 INJECTION, SOLUTION INTRAMUSCULAR; INTRAVENOUS at 15:26

## 2023-10-02 NOTE — PROGRESS NOTES
CHIEF COMPLAINT  Back pain  Groin pain    Subjective   Gabi Tomas is a 63 y.o. female  who presents for follow-up.  She has a history of neck and low back pain.  This patient is pleased to report greater than 90% ongoing pain relief of posterior cervical spine complaints following JUANITA which was completed on 8/2/2023.  The patient states that approximately 1 week ago she began experiencing acute onset of left-sided lumbosacral spine pain without precipitating fall or injury.  Patient reports left-sided lumbosacral spine pain which radiates into the left posterior buttock/groin and hip and extends into the lateral aspect of the left thigh terminating usually at the calf but occasionally will radiate into the arch of the foot.  She denies lower extremity numbness or tingling but does report subjective weakness.  Denies bladder or bowel incontinence.  Patient states that she has severe pain with prolonged sitting or lying on the left side.  She is unable to get comfortable at night due to the increased pain.    Patient reports pain today at 10/10 VAS in severity as it pertains to her back pain.      Back Pain  This is a new problem. The current episode started 1 to 4 weeks ago. The problem occurs constantly. The problem has been gradually worsening since onset. The pain is present in the sacro-iliac and lumbar spine. The quality of the pain is described as shooting, stabbing and burning. The pain radiates to the left thigh (LLE). The pain is at a severity of 10/10. The pain is severe. The pain is The same all the time. The symptoms are aggravated by sitting, position and lying down. Associated symptoms include abdominal pain, headaches, numbness and weakness. Pertinent negatives include no dysuria.   Groin Pain  Associated symptoms include abdominal pain, back pain, constipation, diarrhea and headaches. Pertinent negatives include no dysuria.      PEG Assessment   What number best describes your pain on average in  the past week?10  What number best describes how, during the past week, pain has interfered with your enjoyment of life?10  What number best describes how, during the past week, pain has interfered with your general activity?  10    Review of Pertinent Medical Data ---  ROCEDURE:  XR SPINE LUMBAR 2 OR 3 VW-, XR SPINE CERVICAL 2 OR 3 VW-     HISTORY: Chronic neck and back pain.     COMPARISON: CT abdomen and pelvis 4/26/2019     FINDINGS:       3 views of the cervical spine were obtained. The cervical spine is  visualized from C2 through C6 on the lateral view. There is  straightening of the normal cervical lordosis. There is trace  retrolisthesis of C5 on C6. Vertebral body heights are maintained. There  is moderate disc height loss at C5-6, where there are tiny degenerative  endplate osteophytes. There is mild uncovertebral hypertrophy.     3 views of the lumbar spine were obtained. There is a normal lumbar  lordosis. There is trace retrolisthesis of L2 on L3 and L3 on L4.  Vertebral body heights are maintained. There is at least moderate disc  height loss at L2-3 with lesser degrees of disc height loss at  additional levels. There are small degenerative endplate osteophytes at  L2-3. There is multilevel facet osteoarthropathy. There are surgical  clips in the right upper quadrant.     This report was finalized on 10/12/2022 9:54 AM by Dr. Aye Alvarez M.D.    The following portions of the patient's history were reviewed and updated as appropriate: allergies, current medications, past family history, past medical history, past social history, past surgical history, and problem list.    Review of Systems   Constitutional:  Positive for activity change. Negative for fatigue.   Gastrointestinal:  Positive for abdominal pain, constipation and diarrhea.   Genitourinary:  Negative for difficulty urinating and dysuria.   Musculoskeletal:  Positive for back pain.   Neurological:  Positive for dizziness, weakness,  "light-headedness, numbness and headaches.   Psychiatric/Behavioral:  Positive for sleep disturbance. Negative for agitation and suicidal ideas. The patient is nervous/anxious.    I have reviewed and confirmed the accuracy of the ROS as documented by the MA/LPN/RN INDER Santoyo  Vitals:    10/02/23 1447   BP: 102/80   BP Location: Left arm   Patient Position: Sitting   Cuff Size: Adult   Pulse: 81   Resp: 20   Temp: 96.6 °F (35.9 °C)   SpO2: 96%   Weight: 93.2 kg (205 lb 6.4 oz)   Height: 160 cm (63\")   PainSc: 10-Worst pain ever   PainLoc: Back         Objective   Physical Exam  Vitals and nursing note reviewed. Exam conducted with a chaperone present ().   Constitutional:       Appearance: Normal appearance.   HENT:      Head: Normocephalic.   Pulmonary:      Effort: Pulmonary effort is normal.   Musculoskeletal:      Lumbar back: Spasms and tenderness (EXQUISITE PAIN TO PALPATION OVER THE LEFT SI JOINT SPACE; +PATRICKS/+SI COMPRESSION/+SI THRUST TESTS ON LEFT) present. Decreased range of motion.        Back:    Skin:     General: Skin is warm and dry.   Neurological:      General: No focal deficit present.      Mental Status: She is alert and oriented to person, place, and time.      Cranial Nerves: Cranial nerves 2-12 are intact.      Sensory: Sensation is intact.      Motor: Motor function is intact.      Gait: Gait abnormal (AMBULATING WITH ROLLATOR).      Deep Tendon Reflexes:      Reflex Scores:       Patellar reflexes are 2+ on the right side and 2+ on the left side.       Achilles reflexes are 2+ on the right side and 2+ on the left side.  Psychiatric:         Mood and Affect: Mood normal.         Behavior: Behavior normal.         Thought Content: Thought content normal.         Judgment: Judgment normal.           Assessment & Plan   Diagnoses and all orders for this visit:    1. Sacroiliitis (Primary)  -     ketorolac (TORADOL) injection 30 mg  -     ketorolac (TORADOL) tablet 10 mg    2. " Lumbar spondylosis    3. DDD (degenerative disc disease), cervical    4. Chronic pain syndrome        Gabi Tomas reports a pain score of 10.  Given her pain assessment as noted, treatment options were discussed and the following options were decided upon as a follow-up plan to address the patient's pain: continuation of current treatment plan for pain, prescription for non-opiod analgesics, steroid injections, use of non-medical modalities (ice, heat, stretching and/or behavior modifications), and given an IM injection of Toradol in the office due to her extreme pain .      --- Based on pain which is reproducible with SI provoking maneuvers I will have the patient return for #1 diagnostic/therapeutic left sacroiliac joint injection.  The risk and benefits of the procedure been discussed with the patient and all of her questions addressed.  --- Due to her extreme pain here in the office today I have provided a ketorolac injection of 30 mg.  I will also send her home with a 5-day course of ketorolac.  The patient has been vies to avoid any NSAIDs whether prescribed or over-the-counter while taking the ketorolac.  She denies any history of GI ulcers/bleeding nor any issues with renal disease.  --- Based on her response to diagnostic injection I may proceed with lumbar MRI in the future for persistent left lower extremity radicular pain.      ----------  Education about Sacroiliac joint injections:  This Sacroiliac joint injection (blockade) we have suggested is intended for diagnostic purposes, with the intent of offering the patient Radiofrequency thermal rhizotomy (RF) of the sensory branches to the joint if the block is diagnostically effective.  The diagnostic blockade is necessary to determine the likelihood that RF therapy could be efficacious in providing long term relief to the patient.    In this procedure, the sacroiliac joint is aligned with imaging, and under image guidance a needle is placed with the  needle tip into the joint.  The needle position is confirmed to be appropriately in the joint before injection of medication into the joint.  When xray fluoroscopy is used, contrast dye is used to confirm a proper arthrogram (i.e., outline of the joint).  When ultrasound is used, IV fluid (normal saline) is injected to see the flow of the fluid into the joint.  Once confirmed, then the medication can be injected into the joint.  Oftentimes this medication is a combination of local anesthetics (for diagnostic purposes) and also a steroid (to decrease irritation & inflammation in the joint, also known as sacroilitis).      Medically, a successful RF procedure should provide a patient with 50% pain relief or more for at least 6 months.  Clinical experience suggests that successful patients receive relief more in the range of 12 months on average.  We also discussed that many patients receive therapeutic success from the intraarticular joint injection, and may not require RF ablation.  If a patient receives more than 8 weeks of relief from joint injection, then occasional repeat joint blocks for therapeutic purposes is a very reasonable alternative therapy.  This course of therapy is consistent with our LCDs according to our CMS  in the area, and therefore other insurance providers should follow accordingly.  We will monitor our patients to screen for these therapeutic responders and will offer RF therapy only when necessary.      We discussed that joint injections & also RF procedures are not without risks.  Best practices regarding anticoagulant use & neuraxial procedures will be respected.  Oftentimes a patient on an anticoagulant can be offered a joint injection safely, but again this is not risk-free, and such patients give consent with regards to this increased bleeding risk, which could cause problems including but not limited to worsening of pain, nerve damage, or muscle damage.  Patients that are  ill or otherwise may be at risk for sepsis will not have their spines accessed by neuraxial injections of any type.  This patient will not be offered these therapies if there is an increased risk.   We discussed that there is a risk of postprocedural pain and also a risk of worsening of clinical picture with these procedures as with any neuraxial procedure.    ----------         Dictated utilizing Dragon dictation.

## 2023-10-03 ENCOUNTER — TRANSCRIBE ORDERS (OUTPATIENT)
Dept: SURGERY | Facility: SURGERY CENTER | Age: 63
End: 2023-10-03
Payer: MEDICARE

## 2023-10-03 ENCOUNTER — PATIENT MESSAGE (OUTPATIENT)
Dept: PAIN MEDICINE | Facility: CLINIC | Age: 63
End: 2023-10-03
Payer: MEDICARE

## 2023-10-03 DIAGNOSIS — Z41.9 SURGERY, ELECTIVE: Primary | ICD-10-CM

## 2023-10-03 RX ORDER — KETOROLAC TROMETHAMINE 10 MG/1
TABLET, FILM COATED ORAL
Qty: 12 TABLET | Refills: 0 | Status: SHIPPED | OUTPATIENT
Start: 2023-10-03

## 2023-10-03 NOTE — TELEPHONE ENCOUNTER
Curly Feliciano Dr seems to forget to send the medication yesterday and she is not here today, can you please send it? Thank you

## 2023-10-23 ENCOUNTER — HOSPITAL ENCOUNTER (OUTPATIENT)
Dept: GENERAL RADIOLOGY | Facility: SURGERY CENTER | Age: 63
Setting detail: HOSPITAL OUTPATIENT SURGERY
End: 2023-10-23
Payer: MEDICARE

## 2023-10-23 ENCOUNTER — HOSPITAL ENCOUNTER (OUTPATIENT)
Facility: SURGERY CENTER | Age: 63
Setting detail: HOSPITAL OUTPATIENT SURGERY
Discharge: HOME | End: 2023-10-23
Attending: ANESTHESIOLOGY | Admitting: ANESTHESIOLOGY
Payer: MEDICARE

## 2023-10-23 VITALS
SYSTOLIC BLOOD PRESSURE: 125 MMHG | TEMPERATURE: 97.7 F | WEIGHT: 200 LBS | RESPIRATION RATE: 16 BRPM | HEART RATE: 69 BPM | HEIGHT: 63 IN | OXYGEN SATURATION: 97 % | DIASTOLIC BLOOD PRESSURE: 72 MMHG | BODY MASS INDEX: 35.44 KG/M2

## 2023-10-23 DIAGNOSIS — Z41.9 SURGERY, ELECTIVE: ICD-10-CM

## 2023-10-23 PROCEDURE — 27096 INJECT SACROILIAC JOINT: CPT | Performed by: ANESTHESIOLOGY

## 2023-10-23 PROCEDURE — 25510000001 IOPAMIDOL 61 % SOLUTION 30 ML VIAL: Performed by: ANESTHESIOLOGY

## 2023-10-23 PROCEDURE — 77002 NEEDLE LOCALIZATION BY XRAY: CPT

## 2023-10-23 PROCEDURE — 25010000002 DEXAMETHASONE SODIUM PHOSPHATE 100 MG/10ML SOLUTION 10 ML VIAL: Performed by: ANESTHESIOLOGY

## 2023-10-23 PROCEDURE — G0260 INJ FOR SACROILIAC JT ANESTH: HCPCS | Performed by: ANESTHESIOLOGY

## 2023-10-23 RX ORDER — DIAZEPAM 5 MG/1
5 TABLET ORAL ONCE
Status: COMPLETED | OUTPATIENT
Start: 2023-10-23 | End: 2023-10-23

## 2023-10-23 RX ADMIN — DIAZEPAM 5 MG: 5 TABLET ORAL at 13:24

## 2023-10-23 NOTE — OP NOTE
Left Sacroiliac Joint Injection  [facility]      PREOPERATIVE DIAGNOSIS:   Sacroiliac joint dysfunction    POSTOPERATIVE DIAGNOSIS:  Same    PROCEDURE:  Sacroiliac Joint Injection, with fluoroscopic guidance CPT 13121 -LT    PRE-PROCEDURE DISCUSSION WITH PATIENT:    Risks and complications were discussed with the patient prior to starting the procedure and informed consent was obtained.  We discussed various topics including but not limited to bleeding, infection, injury, postprocedural site soreness, painful flareup, worsening of clinical picture, paralysis, coma, and death.     SURGEON:  Rosie Cuevas MD    REASON FOR PROCEDURE:    Patient has pain consistent with SI pathology on history and physical exam.    SEDATION:  Anxiolysis was used for this procedure which included PO Valium 5mg  ANESTHETIC AGENT:  1% Lidocaine  STEROID AGENT:  10mg Dexamethasone    DESCRIPTON OF PROCEDURE:  After obtaining informed consent, IV access was not obtained in the preoperative area.  The patient was transported to the operative suite and placed in the prone position. EKG, blood pressure, and pulse oximeter were monitored. The lumbosacral area was prepped with Chloraprep and draped in a sterile fashion. Under fluoroscopic guidance the inferior most portion of the left SI joint was identified. The overlying skin and subcutaneous tissue was anesthetized with 1% lidocaine. A 22-gauge spinal needle was then advanced under fluoroscopic guidance in a coaxial view into the joint space.  After negative aspiration, 1 mL of Isovue 61% was injected, and after seeing appropriate spread into the joint a volume of 3ml of the above medication was injected.    Needle was removed intact.        Vital signs remained stable.  The onset of analgesia was noted.    Pre-procedure pain score: 6/10 at rest, 10/10 with activity  Post-procedure pain score: 2/10      ESTIMATED BLOOD LOSS:  minimal  SPECIMENS:  None  COMPLICATIONS:  No  complications were noted. and There was no indication of vascular uptake on live injection of contrast dye.    TOLERANCE & DISCHARGE CONDITION:    The patient tolerated the procedure well.  The patient was transported to the recovery area without difficulties.  The patient was discharged to home under the care of family in stable and satisfactory condition.    PLAN OF CARE:  The patient was given our standard instruction sheet and will resume all medications as per the medication reconciliation sheet.  The patient will Return to clinic 4-6 wks.  The patient is instructed to keep an account of pain relief after the procedure.

## 2023-10-23 NOTE — DISCHARGE INSTRUCTIONS
[facility] - Post-procedure Instructions          --  While there are no absolute restrictions, it is recommended that you do not perform strenuous activity today. In the morning, you may resume your level of activity as before your block.    --  If you have a band-aid at your injection site, please remove it later today. Observe the area for any redness, swelling, pus-like drainage, or a temperature over 101°. If any of these symptoms occur, please call your doctor at [phone]. If after office hours, leave a message and the on-call provider will return your call.    --  Ice may be applied to your injection site. It is recommended you avoid direct heat (heating pad; hot tub) for 1-2 days.    --  Call [facility] at [phone] if you experience persistent headache, persistent bleeding from the injection site, or severe pain not relieved by heat or oral medication.    --  Do not make important decisions today.    --  Due to the effects of the block and/or the I.V. Sedation, DO NOT drive or operate hazardous machinery for 12 hours.  Local anesthetics may cause numbness after procedure and precautions must be taken with regards to operating equipment as well as with walking, even if ambulating with assistance of another person or with an assistive device.    --  Do not drink alcohol for 12 hours.    -- You may return to work tomorrow, or as directed by your referring doctor.    --  Occasionally you may notice a slight increase in your pain after the procedure. This should start to improve within the next 24-48 hours. Radiofrequency ablation procedure pain may last 3-4 weeks.    --  It may take as long as 3-4 days before you notice a gradual improvement in your pain and/or other symptoms.    -- You may continue to take your prescribed pain medication as needed.    --  Some normal possible side effects of steroid use could include fluid retention, increased blood sugar, dull headache,  increased sweating, increased appetite, mood swings and flushing.    --  Diabetics are recommended to watch their blood glucose level closely for 24-48 hours after the injection.    --  Must stay in PACU for 20 min upon arrival and prove no leg weakness before being discharged.    --  IN THE EVENT OF A LIFE THREATENING EMERGENCY, (CHEST PAIN, BREATHING DIFFICULTIES, PARALYSIS…) YOU SHOULD GO TO YOUR NEAREST EMERGENCY ROOM.    --  You should be contacted by our office within 2-3 days to schedule follow up or next appointment date.  If not contacted within 7 days, please call the office at [phone]

## 2023-10-24 ENCOUNTER — HOSPITAL ENCOUNTER (OUTPATIENT)
Facility: HOSPITAL | Age: 63
Setting detail: OBSERVATION
Discharge: HOME | End: 2023-10-25
Attending: EMERGENCY MEDICINE | Admitting: EMERGENCY MEDICINE
Payer: MEDICARE

## 2023-10-24 ENCOUNTER — APPOINTMENT (OUTPATIENT)
Dept: GENERAL RADIOLOGY | Facility: HOSPITAL | Age: 63
End: 2023-10-24
Payer: MEDICARE

## 2023-10-24 ENCOUNTER — DOCUMENTATION (OUTPATIENT)
Dept: PAIN MEDICINE | Facility: CLINIC | Age: 63
End: 2023-10-24
Payer: MEDICARE

## 2023-10-24 DIAGNOSIS — R03.0 ELEVATED BLOOD PRESSURE READING: ICD-10-CM

## 2023-10-24 DIAGNOSIS — R51.9 NONINTRACTABLE HEADACHE, UNSPECIFIED CHRONICITY PATTERN, UNSPECIFIED HEADACHE TYPE: ICD-10-CM

## 2023-10-24 DIAGNOSIS — R07.9 CHEST PAIN, UNSPECIFIED TYPE: Primary | ICD-10-CM

## 2023-10-24 LAB
ALBUMIN SERPL-MCNC: 4.6 G/DL (ref 3.5–5.2)
ALBUMIN/GLOB SERPL: 2.6 G/DL
ALP SERPL-CCNC: 93 U/L (ref 39–117)
ALT SERPL W P-5'-P-CCNC: 21 U/L (ref 1–33)
ANION GAP SERPL CALCULATED.3IONS-SCNC: 12.2 MMOL/L (ref 5–15)
APTT PPP: 24.1 SECONDS (ref 22.7–35.4)
AST SERPL-CCNC: 21 U/L (ref 1–32)
BASOPHILS # BLD AUTO: 0.03 10*3/MM3 (ref 0–0.2)
BASOPHILS NFR BLD AUTO: 0.2 % (ref 0–1.5)
BILIRUB SERPL-MCNC: 0.3 MG/DL (ref 0–1.2)
BUN SERPL-MCNC: 9 MG/DL (ref 8–23)
BUN/CREAT SERPL: 12.5 (ref 7–25)
CALCIUM SPEC-SCNC: 9.5 MG/DL (ref 8.6–10.5)
CHLORIDE SERPL-SCNC: 107 MMOL/L (ref 98–107)
CO2 SERPL-SCNC: 23.8 MMOL/L (ref 22–29)
CREAT SERPL-MCNC: 0.72 MG/DL (ref 0.57–1)
D DIMER PPP FEU-MCNC: 0.34 MCGFEU/ML (ref 0–0.61)
DEPRECATED RDW RBC AUTO: 44.4 FL (ref 37–54)
EGFRCR SERPLBLD CKD-EPI 2021: 95.3 ML/MIN/1.73
EOSINOPHIL # BLD AUTO: 0 10*3/MM3 (ref 0–0.4)
EOSINOPHIL NFR BLD AUTO: 0 % (ref 0.3–6.2)
ERYTHROCYTE [DISTWIDTH] IN BLOOD BY AUTOMATED COUNT: 13.3 % (ref 12.3–15.4)
GLOBULIN UR ELPH-MCNC: 1.8 GM/DL
GLUCOSE SERPL-MCNC: 104 MG/DL (ref 65–99)
HCT VFR BLD AUTO: 46.3 % (ref 34–46.6)
HGB BLD-MCNC: 15.3 G/DL (ref 12–15.9)
IMM GRANULOCYTES # BLD AUTO: 0.07 10*3/MM3 (ref 0–0.05)
IMM GRANULOCYTES NFR BLD AUTO: 0.4 % (ref 0–0.5)
INR PPP: 0.89 (ref 0.9–1.1)
LYMPHOCYTES # BLD AUTO: 2.08 10*3/MM3 (ref 0.7–3.1)
LYMPHOCYTES NFR BLD AUTO: 12.9 % (ref 19.6–45.3)
MCH RBC QN AUTO: 29.9 PG (ref 26.6–33)
MCHC RBC AUTO-ENTMCNC: 33 G/DL (ref 31.5–35.7)
MCV RBC AUTO: 90.4 FL (ref 79–97)
MONOCYTES # BLD AUTO: 0.7 10*3/MM3 (ref 0.1–0.9)
MONOCYTES NFR BLD AUTO: 4.3 % (ref 5–12)
NEUTROPHILS NFR BLD AUTO: 13.27 10*3/MM3 (ref 1.7–7)
NEUTROPHILS NFR BLD AUTO: 82.2 % (ref 42.7–76)
NRBC BLD AUTO-RTO: 0 /100 WBC (ref 0–0.2)
PLATELET # BLD AUTO: 269 10*3/MM3 (ref 140–450)
PMV BLD AUTO: 10.8 FL (ref 6–12)
POTASSIUM SERPL-SCNC: 3.9 MMOL/L (ref 3.5–5.2)
PROT SERPL-MCNC: 6.4 G/DL (ref 6–8.5)
PROTHROMBIN TIME: 12.1 SECONDS (ref 11.7–14.2)
RBC # BLD AUTO: 5.12 10*6/MM3 (ref 3.77–5.28)
SODIUM SERPL-SCNC: 143 MMOL/L (ref 136–145)
TROPONIN T SERPL HS-MCNC: 10 NG/L
WBC NRBC COR # BLD: 16.15 10*3/MM3 (ref 3.4–10.8)

## 2023-10-24 PROCEDURE — 93005 ELECTROCARDIOGRAM TRACING: CPT

## 2023-10-24 PROCEDURE — 96375 TX/PRO/DX INJ NEW DRUG ADDON: CPT

## 2023-10-24 PROCEDURE — 85610 PROTHROMBIN TIME: CPT | Performed by: EMERGENCY MEDICINE

## 2023-10-24 PROCEDURE — 93010 ELECTROCARDIOGRAM REPORT: CPT | Performed by: INTERNAL MEDICINE

## 2023-10-24 PROCEDURE — 25810000003 SODIUM CHLORIDE 0.9 % SOLUTION: Performed by: EMERGENCY MEDICINE

## 2023-10-24 PROCEDURE — 25010000002 MORPHINE PER 10 MG: Performed by: EMERGENCY MEDICINE

## 2023-10-24 PROCEDURE — 99284 EMERGENCY DEPT VISIT MOD MDM: CPT

## 2023-10-24 PROCEDURE — 85730 THROMBOPLASTIN TIME PARTIAL: CPT | Performed by: EMERGENCY MEDICINE

## 2023-10-24 PROCEDURE — 85025 COMPLETE CBC W/AUTO DIFF WBC: CPT | Performed by: EMERGENCY MEDICINE

## 2023-10-24 PROCEDURE — 80053 COMPREHEN METABOLIC PANEL: CPT | Performed by: EMERGENCY MEDICINE

## 2023-10-24 PROCEDURE — 84484 ASSAY OF TROPONIN QUANT: CPT | Performed by: EMERGENCY MEDICINE

## 2023-10-24 PROCEDURE — 96374 THER/PROPH/DIAG INJ IV PUSH: CPT

## 2023-10-24 PROCEDURE — 25010000002 ONDANSETRON PER 1 MG: Performed by: EMERGENCY MEDICINE

## 2023-10-24 PROCEDURE — 93005 ELECTROCARDIOGRAM TRACING: CPT | Performed by: EMERGENCY MEDICINE

## 2023-10-24 PROCEDURE — 71045 X-RAY EXAM CHEST 1 VIEW: CPT

## 2023-10-24 PROCEDURE — 85379 FIBRIN DEGRADATION QUANT: CPT | Performed by: EMERGENCY MEDICINE

## 2023-10-24 RX ORDER — ASPIRIN 81 MG/1
324 TABLET, CHEWABLE ORAL ONCE
Status: COMPLETED | OUTPATIENT
Start: 2023-10-24 | End: 2023-10-24

## 2023-10-24 RX ORDER — SODIUM CHLORIDE 0.9 % (FLUSH) 0.9 %
10 SYRINGE (ML) INJECTION AS NEEDED
Status: DISCONTINUED | OUTPATIENT
Start: 2023-10-24 | End: 2023-10-25 | Stop reason: HOSPADM

## 2023-10-24 RX ORDER — NITROGLYCERIN 0.4 MG/1
0.4 TABLET SUBLINGUAL
Status: DISCONTINUED | OUTPATIENT
Start: 2023-10-24 | End: 2023-10-25 | Stop reason: HOSPADM

## 2023-10-24 RX ORDER — MORPHINE SULFATE 2 MG/ML
4 INJECTION, SOLUTION INTRAMUSCULAR; INTRAVENOUS ONCE
Status: COMPLETED | OUTPATIENT
Start: 2023-10-24 | End: 2023-10-24

## 2023-10-24 RX ORDER — AMOXICILLIN 250 MG
2 CAPSULE ORAL 2 TIMES DAILY
Status: DISCONTINUED | OUTPATIENT
Start: 2023-10-24 | End: 2023-10-25 | Stop reason: HOSPADM

## 2023-10-24 RX ORDER — BISACODYL 10 MG
10 SUPPOSITORY, RECTAL RECTAL DAILY PRN
Status: DISCONTINUED | OUTPATIENT
Start: 2023-10-24 | End: 2023-10-25 | Stop reason: HOSPADM

## 2023-10-24 RX ORDER — ONDANSETRON 2 MG/ML
4 INJECTION INTRAMUSCULAR; INTRAVENOUS ONCE
Status: COMPLETED | OUTPATIENT
Start: 2023-10-24 | End: 2023-10-24

## 2023-10-24 RX ORDER — BISACODYL 5 MG/1
5 TABLET, DELAYED RELEASE ORAL DAILY PRN
Status: DISCONTINUED | OUTPATIENT
Start: 2023-10-24 | End: 2023-10-25 | Stop reason: HOSPADM

## 2023-10-24 RX ORDER — POLYETHYLENE GLYCOL 3350 17 G/17G
17 POWDER, FOR SOLUTION ORAL DAILY PRN
Status: DISCONTINUED | OUTPATIENT
Start: 2023-10-24 | End: 2023-10-25 | Stop reason: HOSPADM

## 2023-10-24 RX ADMIN — SODIUM CHLORIDE 500 ML: 9 INJECTION, SOLUTION INTRAVENOUS at 21:07

## 2023-10-24 RX ADMIN — ONDANSETRON 4 MG: 2 INJECTION INTRAMUSCULAR; INTRAVENOUS at 21:12

## 2023-10-24 RX ADMIN — MORPHINE SULFATE 4 MG: 2 INJECTION, SOLUTION INTRAMUSCULAR; INTRAVENOUS at 21:09

## 2023-10-24 RX ADMIN — ASPIRIN 324 MG: 81 TABLET, CHEWABLE ORAL at 22:51

## 2023-10-24 RX ADMIN — NITROGLYCERIN 1 INCH: 20 OINTMENT TOPICAL at 22:53

## 2023-10-24 NOTE — PROGRESS NOTES
Ms. Tomas contacted the call service on 10/24/2023 d/t headache and elevated blood pressure. She states that for 4 days prior to her SI joint injection on 10/23/2023 she had a moderate migraine. She states that after her injection her headache pain increased and she has had a severe migraine today.  She also reports that her blood pressure is 178/125 this evening. She denies any history of hypertension and states that her blood pressure typically runs low. She denies any chest pain, but did mention that she had chest pain following her injection and did not mention this to the staff.     I reviewed that hypertension is a possible side effect of steroids. I recommended that she proceed to the ED for evaluation d/t her severe headache in the setting of hypertension. She planned to proceed to [facility] ED for evaluation.

## 2023-10-25 ENCOUNTER — APPOINTMENT (OUTPATIENT)
Dept: NUCLEAR MEDICINE | Facility: HOSPITAL | Age: 63
End: 2023-10-25
Payer: MEDICARE

## 2023-10-25 ENCOUNTER — TELEPHONE (OUTPATIENT)
Dept: PAIN MEDICINE | Facility: CLINIC | Age: 63
End: 2023-10-25
Payer: MEDICARE

## 2023-10-25 VITALS
SYSTOLIC BLOOD PRESSURE: 116 MMHG | WEIGHT: 200 LBS | RESPIRATION RATE: 18 BRPM | TEMPERATURE: 97.7 F | BODY MASS INDEX: 35.44 KG/M2 | HEIGHT: 63 IN | OXYGEN SATURATION: 91 % | DIASTOLIC BLOOD PRESSURE: 64 MMHG | HEART RATE: 61 BPM

## 2023-10-25 LAB
ANION GAP SERPL CALCULATED.3IONS-SCNC: 9 MMOL/L (ref 5–15)
BH CV REST NUCLEAR ISOTOPE DOSE: 10 MCI
BH CV STRESS COMMENTS STAGE 1: NORMAL
BH CV STRESS DOSE REGADENOSON STAGE 1: 0.4
BH CV STRESS DURATION MIN STAGE 1: 0
BH CV STRESS DURATION SEC STAGE 1: 10
BH CV STRESS NUCLEAR ISOTOPE DOSE: 29.1 MCI
BH CV STRESS PROTOCOL 1: NORMAL
BH CV STRESS RECOVERY BP: NORMAL MMHG
BH CV STRESS RECOVERY HR: 61 BPM
BH CV STRESS STAGE 1: 1
BUN SERPL-MCNC: 10 MG/DL (ref 8–23)
BUN/CREAT SERPL: 15.4 (ref 7–25)
CALCIUM SPEC-SCNC: 8.6 MG/DL (ref 8.6–10.5)
CHLORIDE SERPL-SCNC: 112 MMOL/L (ref 98–107)
CHOLEST SERPL-MCNC: 278 MG/DL (ref 0–200)
CO2 SERPL-SCNC: 21 MMOL/L (ref 22–29)
CREAT SERPL-MCNC: 0.65 MG/DL (ref 0.57–1)
DEPRECATED RDW RBC AUTO: 42.8 FL (ref 37–54)
EGFRCR SERPLBLD CKD-EPI 2021: 100.3 ML/MIN/1.73
ERYTHROCYTE [DISTWIDTH] IN BLOOD BY AUTOMATED COUNT: 13 % (ref 12.3–15.4)
GEN 5 2HR TROPONIN T REFLEX: <6 NG/L
GLUCOSE SERPL-MCNC: 93 MG/DL (ref 65–99)
HCT VFR BLD AUTO: 41.3 % (ref 34–46.6)
HDLC SERPL-MCNC: 73 MG/DL (ref 40–60)
HGB BLD-MCNC: 13.9 G/DL (ref 12–15.9)
LDLC SERPL CALC-MCNC: 170 MG/DL (ref 0–100)
LDLC/HDLC SERPL: 2.28 {RATIO}
LV EF NUC BP: 77 %
MAGNESIUM SERPL-MCNC: 2.4 MG/DL (ref 1.6–2.4)
MAXIMAL PREDICTED HEART RATE: 159 BPM
MCH RBC QN AUTO: 30.7 PG (ref 26.6–33)
MCHC RBC AUTO-ENTMCNC: 33.7 G/DL (ref 31.5–35.7)
MCV RBC AUTO: 91.2 FL (ref 79–97)
PERCENT MAX PREDICTED HR: 45.28 %
PLATELET # BLD AUTO: 221 10*3/MM3 (ref 140–450)
PMV BLD AUTO: 11.2 FL (ref 6–12)
POTASSIUM SERPL-SCNC: 3.9 MMOL/L (ref 3.5–5.2)
QT INTERVAL: 370 MS
QT INTERVAL: 418 MS
QT INTERVAL: 438 MS
QTC INTERVAL: 411 MS
QTC INTERVAL: 419 MS
QTC INTERVAL: 428 MS
RBC # BLD AUTO: 4.53 10*6/MM3 (ref 3.77–5.28)
SODIUM SERPL-SCNC: 142 MMOL/L (ref 136–145)
STRESS BASELINE BP: NORMAL MMHG
STRESS BASELINE HR: 53 BPM
STRESS PERCENT HR: 53 %
STRESS POST PEAK BP: NORMAL MMHG
STRESS POST PEAK HR: 72 BPM
STRESS TARGET HR: 135 BPM
TRIGL SERPL-MCNC: 191 MG/DL (ref 0–150)
TROPONIN T DELTA: NORMAL
TROPONIN T SERPL HS-MCNC: 10 NG/L
VLDLC SERPL-MCNC: 35 MG/DL (ref 5–40)
WBC NRBC COR # BLD: 10.53 10*3/MM3 (ref 3.4–10.8)

## 2023-10-25 PROCEDURE — 25010000002 DIPHENHYDRAMINE PER 50 MG: Performed by: NURSE PRACTITIONER

## 2023-10-25 PROCEDURE — 84484 ASSAY OF TROPONIN QUANT: CPT | Performed by: NURSE PRACTITIONER

## 2023-10-25 PROCEDURE — 96375 TX/PRO/DX INJ NEW DRUG ADDON: CPT

## 2023-10-25 PROCEDURE — 83735 ASSAY OF MAGNESIUM: CPT | Performed by: NURSE PRACTITIONER

## 2023-10-25 PROCEDURE — 93010 ELECTROCARDIOGRAM REPORT: CPT | Performed by: INTERNAL MEDICINE

## 2023-10-25 PROCEDURE — 80048 BASIC METABOLIC PNL TOTAL CA: CPT | Performed by: NURSE PRACTITIONER

## 2023-10-25 PROCEDURE — 93005 ELECTROCARDIOGRAM TRACING: CPT | Performed by: NURSE PRACTITIONER

## 2023-10-25 PROCEDURE — 78452 HT MUSCLE IMAGE SPECT MULT: CPT | Performed by: INTERNAL MEDICINE

## 2023-10-25 PROCEDURE — 0 TECHNETIUM SESTAMIBI: Performed by: EMERGENCY MEDICINE

## 2023-10-25 PROCEDURE — 93017 CV STRESS TEST TRACING ONLY: CPT

## 2023-10-25 PROCEDURE — 93018 CV STRESS TEST I&R ONLY: CPT | Performed by: INTERNAL MEDICINE

## 2023-10-25 PROCEDURE — 78452 HT MUSCLE IMAGE SPECT MULT: CPT

## 2023-10-25 PROCEDURE — G0378 HOSPITAL OBSERVATION PER HR: HCPCS

## 2023-10-25 PROCEDURE — A9500 TC99M SESTAMIBI: HCPCS | Performed by: EMERGENCY MEDICINE

## 2023-10-25 PROCEDURE — 85027 COMPLETE CBC AUTOMATED: CPT | Performed by: NURSE PRACTITIONER

## 2023-10-25 PROCEDURE — 25010000002 PROCHLORPERAZINE 10 MG/2ML SOLUTION: Performed by: NURSE PRACTITIONER

## 2023-10-25 PROCEDURE — 25010000002 REGADENOSON 0.4 MG/5ML SOLUTION: Performed by: EMERGENCY MEDICINE

## 2023-10-25 PROCEDURE — 80061 LIPID PANEL: CPT | Performed by: NURSE PRACTITIONER

## 2023-10-25 PROCEDURE — 99214 OFFICE O/P EST MOD 30 MIN: CPT | Performed by: STUDENT IN AN ORGANIZED HEALTH CARE EDUCATION/TRAINING PROGRAM

## 2023-10-25 PROCEDURE — 93016 CV STRESS TEST SUPVJ ONLY: CPT | Performed by: INTERNAL MEDICINE

## 2023-10-25 PROCEDURE — 25810000003 SODIUM CHLORIDE 0.9 % SOLUTION: Performed by: NURSE PRACTITIONER

## 2023-10-25 RX ORDER — REGADENOSON 0.08 MG/ML
0.4 INJECTION, SOLUTION INTRAVENOUS
Status: COMPLETED | OUTPATIENT
Start: 2023-10-25 | End: 2023-10-25

## 2023-10-25 RX ORDER — SODIUM CHLORIDE 9 MG/ML
100 INJECTION, SOLUTION INTRAVENOUS CONTINUOUS
Status: DISCONTINUED | OUTPATIENT
Start: 2023-10-25 | End: 2023-10-25

## 2023-10-25 RX ORDER — DIPHENHYDRAMINE HYDROCHLORIDE 50 MG/ML
25 INJECTION INTRAMUSCULAR; INTRAVENOUS ONCE
Status: COMPLETED | OUTPATIENT
Start: 2023-10-25 | End: 2023-10-25

## 2023-10-25 RX ORDER — PROCHLORPERAZINE EDISYLATE 5 MG/ML
5 INJECTION INTRAMUSCULAR; INTRAVENOUS ONCE
Status: COMPLETED | OUTPATIENT
Start: 2023-10-25 | End: 2023-10-25

## 2023-10-25 RX ORDER — GABAPENTIN 400 MG/1
800 CAPSULE ORAL EVERY 12 HOURS SCHEDULED
Status: DISCONTINUED | OUTPATIENT
Start: 2023-10-25 | End: 2023-10-25 | Stop reason: HOSPADM

## 2023-10-25 RX ORDER — DULOXETIN HYDROCHLORIDE 60 MG/1
60 CAPSULE, DELAYED RELEASE ORAL 2 TIMES DAILY
Status: DISCONTINUED | OUTPATIENT
Start: 2023-10-25 | End: 2023-10-25 | Stop reason: HOSPADM

## 2023-10-25 RX ADMIN — PROCHLORPERAZINE EDISYLATE 5 MG: 5 INJECTION INTRAMUSCULAR; INTRAVENOUS at 01:02

## 2023-10-25 RX ADMIN — SODIUM CHLORIDE 100 ML/HR: 9 INJECTION, SOLUTION INTRAVENOUS at 01:32

## 2023-10-25 RX ADMIN — DIPHENHYDRAMINE HYDROCHLORIDE 25 MG: 50 INJECTION, SOLUTION INTRAMUSCULAR; INTRAVENOUS at 01:02

## 2023-10-25 RX ADMIN — DULOXETINE HYDROCHLORIDE 60 MG: 60 CAPSULE, DELAYED RELEASE ORAL at 08:29

## 2023-10-25 RX ADMIN — REGADENOSON 0.4 MG: 0.08 INJECTION, SOLUTION INTRAVENOUS at 11:25

## 2023-10-25 RX ADMIN — SENNOSIDES AND DOCUSATE SODIUM 2 TABLET: 50; 8.6 TABLET ORAL at 08:29

## 2023-10-25 RX ADMIN — TECHNETIUM TC 99M SESTAMIBI 1 DOSE: 1 INJECTION INTRAVENOUS at 10:04

## 2023-10-25 RX ADMIN — TECHNETIUM TC 99M SESTAMIBI 1 DOSE: 1 INJECTION INTRAVENOUS at 11:25

## 2023-10-25 RX ADMIN — GABAPENTIN 800 MG: 400 CAPSULE ORAL at 08:29

## 2023-10-25 NOTE — TELEPHONE ENCOUNTER
Please call and check on how Ms. Tomas is doing. She called the service last night and I directed her to the ED d/t elevated blood pressure and headache. Thanks

## 2023-10-25 NOTE — PROGRESS NOTES
MD Attestation Note    I supervised care provided by the midlevel provider.    The ALDO and I have discussed this patient's history, physical exam, and treatment plan. I have reviewed the documentation and personally had a face to face interaction with the patient  I affirm the documentation and agree with the treatment and plan.     I provided a substantive portion of the care of the patient.  I personally performed the physical exam in its entirety, and below are my findings.        Subjective  Pt is a 63 y.o. female admitted from Emergency Department for evaluation and treatment of chest pain, elevated blood pressure and headache.    Physical Exam  GENERAL: Alert and in NAD, Vitals reviewed  HENT: nares patent  EYES: no scleral icterus  CV: regular rhythm, regular rate  RESPIRATORY: normal effort  ABDOMEN: soft  MUSCULOSKELETAL: no deformity  NEURO: Strength sensation and coordination are grossly intact.  Speech and mentation are unremarkable  SKIN: warm, dry    Assessment/Plan  I discussed tx and evaluation of this patient with INDER Terrell.  Serial troponins negative.  Blood pressure markedly improved.  Appreciate cardiology consultation by Dr. Acuna.  Planning stress test later today.

## 2023-10-25 NOTE — ED PROVIDER NOTES
EMERGENCY DEPARTMENT ENCOUNTER    Room Number:  116/1  Date seen:  10/25/2023  PCP: Carrillo Drake MD  Historian(s): Patient      HPI:  Chief Complaint: Concerned about elevated blood pressure reading, chest pain, and headache  A complete HPI/ROS/PMH/PSH/SH/FH are unobtainable / limited due to: None  Context: Gabi Tomas is a 63 y.o. female who presents to the ED for evaluation because of elevated blood pressure reading today as well as chest pain and persistent headache.  Patient says she has had a migraine headache for the past 5 days now.  Yesterday she went to her pain management specialist and had a sacroiliac injection.  This was the first time she had had this type of injection for low back pain problem.  Since that injection she has had concerns about her blood pressure being very elevated when normally it is low.  Also she is complaining about pain in the central chest area that does not radiate.  It does not seem to affect her breathing.  She has had some poor appetite and poor activity today.  She says her pain is worse when she flexes her neck forward.        PAST MEDICAL HISTORY  Active Ambulatory Problems     Diagnosis Date Noted    Fibromyalgia 04/22/2016    Chronic depression 03/06/2014    Acquired hypothyroidism 04/22/2016    High cholesterol 03/06/2014    Vitamin D deficiency 04/22/2016    Antinuclear factor positive 08/28/2014    Gait instability 08/28/2014    Dyslipidemia 08/28/2014    Pain 06/06/2014    Autonomic neuropathy 05/16/2016    Intractable migraine with aura without status migrainosus 05/16/2016    Small fiber neuropathy 06/28/2016    Chronic bilateral low back pain without sciatica 12/08/2016    Chronic right shoulder pain 12/08/2016    Chest pain 03/23/2017    Lumbar spondylolysis 05/22/2017    Bilateral hand pain 10/12/2017    Dizziness 03/23/2018    Pelvic pain in female 03/28/2019    Uterine tenderness 03/28/2019    Renal stone 04/01/2019    Cervical lesion 04/01/2019     Morbidly obese 04/23/2019    Ovarian mass, left 05/15/2019    Acute laryngitis 11/01/2018    Acute upper respiratory infection 10/25/2018    Seasonal allergic rhinitis 04/24/2014    Anxiety 08/03/2016    Body mass index (BMI) 38.0-38.9, adult 02/07/2022    Cervical radiculopathy 02/18/2022    Claustrophobia 05/19/2014    Closed fracture of fifth metatarsal bone 12/02/2014    Constipation 01/10/2017    Contact with and (suspected) exposure to other viral communicable diseases 08/17/2020    COVID-19 08/17/2021    Cyst of Bartholin's gland duct 10/08/2019    Diverticulosis of colon 05/16/2014    Dysphagia 03/05/2014    Foot pain 06/21/2016    Gastritis 09/12/2014    Hematochezia 03/20/2014    Hemorrhoids 03/20/2014    Herpes zoster 12/18/2015    History of palpitations 08/17/2021    History of renal calculi 10/18/2019    Hyperplastic polyp of large intestine 05/16/2014    Influenza-like symptoms 02/01/2018    Memory loss 03/20/2014    Menopause 09/29/2014    Migraine headache 01/01/1998    Obstructive sleep apnea 09/12/2014    Osteoarthritis of multiple joints 11/11/2015    Other enthesopathies, not elsewhere classified 02/07/2022    Other long term (current) drug therapy 02/07/2022    Other specified conditions associated with female genital organs and menstrual cycle 03/28/2019    Parkinson's disease 02/07/2022    Personal history of COVID-19 02/07/2022    History of malignant neoplasm of cervix 06/06/2019    Right upper quadrant pain 03/20/2014    Rosacea 03/28/2016    Sciatica 02/01/2016    Seasonal allergies 09/12/2014    Sinusitis 09/11/2018    Sjogren's syndrome 08/03/2016    Subcutaneous mass of right lower extremity 08/28/2020    Tremor 03/20/2014    Urge incontinence of urine 04/17/2014    Urinary incontinence 03/20/2014    Verruca vulgaris 09/29/2015    Visual hallucination 09/11/2018    Abdominal wall pain in right upper quadrant 06/10/2022    Acute bronchitis 06/10/2022    Diverticulosis 06/10/2022    H/O:  hypothyroidism 06/10/2022    History of osteoarthritis 06/10/2022    H/O gastroesophageal reflux (GERD) 06/10/2022    H/O urinary incontinence 2021    Lumbar spondylosis 2022    Lumbosacral spondylosis without myelopathy 2022    DDD (degenerative disc disease), cervical 2023    Chronic pain syndrome 2023    Near syncope 2023    Major depressive disorder, recurrent episode, moderate with anxious distress 2023    Sacroiliitis 10/02/2023     Resolved Ambulatory Problems     Diagnosis Date Noted    PMB (postmenopausal bleeding) 2019     Past Medical History:   Diagnosis Date    Allergic     Anemia     Arthritis     Cancer     Cervical disc disorder 2023    Cervical dysplasia     Cervical mass     Chronic pain disorder -present    Depression     Difficulty walking     Eating disorder     Environmental allergies     Extremity pain -present    Fibromyalgia, primary     Fractures 11-25-15    GERD (gastroesophageal reflux disease)     Headache     Hyperlipidemia     Hypothyroidism     Kidney stone     Low back pain     Migraine     Neck pain 2019-present    Obesity     Orthostatic hypertension     Osteoarthritis     Peptic ulceration     Peripheral neuropathy     Shingles     Sleep apnea     Syncope     Urinary tract infection     Visual impairment          PAST SURGICAL HISTORY  Past Surgical History:   Procedure Laterality Date    CERVICAL CONIZATION      CERVICAL EPIDURAL N/A 2023    Procedure: CERVICAL EPIDURAL STEROID INJECTION C7-T1 #1  39576;  Surgeon: Rosie Cuevas MD;  Location: [facility] [place] MAIN OR;  Service: Pain Management;  Laterality: N/A;     SECTION PRIMARY      CHOLECYSTECTOMY      DILATATION AND CURETTAGE      EPIDURAL BLOCK   &     HIATAL HERNIA REPAIR  2023    MEDIAL BRANCH BLOCK Bilateral 2022    Procedure: LUMBAR MEDIAL BRANCH BLOCK Bilateral ~L3-S1;  Surgeon: Rosie Cuevas MD;  Location: [facility] [place]  MAIN OR;  Service: Pain Management;  Laterality: Bilateral;    MEDIAL BRANCH BLOCK Bilateral 2022    Procedure: LUMBAR MEDIAL BRANCH BLOCK BILATERAL L3-S1 #2;  Surgeon: Rosie Cuevas MD;  Location: [facility] MAIN OR;  Service: Pain Management;  Laterality: Bilateral;    NERVE SURGERY Left 2023    Procedure: LUMBAR RADIOFREQUENCY ABLATION LEFT L3-S1;  Surgeon: Rosie Cuevas MD;  Location: [facility] MAIN OR;  Service: Pain Management;  Laterality: Left;    NERVE SURGERY Right 02/15/2023    Procedure: LUMBAR RADIOFREQUENCY ABLATION RIGHT L3-S1;  Surgeon: Rosie Cuevas MD;  Location: [facility] MAIN OR;  Service: Pain Management;  Laterality: Right;    NISSEN FUNDOPLICATION LAPAROSCOPIC  2023    SACROILIAC JOINT INJECTION Left 10/23/2023    Procedure: SACROILIAC JOINT INJECTION LEFT 28065;  Surgeon: Rosie Cuevas MD;  Location: [facility] MAIN OR;  Service: Pain Management;  Laterality: Left;         FAMILY HISTORY  Family History   Problem Relation Age of Onset    Lymphoma Mother     Depression Mother     Migraines Mother     Cancer Mother         Non-hodgkins lymphoma    Hypertension Mother         Non-Hodkins Lymphoma/ at 47    Cancer Father         Lung & brain    Heart attack Father     Psoriasis Father     Alcohol abuse Father     Hypertension Father         Also had lung cancer metastasize to brain/ at 60    Aneurysm Brother     COPD Brother     Heart disease Brother     Heart attack Brother     Hypertension Brother         Hypertension, aneurysm near heart, 3 other aneurysms, dimentia    Migraines Son     Cancer Maternal Aunt     Cancer Maternal Uncle     Cancer Paternal Aunt     Heart disease Paternal Aunt     Cancer Paternal Uncle     Heart disease Paternal Uncle     Aneurysm Brother     Lung disease Brother     Alcohol abuse Brother     ADD / ADHD Son     Migraines Son          SOCIAL HISTORY  Social History     Socioeconomic History    Marital status:    Tobacco Use    Smoking  status: Passive Smoke Exposure - Never Smoker    Smokeless tobacco: Never    Tobacco comments:     Socially/rarely   Vaping Use    Vaping Use: Never used   Substance and Sexual Activity    Alcohol use: Yes     Comment: Social/Rarely    Drug use: Never    Sexual activity: Not Currently     Partners: Male     Birth control/protection: None         ALLERGIES  Ampicillin, Cyclobenzaprine, Levofloxacin, Milnacipran, Quinolones, Savella [milnacipran hcl], Sulfa antibiotics, and Pregabalin        REVIEW OF SYSTEMS  Review of Systems   Constitutional:  Positive for activity change. Negative for fever.   Eyes:  Negative for pain and visual disturbance.   Respiratory:  Negative for cough and shortness of breath.    Cardiovascular:  Positive for chest pain.   Gastrointestinal:  Negative for abdominal pain.   Genitourinary:  Negative for dysuria.   Musculoskeletal:  Positive for back pain, myalgias and neck pain.   Skin:  Negative for color change.   Neurological:  Positive for headaches. Negative for syncope.   All other systems reviewed and are negative.           PHYSICAL EXAM  ED Triage Vitals [10/24/23 2027]   Temp Heart Rate Resp BP SpO2   98.2 °F (36.8 °C) 92 20 176/95 98 %      Temp src Heart Rate Source Patient Position BP Location FiO2 (%)   Tympanic -- -- -- --       Physical Exam      GENERAL: Calm, no diaphoresis, no acute distress  HENT: nares patent, normocephalic and atraumatic  Neck: Supple, no soft tissue swelling.  Patient demonstrates some decreased range of motion for flexion of the neck because of pain that increases in her chest and low back area.  EYES: no scleral icterus, EOMI, normal conjunctivae  CV: regular rhythm, normal rate, normal distal pulses, no murmurs  RESPIRATORY: normal effort, no stridor, lungs clear to auscultation bilaterally  ABDOMEN: soft, nontender  MUSCULOSKELETAL: no deformity, no asymmetry, no significant edema  NEURO: alert, moves all extremities, follows commands, no focal motor  deficits.  PSYCH:  calm, cooperative  SKIN: warm, dry    Vital signs and nursing notes reviewed.        LAB RESULTS  Recent Results (from the past 24 hour(s))   ECG 12 Lead Chest Pain    Collection Time: 10/24/23  8:23 PM   Result Value Ref Range    QT Interval 370 ms    QTC Interval 411 ms   Comprehensive Metabolic Panel    Collection Time: 10/24/23  8:55 PM    Specimen: Blood   Result Value Ref Range    Glucose 104 (H) 65 - 99 mg/dL    BUN 9 8 - 23 mg/dL    Creatinine 0.72 0.57 - 1.00 mg/dL    Sodium 143 136 - 145 mmol/L    Potassium 3.9 3.5 - 5.2 mmol/L    Chloride 107 98 - 107 mmol/L    CO2 23.8 22.0 - 29.0 mmol/L    Calcium 9.5 8.6 - 10.5 mg/dL    Total Protein 6.4 6.0 - 8.5 g/dL    Albumin 4.6 3.5 - 5.2 g/dL    ALT (SGPT) 21 1 - 33 U/L    AST (SGOT) 21 1 - 32 U/L    Alkaline Phosphatase 93 39 - 117 U/L    Total Bilirubin 0.3 0.0 - 1.2 mg/dL    Globulin 1.8 gm/dL    A/G Ratio 2.6 g/dL    BUN/Creatinine Ratio 12.5 7.0 - 25.0    Anion Gap 12.2 5.0 - 15.0 mmol/L    eGFR 95.3 >60.0 mL/min/1.73   Protime-INR    Collection Time: 10/24/23  8:55 PM    Specimen: Blood   Result Value Ref Range    Protime 12.1 11.7 - 14.2 Seconds    INR 0.89 (L) 0.90 - 1.10   aPTT    Collection Time: 10/24/23  8:55 PM    Specimen: Blood   Result Value Ref Range    PTT 24.1 22.7 - 35.4 seconds   Single High Sensitivity Troponin T    Collection Time: 10/24/23  8:55 PM    Specimen: Blood   Result Value Ref Range    HS Troponin T 10 (H) <10 ng/L   CBC Auto Differential    Collection Time: 10/24/23  8:55 PM    Specimen: Blood   Result Value Ref Range    WBC 16.15 (H) 3.40 - 10.80 10*3/mm3    RBC 5.12 3.77 - 5.28 10*6/mm3    Hemoglobin 15.3 12.0 - 15.9 g/dL    Hematocrit 46.3 34.0 - 46.6 %    MCV 90.4 79.0 - 97.0 fL    MCH 29.9 26.6 - 33.0 pg    MCHC 33.0 31.5 - 35.7 g/dL    RDW 13.3 12.3 - 15.4 %    RDW-SD 44.4 37.0 - 54.0 fl    MPV 10.8 6.0 - 12.0 fL    Platelets 269 140 - 450 10*3/mm3    Neutrophil % 82.2 (H) 42.7 - 76.0 %    Lymphocyte %  12.9 (L) 19.6 - 45.3 %    Monocyte % 4.3 (L) 5.0 - 12.0 %    Eosinophil % 0.0 (L) 0.3 - 6.2 %    Basophil % 0.2 0.0 - 1.5 %    Immature Grans % 0.4 0.0 - 0.5 %    Neutrophils, Absolute 13.27 (H) 1.70 - 7.00 10*3/mm3    Lymphocytes, Absolute 2.08 0.70 - 3.10 10*3/mm3    Monocytes, Absolute 0.70 0.10 - 0.90 10*3/mm3    Eosinophils, Absolute 0.00 0.00 - 0.40 10*3/mm3    Basophils, Absolute 0.03 0.00 - 0.20 10*3/mm3    Immature Grans, Absolute 0.07 (H) 0.00 - 0.05 10*3/mm3    nRBC 0.0 0.0 - 0.2 /100 WBC   D-dimer, Quantitative    Collection Time: 10/24/23  8:55 PM    Specimen: Blood   Result Value Ref Range    D-Dimer, Quantitative 0.34 0.00 - 0.61 MCGFEU/mL   ECG 12 Lead Chest Pain    Collection Time: 10/24/23 10:46 PM   Result Value Ref Range    QT Interval 438 ms    QTC Interval 419 ms   ECG 12 Lead Rhythm Change    Collection Time: 10/25/23  1:09 AM   Result Value Ref Range    QT Interval 418 ms    QTC Interval 428 ms       Ordered the above labs and reviewed the results.        RADIOLOGY  XR Chest 1 View    Result Date: 10/25/2023  EXAM: XR CHEST 1 VW-  INDICATION: Chest pain  COMPARISON: Radiographs dating back to 1/14/2017       Stable left lower lobe linear atelectasis/scarring. No focal consolidation. No pleural effusion or pneumothorax. Normal size cardiomediastinal silhouette. No focal osseous abnormality.    This report was finalized on 10/25/2023 12:17 AM by Dr. Claude Leiva M.D on Workstation: [id number]       Ordered the above noted radiological studies. Reviewed by me in PACS.          PROCEDURES  Procedures        MEDICATIONS GIVEN IN ER  Medications   sodium chloride 0.9 % flush 10 mL (has no administration in time range)   nitroglycerin (NITROSTAT) ointment 1 inch (1 inch Topical Not Given 10/25/23 0036)   sennosides-docusate (PERICOLACE) 8.6-50 MG per tablet 2 tablet (2 tablets Oral Not Given 10/25/23 0036)     And   polyethylene glycol (MIRALAX) packet 17 g (has no administration in time range)      And   bisacodyl (DULCOLAX) EC tablet 5 mg (has no administration in time range)     And   bisacodyl (DULCOLAX) suppository 10 mg (has no administration in time range)   nitroglycerin (NITROSTAT) SL tablet 0.4 mg (has no administration in time range)   DULoxetine (CYMBALTA) DR capsule 60 mg (has no administration in time range)   gabapentin (NEURONTIN) capsule 800 mg (has no administration in time range)   sodium chloride 0.9 % infusion (100 mL/hr Intravenous New Bag 10/25/23 0132)   sodium chloride 0.9 % bolus 500 mL (0 mL Intravenous Stopped 10/24/23 2213)   morphine injection 4 mg (4 mg Intravenous Given 10/24/23 2109)   ondansetron (ZOFRAN) injection 4 mg (4 mg Intravenous Given 10/24/23 2112)   aspirin chewable tablet 324 mg (324 mg Oral Given 10/24/23 2251)   prochlorperazine (COMPAZINE) injection 5 mg (5 mg Intravenous Given 10/25/23 0102)   diphenhydrAMINE (BENADRYL) injection 25 mg (25 mg Intravenous Given 10/25/23 0102)           MEDICAL DECISION MAKING, PROGRESS, and CONSULTS    All labs have been independently reviewed by me.  All radiology studies have been reviewed by me and I have also reviewed the radiology report.   EKG's independently viewed and interpreted by me.  Discussion below represents my analysis of pertinent findings related to patient's condition, differential diagnosis, treatment plan and final disposition.      Additional sources:    - External (non-ED) record review: I reviewed the operation note from pain management from yesterday's date, October 23, 2023 when she had a sacroiliac joint injection with fluoroscopic guidance.  10 mg of dexamethasone was used in addition to 1% lidocaine.  1 mL of Isovue contrast also utilized.      Additional orders considered but not ordered:  I considered ordering CT angiogram the chest to evaluate for pulmonary embolism, however since her D-dimer is negative range I think that diagnosis is very unlikely.  Therefore I will avoid CT imaging at this  time.    Orders placed during this visit:  Orders Placed This Encounter   Procedures    XR Chest 1 View    Comprehensive Metabolic Panel    Protime-INR    aPTT    Single High Sensitivity Troponin T    CBC Auto Differential    D-dimer, Quantitative    Basic Metabolic Panel    CBC (No Diff)    Lipid Panel    Magnesium    High Sensitivity Troponin T    High Sensitivity Troponin T    NPO Diet NPO Type: Strict NPO    Monitor Blood Pressure    Vital Signs    Oral Care    Vital Signs Every 15 Minutes Until Stable, Then Every 4 Hours    Telemetry - Maintain IV Access    Telemetry - Place Orders & Notify Provider of Results When Patient Experiences Acute Chest Pain, Dysrhythmia or Respiratory Distress    May Be Off Telemetry for Tests    Pulse Oximetry, Continuous    Notify Physician For Unrelieved Chest Pain    Place Sequential Compression Device    Maintain Sequential Compression Device    Code Status and Medical Interventions:    Inpatient Cardiology Consult    ECG 12 Lead Chest Pain    ECG 12 Lead Chest Pain    ECG 12 Lead Chest Pain    ECG 12 Lead Rhythm Change    Insert Peripheral IV    Initiate ED Observation Status    CBC & Differential           Differential diagnosis includes but is not limited to:    Acute MI, hypertension, steroid-induced elevated blood pressure, migraine headache, dehydration, GERD      Independent interpretation of labs, radiology studies, and discussions with consultants:  ED Course as of 10/25/23 0141   e Oct 24, 2023   2057 Blood pressure is indeed elevated at triage here.  We will continue to monitor that carefully on telemetry.  Patient reports having some chest pain but that is not her only complaint nor is it her primary complaint as far as I can tell.  We will proceed with typical cardiac work-up including chest x-ray imaging, troponin and D-dimer testing.  Starting some IV fluids and analgesia medicines for her as well. [JEWELS]   2057 EKG           EKG time/Interp time:  2023/2025  Rhythm/Rate: Sinus rhythm, 74 bpm  P waves and AR: Present, 188 ms  QRS, axis: 86 ms, normal axis  ST and T waves: There are no obvious ST segment elevations present.  The ST segments for anterior leads, V1, V2 have a somewhat biphasic appearance which is new in comparison to previous exam on file from July 23, 2023.    Independently interpreted by me contemporaneously with treatment     [JEWELS]   2145 WBC(!): 16.15  Elevated white blood cell count may be related to recent steroid injection [JEWELS]   2145 I independently interpreted the Chest X-ray and my findings are: Low lung volumes, no Pneumothorax, No Effusion, No Infiltrate   [JEWELS]   2145 D-Dimer, Quant: 0.34  D-dimer is normal range and therefore I do not think that CTA of the chest is necessary at this time. [JEWELS]   2146 HS Troponin T(!): 10 [JEWELS]   2245 Blood pressure is rising here and patient is complaining of some increasing chest pain now.  I will give her aspirin and add some nitroglycerin paste.  Repeating an EKG also. [JEWELS]   2336 I discussed with Bennett in the observation unit about the patient.  He agrees to accept her for further medical management tonight. [JEWELS]   Wed Oct 25, 2023   0140 EKG           EKG time/Interp time: 2246/2256  Rhythm/Rate: Sinus rhythm, 55 bpm  P waves and AR: Present, 183 ms  QRS, axis: 91 ms, normal axis  ST and T waves: No ST segment elevations are notable.    Independently interpreted by me contemporaneously with treatment     [JEWELS]      ED Course User Index  [JEWELS] Prabhjot Baugh MD         DIAGNOSIS  Final diagnoses:   Chest pain, unspecified type   Elevated blood pressure reading   Nonintractable headache, unspecified chronicity pattern, unspecified headache type         DISPOSITION  To observation unit        Latest Documented Vital Signs:  As of 01:41 EDT  BP- 142/75 HR- 61 Temp- 97.5 °F (36.4 °C) (Oral) O2 sat- 97%              --    Please note that portions of this were completed with a voice recognition  program.       Note Disclaimer: At [facility], we believe that sharing information builds trust and better relationships. You are receiving this note because you are receiving care at [facility] or recently visited. It is possible you will see health information before a provider has talked with you about it. This kind of information can be easy to misunderstand. To help you fully understand what it means for your health, we urge you to discuss this note with your provider.             Prabhjot Baugh MD  10/25/23 0129       Prabhjot Baugh MD  10/25/23 0141

## 2023-10-25 NOTE — PLAN OF CARE
Goal Outcome Evaluation:            Pt admitted to obs for chest pain/headache. EKG normal. Troponin's 10, 10. Migraine cocktail given. Cardiology consulted. VSS, A/Ox4, room air.

## 2023-10-25 NOTE — PLAN OF CARE
Goal Outcome Evaluation:   Pt is being discharged and leaving via spouse vehicle. Pt chest pain has resolved. Stress test was negative. Pt is to follow up with cardiology in 2-4 weeks and PCP in 1-2 weeks. Pt has no questions or concerns at this time. Pt leaving via wheelchair.

## 2023-10-25 NOTE — DISCHARGE INSTRUCTIONS
Your stress test today was negative.  Follow-up with cardiology Dr. Acuna in 2 to 4 weeks, their office will call you to schedule.  Return to the emergency department with worsening symptoms, uncontrolled pain, inability to tolerate oral liquids, fever greater than 101°F not controlled by Tylenol or as needed with emergent concerns.

## 2023-10-25 NOTE — DISCHARGE SUMMARY
ED OBSERVATION PROGRESS/DISCHARGE SUMMARY    Date of Admission: 10/24/2023   LOS: 0 days   PCP: Carrillo Drake MD    Subjective this morning patient reported resolution of her headache, some mild chest pressure.  Following stress test, patient denies complaints.    Hospital Outcome: 63-year-old female admitted to the observation unit for further evaluation of chest pain.  High-sensitivity troponin 10, 10, <6, chest x-ray shows stable left lower lobe linear atelectasis/scarring, negative acute.  EKG this morning shows sinus rhythm with rate of 63 bpm.    Patient was seen by cardiology, Dr. Acuna.  Patient had stress test today which was consistent with low risk study.  Patient does have mild orthostatic hypotension and she does follow at [facility].  However, cardiology would like to see her in follow-up in 2 to 4 weeks at which time they will discuss midodrine.    Patient also had worsening migraine headache at time of admission.  She received Benadryl and Compazine in ED and reports improvement in her headache.  Denies headache at time of discharge.  Usual return to ER precautions discussed with patient who expresses understanding and is in agreement with plan.      ROS:  General: no fevers, chills  Respiratory: no cough, dyspnea  Cardiovascular: no chest pain, palpitations  Abdomen: No abdominal pain, nausea, vomiting, or diarrhea  Neurologic: No focal weakness    Objective   Physical Exam:  I have reviewed the vital signs.  Temp:  [97.5 °F (36.4 °C)-98.2 °F (36.8 °C)] 97.7 °F (36.5 °C)  Heart Rate:  [59-92] 61  Resp:  [16-20] 18  BP: ()/(46-95) 116/64  General Appearance:    Alert, cooperative, no distress  Head:    Normocephalic, atraumatic  Eyes:    Sclerae anicteric  Neck:   Supple, no mass  Lungs: Clear to auscultation bilaterally, respirations unlabored  Heart: Regular rate and rhythm, S1 and S2 normal, no murmur, rub or gallop  Abdomen:  Soft, nontender, bowel sounds active,  nondistended  Extremities: No clubbing, cyanosis, or edema to lower extremities  Pulses:  2+ and symmetric in distal lower extremities  Skin: No rashes   Neurologic: Oriented x3, Normal strength to extremities    Results Review:    I have reviewed the labs, radiology results and diagnostic studies.    Results from last 7 days   Lab Units 10/25/23  0538   WBC 10*3/mm3 10.53   HEMOGLOBIN g/dL 13.9   HEMATOCRIT % 41.3   PLATELETS 10*3/mm3 221     Results from last 7 days   Lab Units 10/25/23  0538 10/24/23  2055   SODIUM mmol/L 142 143   POTASSIUM mmol/L 3.9 3.9   CHLORIDE mmol/L 112* 107   CO2 mmol/L 21.0* 23.8   BUN mg/dL 10 9   CREATININE mg/dL 0.65 0.72   CALCIUM mg/dL 8.6 9.5   BILIRUBIN mg/dL  --  0.3   ALK PHOS U/L  --  93   ALT (SGPT) U/L  --  21   AST (SGOT) U/L  --  21   GLUCOSE mg/dL 93 104*     Imaging Results (Last 24 Hours)       Procedure Component Value Units Date/Time    XR Chest 1 View [[id number]] Collected: 10/25/23 0013     Updated: 10/25/23 0020    Narrative:      EXAM: XR CHEST 1 VW-     INDICATION: Chest pain     COMPARISON: Radiographs dating back to 1/14/2017          Impression:      Stable left lower lobe linear atelectasis/scarring. No focal  consolidation. No pleural effusion or pneumothorax. Normal size  cardiomediastinal silhouette. No focal osseous abnormality.           This report was finalized on 10/25/2023 12:17 AM by Dr. Claude Leiva M.D on Workstation: [id number]             Stress test 10/25/2023    Breast attenuation artifact is present.    Myocardial perfusion imaging indicates a normal myocardial perfusion study with no evidence of ischemia.    Left ventricular ejection fraction is hyperdynamic (Calculated EF > 70%).    Impressions are consistent with a low risk study.    I have reviewed the medications.  ---------------------------------------------------------------------------------------------  Assessment & Plan   Assessment/Problem List    Chest pain      Plan:    Chest  pain  -Troponin 10, 10, <5  -EKG reviewed, NSR, no acute injury pattern  -Cardiology consult  -Cardiac monitoring  -N.p.o.  -Stress test consistent with low risk study    Orthostatic hypotension  Dysautonomia  -Continue follow-up at [facility]  -Follow-up with cardiology in 2 to 4 weeks     Migraine  -Received Benadryl and Compazine  -IVF  -Resolved    Disposition: Home    Follow-up after Discharge: PCP, cardiology    30 minutes has been spent by [facility] providers in the care of this patient while under observation status     This note will serve as discharge summary    INDER Sherman 10/25/23 08:05 EDT    I have worn appropriate PPE during this patient encounter and sanitized my hands with entering and exiting patient room.

## 2023-10-25 NOTE — ED NOTES
Nursing report ED to floor  Gabi Tomas  63 y.o.  female    HPI :   Chief Complaint   Patient presents with    Hypertension       Admitting doctor:   Babatunde Lewis MD    Admitting diagnosis:   There were no encounter diagnoses.    Code status:   Current Code Status       Date Active Code Status Order ID Comments User Context       10/24/2023 2336 CPR (Attempt to Resuscitate) [id number]  Madison Hyde APRN ED        Question Answer    Code Status (Patient has no pulse and is not breathing) CPR (Attempt to Resuscitate)    Medical Interventions (Patient has pulse or is breathing) Full Support    Level Of Support Discussed With Patient                    Allergies:   Ampicillin, Cyclobenzaprine, Levofloxacin, Milnacipran, Quinolones, Savella [milnacipran hcl], Sulfa antibiotics, and Pregabalin    Isolation:   No active isolations    Intake and Output  No intake or output data in the 24 hours ending 10/25/23 0000    Weight:       10/24/23  2029   Weight: 90.7 kg (200 lb)       Most recent vitals:   Vitals:    10/24/23 2148 10/24/23 2216 10/24/23 2316 10/24/23 2346   BP: 159/90 161/89 156/89 155/90   Pulse: 62 61 59 62   Resp:   16 16   Temp:       TempSrc:       SpO2: 94% 93% 97% 93%   Weight:       Height:           Active LDAs/IV Access:   Lines, Drains & Airways       Active LDAs       Name Placement date Placement time Site Days    Peripheral IV 10/24/23 2058 Right Antecubital 10/24/23  2058  Antecubital  less than 1                    Labs (abnormal labs have a star):   Labs Reviewed   COMPREHENSIVE METABOLIC PANEL - Abnormal; Notable for the following components:       Result Value    Glucose 104 (*)     All other components within normal limits    Narrative:     GFR Normal >60  Chronic Kidney Disease <60  Kidney Failure <15     PROTIME-INR - Abnormal; Notable for the following components:    INR 0.89 (*)     All other components within normal limits   SINGLE HSTROPONIN T - Abnormal; Notable for the  "following components:    HS Troponin T 10 (*)     All other components within normal limits    Narrative:     High Sensitive Troponin T Reference Range:  <10.0 ng/L- Negative Female for AMI  <15.0 ng/L- Negative Male for AMI  >=10 - Abnormal Female indicating possible myocardial injury.  >=15 - Abnormal Male indicating possible myocardial injury.   Clinicians would have to utilize clinical acumen, EKG, Troponin, and serial changes to determine if it is an Acute Myocardial Infarction or myocardial injury due to an underlying chronic condition.        CBC WITH AUTO DIFFERENTIAL - Abnormal; Notable for the following components:    WBC 16.15 (*)     Neutrophil % 82.2 (*)     Lymphocyte % 12.9 (*)     Monocyte % 4.3 (*)     Eosinophil % 0.0 (*)     Neutrophils, Absolute 13.27 (*)     Immature Grans, Absolute 0.07 (*)     All other components within normal limits   APTT - Normal   D-DIMER, QUANTITATIVE - Normal    Narrative:     According to the assay 's published package insert, a normal (<0.50 MCGFEU/mL) D-dimer result in conjunction with a non-high clinical probability assessment, excludes deep vein thrombosis (DVT) and pulmonary embolism (PE) with high sensitivity.    D-dimer values increase with age and this can make VTE exclusion of an older population difficult. To address this, the [employer], based on best available evidence and recent guidelines, recommends that clinicians use age-adjusted D-dimer thresholds in patients greater than 50 years of age with: a) a low probability of PE who do not meet all Pulmonary Embolism Rule Out Criteria, or b) in those with intermediate probability of PE.   The formula for an age-adjusted D-dimer cut-off is \"age/100\".  For example, a 60 year old patient would have an age-adjusted cut-off of 0.60 MCGFEU/mL and an 80 year old 0.80 MCGFEU/mL.   BASIC METABOLIC PANEL   CBC (NO DIFF)   LIPID PANEL   MAGNESIUM   TROPONIN   CBC AND DIFFERENTIAL    " Narrative:     The following orders were created for panel order CBC & Differential.  Procedure                               Abnormality         Status                     ---------                               -----------         ------                     CBC Auto Differential[[id number]]        Abnormal            Final result                 Please view results for these tests on the individual orders.       EKG:   ECG 12 Lead Chest Pain   Preliminary Result   HEART RATE= 55  bpm   RR Interval= 1091  ms   MA Interval= 183  ms   P Horizontal Axis= -31  deg   P Front Axis= -23  deg   QRSD Interval= 91  ms   QT Interval= 438  ms   QTcB= 419  ms   QRS Axis= -5  deg   T Wave Axis= 35  deg   - BORDERLINE ECG -   Sinus rhythm   Low voltage, precordial leads   Abnormal R-wave progression, late transition   Borderline T abnormalities, anterior leads   Electronically Signed By:    Date and Time of Study: 2023-10-24 22:46:17      ECG 12 Lead Chest Pain   Preliminary Result   HEART RATE= 74  bpm   RR Interval= 811  ms   MA Interval= 188  ms   P Horizontal Axis= -3  deg   P Front Axis= 58  deg   QRSD Interval= 86  ms   QT Interval= 370  ms   QTcB= 411  ms   QRS Axis= -13  deg   T Wave Axis= 33  deg   - BORDERLINE ECG -   Sinus rhythm   Consider anterior infarct   Electronically Signed By:    Date and Time of Study: 2023-10-24 20:23:04          Meds given in ED:   Medications   sodium chloride 0.9 % flush 10 mL (has no administration in time range)   nitroglycerin (NITROSTAT) ointment 1 inch (1 inch Topical Given 10/24/23 5727)   sennosides-docusate (PERICOLACE) 8.6-50 MG per tablet 2 tablet (has no administration in time range)     And   polyethylene glycol (MIRALAX) packet 17 g (has no administration in time range)     And   bisacodyl (DULCOLAX) EC tablet 5 mg (has no administration in time range)     And   bisacodyl (DULCOLAX) suppository 10 mg (has no administration in time range)   nitroglycerin (NITROSTAT) SL tablet  0.4 mg (has no administration in time range)   sodium chloride 0.9 % bolus 500 mL (0 mL Intravenous Stopped 10/24/23 2213)   morphine injection 4 mg (4 mg Intravenous Given 10/24/23 2109)   ondansetron (ZOFRAN) injection 4 mg (4 mg Intravenous Given 10/24/23 2112)   aspirin chewable tablet 324 mg (324 mg Oral Given 10/24/23 2251)       Imaging results:  No radiology results for the last day    Ambulatory status:   - x1 assist    Social issues:   Social History     Socioeconomic History    Marital status:    Tobacco Use    Smoking status: Passive Smoke Exposure - Never Smoker    Smokeless tobacco: Never    Tobacco comments:     Socially/rarely   Vaping Use    Vaping Use: Never used   Substance and Sexual Activity    Alcohol use: Yes     Comment: Social/Rarely    Drug use: Never    Sexual activity: Not Currently     Partners: Male     Birth control/protection: None       NIH Stroke Scale:       Saranya Lockhart RN  10/25/23 00:00 EDT

## 2023-10-25 NOTE — CONSULTS
[facility]        Patient Name: Gabi Tomas  Age/Sex: 63 y.o. female  : 1960  MRN: [id number]    Date of Admission: 10/24/2023  Date of Encounter Visit: 10/25/23  Encounter Provider: Olvin Acuna MD  Referring Provider: No ref. provider found  Place of Service: [facility] CARDIOLOGY  Patient Care Team:  Carrillo Drake MD as PCP - General (Family Medicine)    Subjective:     Chief Complaint: CP and headache    Reason for consult: chest pain     History of Present Illness:  Gabi Tomas is a 63 y.o. female  with a history of autonomic dysfunction, fibromyalgia, cervical radiculopathy, immobility, migraines, neuropathy, obstructive sleep apnea, and  kidney stones. An ECHO in 2018 at  [facility] showed EF 66% and was otherwise normal.     In , there was concern she had Parkinson since disease and was referred to [facility] movement disorder specialist; at that time it was felt she did not have Parkinson's but had dystonia.  Neuro cardio autonomic reflex testing was done at [facility] which was consistent with significant cardiovagal and cardiovascular adrenergic abnormalities.  A QSART study was also done which favored preganglionic/central autonomic disorder.  She was evaluated at the [facility] dysautonomia clinic.  She also had a thermoregulatory sweat test but cannot tolerate it for greater than 29 minutes.  Evaluation at [facility] in 2021 showed mild orthostatic hypotension with mild to moderate impairment of the autonomic reflexes.     An ECHO in  showed  EF 61 to 65%, grade 1 LV diastolic dysfunction, and was otherwise unremarkable.     Pt presented to ER on 23 with lightheadedness and tingling in both hands and feeling like she was going to pass out with episodes of confusion. In ER, Creatinine 0.79, potassium 4.1, WBC 5.95, hemoglobin 15.0.  Chest ray was negative. We were consulted for  syncope. It was felt her autonomic neuropathy symptoms got worse after her Cymbalta was increased to 90 mg in the morning and 60 in the evening.  This was switched to 60 mg twice a day. An ECHO showed normal LVSF and EF of 61-65%. During this admission she complained of vague chest pain and EKG and troponin were negative. It was felt pt was very anxious and t most of her symptoms were somatic symptoms. She was started on low dose clonazepam twice a day and felt much better. A Holter was placed at discharge. A Holter on 8/14/23 was normal.      Patient was admitted with chest pain.  We have been asked to be seen for chest pain.     Pt presented to ER on 10/24/23 with complaints of headache and chest pain. She reported he has had a headache for the past 5 days that he described as a dull ache in bilateral parietal region with associated photophobia and phonophobia. She reported taking Toradol, Excedrin Migraine and Tylenol with no improvement. Pt reported that yesterday she had a sacroiliac joint injection at [facility] pain clinic and was in recovery and developed mid sternal chest pain but she did not report it. Then around 6 pm she developed mid sternal chest pain that she described as sharp and stabbing with associated jaw pain. She denied any associated nausea, vomiting, diaphoresis, or back pain.  The chest pain was a dull, ache, with occasional stabbing sensations that would occur sometimes in a radicular origin around her back, and sometimes towards her right arm.  It would happen sporadically, without warning.  She denied any fevers, chills. Cough or lower extremity edema. In ER, troponin T 10, CR 0.72, D dimer 0.34, INR 0.89, WBC 16.15, HGB 15.3, PLTs 269, CXR showed nothing active, EKG nonischemic.          Holter 8/14/23    A normal monitor study.    Underlying heart rhythm was sinus rhythm with an average heart rate of 78 bpm and a heart rate range of 58 bpm up to 112 bpm    Patient triggered event  correlated with underlying sinus rhythm with no significant arrhythmia noted during study         ECHO 7/22/23  Left ventricular systolic function is normal. Left ventricular ejection fraction appears to be 61 - 65%.    Left ventricular diastolic function is consistent with (grade I) impaired relaxation.    Normal right ventricular cavity size and systolic function noted.    Insufficient TR velocity profile to estimate the right ventricular systolic pressure.    There is no evidence of pericardial effusion.      Stress test 4/18/17    Findings consistent with a normal ECG stress test.  Left ventricular ejection fraction is hyperdynamic (Calculated EF > 70%).  Myocardial perfusion imaging indicates a normal myocardial perfusion study with no evidence of ischemia.  Impressions are consistent with a low risk study.     Past Medical History:  Past Medical History:   Diagnosis Date    Allergic     Anemia     Anxiety     Arthritis     Autonomic neuropathy     Cancer 1988    cervical    Cervical disc disorder June 2023    Cervical dysplasia     Conization in the past    Cervical mass     Chronic pain disorder 2013-present    Depression     Difficulty walking     Eating disorder     Environmental allergies     Extremity pain 2013-present    Fibromyalgia, primary     Fractures 11-25-15    Lost bal & broke foot in 2 places    GERD (gastroesophageal reflux disease)     Headache     Hyperlipidemia     Hypothyroidism     Kidney stone     Low back pain     Migraine     Neck pain Feb 2019-present    Obesity     Orthostatic hypertension     Osteoarthritis 2014    Diagnosed by local rheum    Peptic ulceration     Peripheral neuropathy     Shingles     Sleep apnea     Syncope     Tremor     Urinary tract infection     Visual impairment     glasses       Past Surgical History:   Procedure Laterality Date    CERVICAL CONIZATION      CERVICAL EPIDURAL N/A 08/02/2023    Procedure: CERVICAL EPIDURAL STEROID INJECTION C7-T1 #1  89382;   Surgeon: Rosie Cuevas MD;  Location: [facility];  Service: Pain Management;  Laterality: N/A;     SECTION PRIMARY      CHOLECYSTECTOMY  2019    DILATATION AND CURETTAGE      EPIDURAL BLOCK   &     HIATAL HERNIA REPAIR  2023    MEDIAL BRANCH BLOCK Bilateral 2022    Procedure: LUMBAR MEDIAL BRANCH BLOCK Bilateral ~L3-S1;  Surgeon: Rosie Cuevas MD;  Location: [facility];  Service: Pain Management;  Laterality: Bilateral;    MEDIAL BRANCH BLOCK Bilateral 2022    Procedure: LUMBAR MEDIAL BRANCH BLOCK BILATERAL L3-S1 #2;  Surgeon: Rosie Cuevas MD;  Location: [facility];  Service: Pain Management;  Laterality: Bilateral;    NERVE SURGERY Left 2023    Procedure: LUMBAR RADIOFREQUENCY ABLATION LEFT L3-S1;  Surgeon: Rosie Cuevas MD;  Location: [facility];  Service: Pain Management;  Laterality: Left;    NERVE SURGERY Right 02/15/2023    Procedure: LUMBAR RADIOFREQUENCY ABLATION RIGHT L3-S1;  Surgeon: Rosie Cuevas MD;  Location: [facility];  Service: Pain Management;  Laterality: Right;    NISSEN FUNDOPLICATION LAPAROSCOPIC  2023    SACROILIAC JOINT INJECTION Left 10/23/2023    Procedure: SACROILIAC JOINT INJECTION LEFT [id number];  Surgeon: Rosie Cuevas MD;  Location: [facility];  Service: Pain Management;  Laterality: Left;       Home Medications:   Medications Prior to Admission   Medication Sig Dispense Refill Last Dose    DULoxetine (CYMBALTA) 60 MG capsule Take 1 capsule by mouth 2 (Two) Times a Day.   10/25/2023    etodolac (LODINE) 400 MG tablet Take 1 tablet by mouth 2 (Two) Times a Day As Needed (pain). 60 tablet 0 Past Month    fluticasone (FLONASE) 50 MCG/ACT nasal spray 2 sprays into the nostril(s) as directed by provider Daily.   10/25/2023    gabapentin (NEURONTIN) 800 MG tablet Take 1 tablet by mouth 2 (Two) Times a Day. Pt reports she only takes am and PM dose; 60 tablet 0 10/25/2023    ketorolac (TORADOL) 10 MG tablet Take 1  "tab PO every 6 hours as needed for pain. 12 tablet 0 10/25/2023    lidocaine (LMX) 4 % cream Apply 1 application  topically to the appropriate area as directed 4 (Four) Times a Day As Needed for Mild Pain. 15 g 3 10/24/2023    montelukast (SINGULAIR) 10 MG tablet Take 1 tablet by mouth.   10/25/2023    ondansetron ODT (ZOFRAN-ODT) 4 MG disintegrating tablet As Needed.   Past Week    vitamin B-12 (CYANOCOBALAMIN) 1000 MCG tablet TAKE 1 TABLET BY MOUTH EVERY DAY 30 tablet 5 10/25/2023    vitamin D (ERGOCALCIFEROL) 1.25 MG (05350 UT) capsule capsule Take 1 capsule by mouth 1 (One) Time Per Week. Mondays   Past Week    clonazePAM (KlonoPIN) 0.5 MG tablet Take 1 tablet by mouth Every 12 (Twelve) Hours for 14 days. (Patient taking differently: Take 0.5 tablets by mouth Every 12 (Twelve) Hours.) 28 tablet 0        Allergies:  Allergies   Allergen Reactions    Ampicillin Anaphylaxis, Rash and Other (See Comments)    Cyclobenzaprine Other (See Comments)     \"made me sleep for 2 days\"    Levofloxacin Unknown - Low Severity    Milnacipran Other (See Comments)     Palpitations, rectal bleeding    Quinolones Other (See Comments)     Neurological issues     Savella [Milnacipran Hcl] Unknown - High Severity    Sulfa Antibiotics Angioedema    Pregabalin Palpitations and Other (See Comments)       Past Social History:  Social History     Socioeconomic History    Marital status:    Tobacco Use    Smoking status: Passive Smoke Exposure - Never Smoker    Smokeless tobacco: Never    Tobacco comments:     Socially/rarely   Vaping Use    Vaping Use: Never used   Substance and Sexual Activity    Alcohol use: Yes     Comment: Social/Rarely    Drug use: Never    Sexual activity: Not Currently     Partners: Male     Birth control/protection: None       Past Family History:  Family History   Problem Relation Age of Onset    Lymphoma Mother     Depression Mother     Migraines Mother     Cancer Mother         Non-hodgkins lymphoma    " Hypertension Mother         Non-Hodkins Lymphoma/ at 47    Cancer Father         Lung & brain    Heart attack Father     Psoriasis Father     Alcohol abuse Father     Hypertension Father         Also had lung cancer metastasize to brain/ at 60    Aneurysm Brother     COPD Brother     Heart disease Brother     Heart attack Brother     Hypertension Brother         Hypertension, aneurysm near heart, 3 other aneurysms, dimentia    Migraines Son     Cancer Maternal Aunt     Cancer Maternal Uncle     Cancer Paternal Aunt     Heart disease Paternal Aunt     Cancer Paternal Uncle     Heart disease Paternal Uncle     Aneurysm Brother     Lung disease Brother     Alcohol abuse Brother     ADD / ADHD Son     Migraines Son        REVIEW OF SYSTEMS:   14 point ROS was performed and is negative except as outlined in HPI          Objective:   Temp:  [97.5 °F (36.4 °C)-98.2 °F (36.8 °C)] 97.7 °F (36.5 °C)  Heart Rate:  [59-92] 61  Resp:  [16-20] 18  BP: ()/(46-95) 116/64   No intake or output data in the 24 hours ending 10/25/23 133  Body mass index is 35.43 kg/m².      10/24/23  2029   Weight: 90.7 kg (200 lb)     Weight change:     General Appearance:    Alert, cooperative, in no acute distress, slightly sleepy this morning but arousable   Head:    Normocephalic, without obvious abnormality, atraumatic   Eyes:            Lids and lashes normal, conjunctivae and sclerae normal, no   icterus, no pallor, corneas clear, PERRLA   Ears:    Ears appear intact with no abnormalities noted   Throat:   No oral lesions, no thrush, oral mucosa moist   Neck:   No adenopathy, supple, trachea midline, no thyromegaly, no   carotid bruit, no JVD   Back:     No kyphosis present, no scoliosis present, no skin lesions, erythema or scars, no tenderness to percussion or palpation, range of motion normal   Lungs:     Clear to auscultation,respirations regular, even and unlabored    Heart:    Regular rhythm and normal rate, normal S1 and  S2, no murmur, no gallop, no rub, no click   Chest Wall:    No abnormalities observed   Abdomen:     Normal bowel sounds, no masses, no organomegaly, soft, nontender, nondistended, no guarding, no rebound  tenderness   Extremities:   Moves all extremities well, no edema, no cyanosis, no redness   Pulses:   Pulses palpable and equal bilaterally. Normal radial, carotid, femoral, dorsalis pedis and posterior tibial pulses bilaterally. Normal abdominal aorta   Skin:  Psychiatric:   No bleeding, bruising or rash    Alert and oriented x 3, normal mood and affect     Lab Review:   Results from last 7 days   Lab Units 10/25/23  0538 10/24/23  2055   SODIUM mmol/L 142 143   POTASSIUM mmol/L 3.9 3.9   CHLORIDE mmol/L 112* 107   CO2 mmol/L 21.0* 23.8   BUN mg/dL 10 9   CREATININE mg/dL 0.65 0.72   GLUCOSE mg/dL 93 104*   CALCIUM mg/dL 8.6 9.5   AST (SGOT) U/L  --  21   ALT (SGPT) U/L  --  21     Results from last 7 days   Lab Units 10/25/23  0538 10/25/23  0127 10/24/23  2055   HSTROP T ng/L <6 10* 10*     Results from last 7 days   Lab Units 10/25/23  0538 10/24/23  2055   WBC 10*3/mm3 10.53 16.15*   HEMOGLOBIN g/dL 13.9 15.3   HEMATOCRIT % 41.3 46.3   PLATELETS 10*3/mm3 221 269     Results from last 7 days   Lab Units 10/24/23  2055   INR  0.89*   APTT seconds 24.1     Results from last 7 days   Lab Units 10/25/23  0538   MAGNESIUM mg/dL 2.4     Results from last 7 days   Lab Units 10/25/23  0538   CHOLESTEROL mg/dL 278*   TRIGLYCERIDES mg/dL 191*   HDL CHOL mg/dL 73*               Echo EF Estimated  Lab Results   Component Value Date    ECHOEFEST 60 04/18/2017       EKG:                             I personally viewed and interpreted the patient's EKG    Imaging:  Imaging Results (Most Recent)       Procedure Component Value Units Date/Time    XR Chest 1 View [[id number]] Collected: 10/25/23 0013     Updated: 10/25/23 0020    Narrative:      EXAM: XR CHEST 1 VW-     INDICATION: Chest pain     COMPARISON: Radiographs dating  back to 1/14/2017          Impression:      Stable left lower lobe linear atelectasis/scarring. No focal  consolidation. No pleural effusion or pneumothorax. Normal size  cardiomediastinal silhouette. No focal osseous abnormality.           This report was finalized on 10/25/2023 12:17 AM by Dr. Claude Leiva M.D on Workstation: [id number]               Stress test myocardial perfusion imaging October 25, 2023:    Breast attenuation artifact is present.    Myocardial perfusion imaging indicates a normal myocardial perfusion study with no evidence of ischemia.    Left ventricular ejection fraction is hyperdynamic (Calculated EF > 70%).    Impressions are consistent with a low risk study.    Assessment:     Active Hospital Problems    Diagnosis  POA    **Chest pain [R07.9]  Yes      Resolved Hospital Problems   No resolved problems to display.          Plan:     Chest pain: Both atypical and typical features.  She has not had an ischemic evaluation in some time.  I reviewed her CT chest from earlier last year which did have some mild coronary calcifications.  Her troponins have been indeterminate.  Regarding this, we will proceed with pharmacologic stress test  Stress test were performed.  There is some breast attenuation artifact, but shows normal perfusion .  No evidence of ischemia.  In the setting of her musculoskeletal issues, this likely reflects chest pain of musculoskeletal origin.  No further cardiac testing needed.  Mild orthostatic hypotension with mild to moderate impairment of Autonomic reflexes.:  She can continue to follow-up with [place] [facility] for this.  She has been pretty thoroughly worked up and not much else I can offer for her at this point.  Thankfully, her symptoms generally seem positional orthostatic related.  Her headache, and her chest pain episodes do not seem to occur with changes in position or any lightheaded spells, and she is learning to live with her current issues  with her autonomic orthostatic hypotension.  I discussed the patient about low-dose midodrine therapy, I do not think she needs a right now, but we can follow-up with her in the next 2 to 4 weeks to discuss whether or not this will be needed.  In the meantime, can consider compression stockings and slow positional changes.  Maintain hydration.  Headache: Work-up and management per ER team.    Thank you for allowing me to participate in the care of Gabi Tomas.  Stress test was normal.  Chest pain is noncardiac in origin.  Cardiology will sign off at this time.  Feel free to contact me directly with any further questions or concerns.    Olvin Acuna MD  [facility]  10/25/23  06:24 EDT      Part of this note may be an electronic transcription/translation of spoken language to printed text using the Dragon Dictation System.

## 2023-10-25 NOTE — H&P
. [facility]   HISTORY AND PHYSICAL    Patient Name: Gabi Tomas  : 1960  MRN: [id number]  Primary Care Physician:  Carrillo Drake MD  Date of admission: 10/24/2023    Subjective   Subjective     Chief Complaint: Chest pain and headache    HPI:    Gabi Tomas is a 63 y.o. female with past medical history including but not limited to hyperlipidemia, hypothyroidism, fibromyalgia, depression, and arthritis presents to [facility] with headache and chest pain.  Patient reports headache for the past 5 days that she described as dull ache that mainly impact bilateral parietal region.  Report associated photophobia and phonophobia.  States history of migraine and had taken Toradol, Excedrin Migraine and Tylenol with no improvement.  Report yesterday she had sacroiliac joint injection by [facility] [place] pain clinic and also was a recovery she developed midsternal chest pain however states that she did not tell staff at that time.  Reports around 1800 this evening she developed midsternal chest pain that she described as sharp and stabbing.  Reports associated jaw pain.  Denies associated nausea, vomiting, diaphoresis, or back pain.  Symptoms improved by nothing, worsened by nothing.  Pain is nonpleuritic and nonexertional.  Denies abdominal pain, nausea, vomiting, diarrhea.  Denies cough, fever, chills, lower extremity edema.    Laboratory evaluation include high-sensitivity troponin 10, creatinine 0.72, D-dimer 0.34, INR 29, WBC 16.15 which is most likely secondary to the SI joint injection, hemoglobin 15.3 and platelets 269.  Chest x-ray shows no evidence of active disease and EKG nonischemic.    Recent echo on 2023 showed EF 61-65% with a grade 1 LV diastolic function and normal right ventricular cavity size and systolic function.    Review of Systems   All systems were reviewed and negative except for: That mentioned above in HPI    Personal History     Past Medical History:   Diagnosis  Date    Allergic     Anemia     Anxiety     Arthritis     Autonomic neuropathy     Cancer     cervical    Cervical disc disorder 2023    Cervical dysplasia     Conization in the past    Cervical mass     Chronic pain disorder 2013-present    Depression     Difficulty walking     Eating disorder     Environmental allergies     Extremity pain -present    Fibromyalgia, primary     Fractures 11-25-15    Lost bal & broke foot in 2 places    GERD (gastroesophageal reflux disease)     Headache     Hyperlipidemia     Hypothyroidism     Kidney stone     Low back pain     Migraine     Neck pain 2019-present    Obesity     Orthostatic hypertension     Osteoarthritis     Diagnosed by local rheum    Peptic ulceration     Peripheral neuropathy     Shingles     Sleep apnea     Syncope     Tremor     Urinary tract infection     Visual impairment     glasses       Past Surgical History:   Procedure Laterality Date    CERVICAL CONIZATION      CERVICAL EPIDURAL N/A 2023    Procedure: CERVICAL EPIDURAL STEROID INJECTION C7-T1 #1  00908;  Surgeon: Rosie Cuevas MD;  Location: [facility];  Service: Pain Management;  Laterality: N/A;     SECTION PRIMARY      CHOLECYSTECTOMY      DILATATION AND CURETTAGE      EPIDURAL BLOCK   &     HIATAL HERNIA REPAIR  2023    MEDIAL BRANCH BLOCK Bilateral 2022    Procedure: LUMBAR MEDIAL BRANCH BLOCK Bilateral ~L3-S1;  Surgeon: Rosie Cuevas MD;  Location: [facility];  Service: Pain Management;  Laterality: Bilateral;    MEDIAL BRANCH BLOCK Bilateral 2022    Procedure: LUMBAR MEDIAL BRANCH BLOCK BILATERAL L3-S1 #2;  Surgeon: Rosie Cuevas MD;  Location[facility];  Service: Pain Management;  Laterality: Bilateral;    NERVE SURGERY Left 2023    Procedure: LUMBAR RADIOFREQUENCY ABLATION LEFT L3-S1;  Surgeon: Rosie Cuevas MD;  [facility];  Service: Pain Management;  Laterality: Left;    NERVE SURGERY  "Right 02/15/2023    Procedure: LUMBAR RADIOFREQUENCY ABLATION RIGHT L3-S1;  Surgeon: Rosie Cuevas MD;  Location[facility];  Service: Pain Management;  Laterality: Right;    NISSEN FUNDOPLICATION LAPAROSCOPIC  04/19/2023    SACROILIAC JOINT INJECTION Left 10/23/2023    Procedure: SACROILIAC JOINT INJECTION LEFT 44018;  Surgeon: Rosie Cuevas MD;  Location: [facility];  Service: Pain Management;  Laterality: Left;       Family History: family history includes ADD / ADHD in her son; Alcohol abuse in her brother and father; Aneurysm in her brother and brother; COPD in her brother; Cancer in her father, maternal aunt, maternal uncle, mother, paternal aunt, and paternal uncle; Depression in her mother; Heart attack in her brother and father; Heart disease in her brother, paternal aunt, and paternal uncle; Hypertension in her brother, father, and mother; Lung disease in her brother; Lymphoma in her mother; Migraines in her mother, son, and son; Psoriasis in her father. Otherwise pertinent FHx was reviewed and not pertinent to current issue.    Social History:  reports that she is a non-smoker but has been exposed to tobacco smoke. She has never used smokeless tobacco. She reports current alcohol use. She reports that she does not use drugs.    Home Medications:  DULoxetine, clonazePAM, etodolac, fluticasone, gabapentin, ketorolac, lidocaine, montelukast, ondansetron ODT, vitamin B-12, and vitamin D    Allergies:  Allergies   Allergen Reactions    Ampicillin Anaphylaxis, Rash and Other (See Comments)    Cyclobenzaprine Other (See Comments)     \"made me sleep for 2 days\"    Levofloxacin Unknown - Low Severity    Milnacipran Other (See Comments)     Palpitations, rectal bleeding    Quinolones Other (See Comments)     Neurological issues     Savella [Milnacipran Hcl] Unknown - High Severity    Sulfa Antibiotics Angioedema    Pregabalin Palpitations and Other (See Comments)       Objective   Objective     Vitals: "   Temp:  [98.2 °F (36.8 °C)] 98.2 °F (36.8 °C)  Heart Rate:  [59-92] 62  Resp:  [16-20] 16  BP: (141-176)/(84-95) 155/90  Physical Exam    Constitutional: Awake, alert   Eyes: PERRLA, sclerae anicteric, no conjunctival injection   HENT: NCAT, mucous membranes moist   Neck: Supple, no thyromegaly, no lymphadenopathy, trachea midline   Respiratory: Clear to auscultation bilaterally, nonlabored respirations    Cardiovascular: RRR, no murmurs, rubs, or gallops, palpable pedal pulses bilaterally   Gastrointestinal: Positive bowel sounds, soft, nontender, nondistended   Musculoskeletal: No bilateral ankle edema, no clubbing or cyanosis to extremities   Psychiatric: Appropriate affect, cooperative   Neurologic: Oriented x 3, strength symmetric in all extremities, Cranial Nerves grossly intact to confrontation, speech clear   Skin: No rashes     Result Review    Result Review:  I have personally reviewed the results from the time of this admission to 10/25/2023 00:05 EDT and agree with these findings:  []  Laboratory list / accordion  []  Microbiology  []  Radiology  []  EKG/Telemetry   []  Cardiology/Vascular   []  Pathology  []  Old records  []  Other:      Assessment & Plan   Assessment / Plan     Brief Patient Summary:  Gabi Tomas is a 63 y.o. female who was seen eval in the ED for chest pain and headache    Active Hospital Problems:  Active Hospital Problems    Diagnosis     **Chest pain      Plan:     Chest pain  -Initial troponin 10, trend  -EKG nonischemic  -Cardiology consult  -Cardiac monitoring  -N.p.o.      Intractable headache  -Received Benadryl and Compazine  -IVF    DVT prophylaxis:  Mechanical DVT prophylaxis orders are present.    CODE STATUS:    Level Of Support Discussed With: Patient  Code Status (Patient has no pulse and is not breathing): CPR (Attempt to Resuscitate)  Medical Interventions (Patient has pulse or is breathing): Full Support    Admission Status:  I believe this patient meets  observation status.    78 minutes has been spent by [facility] providers in the care of this patient while under observation status    .During patient visit, I utilized appropriate personal protective equipment including gloves. Appropriate PPE was worn during the entire visit.  Hand hygiene was completed before and after    Electronically signed by STEFANY Andrade, 10/25/23, 12:05 AM EDT.

## 2023-10-26 ENCOUNTER — READMISSION MANAGEMENT (OUTPATIENT)
Dept: CALL CENTER | Facility: HOSPITAL | Age: 63
End: 2023-10-26
Payer: MEDICARE

## 2023-10-26 NOTE — OUTREACH NOTE
Prep Survey      Flowsheet Row Responses   [facility] patient discharged from? [place]   Is LACE score < 7 ? No   Eligibility Readm Mgmt   Discharge diagnosis chest pain, s/p stress  test, mild orthostatic hypotension   Does the patient have one of the following disease processes/diagnoses(primary or secondary)? Other   Does the patient have Home health ordered? No   Is there a DME ordered? No   Prep survey completed? Yes            Marguerite HARRISON - Registered Nurse

## 2023-11-10 ENCOUNTER — READMISSION MANAGEMENT (OUTPATIENT)
Dept: CALL CENTER | Facility: HOSPITAL | Age: 63
End: 2023-11-10
Payer: MEDICARE

## 2023-11-10 NOTE — OUTREACH NOTE
Medical Week 3 Survey      Flowsheet Row Responses   [facility] patient discharged from? [place]   Does the patient have one of the following disease processes/diagnoses(primary or secondary)? Other   Week 3 attempt successful? No   Unsuccessful attempts Attempt 1            Brenna SELLERS - Registered Nurse

## 2023-11-14 ENCOUNTER — READMISSION MANAGEMENT (OUTPATIENT)
Dept: CALL CENTER | Facility: HOSPITAL | Age: 63
End: 2023-11-14
Payer: MEDICARE

## 2023-11-14 NOTE — OUTREACH NOTE
"Medical Week 3 Survey      Flowsheet Row Responses   [facility] patient discharged from? [place]   Does the patient have one of the following disease processes/diagnoses(primary or secondary)? Other   Week 3 attempt successful? Yes   Call start time 1648   Call end time 1655   Discharge diagnosis chest pain, s/p stress  test, mild orthostatic hypotension   Is the patient taking all medications as directed (includes completed medication regime)? Yes   Does the patient have a primary care provider?  Yes   Has the patient kept scheduled appointments due by today? N/A   Comments States she had to cancel appt on 10/31 due to she couldn't get to appt.  She has appt next week.  Advised to call in the am to see if they have any cancelation where she can been seen sooner.   Psychosocial issues? No   Did the patient receive a copy of their discharge instructions? Yes   Nursing interventions Reviewed instructions with patient   What is the patient's perception of their health status since discharge? Improving   Is the patient/caregiver able to teach back signs and symptoms related to disease process for when to call PCP? Yes   Is the patient/caregiver able to teach back signs and symptoms related to disease process for when to call 911? Yes   Is the patient/caregiver able to teach back the hierarchy of who to call/visit for symptoms/problems? PCP, Specialist, Home health nurse, Urgent Care, ED, 911 Yes   Additional teach back comments States she has had a migraine every day.  She is taking excedrin migraine for it.  She has appt with PCP next week and cardiology on 11/28.  Some chest pains at time. States bp has been \"ok\".  If symptoms worsen she will go to ED.  No n/v.   Week 3 Call Completed? Yes   Graduated Yes   Graduated/Revoked comments Pt to contact PCP regarding continued migraines.  She will see if sooner appt is available.  If symptoms worsen, she will go to ED.   Call end time 1655            Dorie P - " Licensed Nurse

## 2023-11-28 ENCOUNTER — OFFICE VISIT (OUTPATIENT)
Dept: CARDIOLOGY | Facility: CLINIC | Age: 63
End: 2023-11-28
Payer: MEDICARE

## 2023-11-28 VITALS
RESPIRATION RATE: 28 BRPM | BODY MASS INDEX: 35.44 KG/M2 | OXYGEN SATURATION: 98 % | WEIGHT: 200 LBS | SYSTOLIC BLOOD PRESSURE: 122 MMHG | HEART RATE: 83 BPM | HEIGHT: 63 IN | DIASTOLIC BLOOD PRESSURE: 79 MMHG

## 2023-11-28 DIAGNOSIS — G90.9 AUTONOMIC NEUROPATHY: ICD-10-CM

## 2023-11-28 DIAGNOSIS — I95.1 ORTHOSTATIC HYPOTENSION: ICD-10-CM

## 2023-11-28 DIAGNOSIS — G47.33 OBSTRUCTIVE SLEEP APNEA: ICD-10-CM

## 2023-11-28 DIAGNOSIS — R07.2 PRECORDIAL PAIN: Primary | ICD-10-CM

## 2023-11-28 PROBLEM — N94.89 OTHER SPECIFIED CONDITIONS ASSOCIATED WITH FEMALE GENITAL ORGANS AND MENSTRUAL CYCLE: Status: RESOLVED | Noted: 2019-03-28 | Resolved: 2023-11-28

## 2023-11-28 PROBLEM — R55 NEAR SYNCOPE: Status: RESOLVED | Noted: 2023-07-21 | Resolved: 2023-11-28

## 2023-11-28 PROBLEM — R10.11 ABDOMINAL WALL PAIN IN RIGHT UPPER QUADRANT: Status: RESOLVED | Noted: 2022-06-10 | Resolved: 2023-11-28

## 2023-11-28 PROBLEM — Z86.16 PERSONAL HISTORY OF COVID-19: Status: RESOLVED | Noted: 2022-02-07 | Resolved: 2023-11-28

## 2023-11-28 PROBLEM — Z87.898 H/O URINARY INCONTINENCE: Status: RESOLVED | Noted: 2021-08-17 | Resolved: 2023-11-28

## 2023-11-28 PROBLEM — R68.89 INFLUENZA-LIKE SYMPTOMS: Status: RESOLVED | Noted: 2018-02-01 | Resolved: 2023-11-28

## 2023-11-28 PROBLEM — Z20.828 CONTACT WITH AND (SUSPECTED) EXPOSURE TO OTHER VIRAL COMMUNICABLE DISEASES: Status: RESOLVED | Noted: 2020-08-17 | Resolved: 2023-11-28

## 2023-11-28 PROBLEM — K59.00 CONSTIPATION: Status: RESOLVED | Noted: 2017-01-10 | Resolved: 2023-11-28

## 2023-11-28 PROBLEM — G20.A1 PARKINSON'S DISEASE: Status: RESOLVED | Noted: 2022-02-07 | Resolved: 2023-11-28

## 2023-11-28 PROBLEM — U07.1 COVID-19: Status: RESOLVED | Noted: 2021-08-17 | Resolved: 2023-11-28

## 2023-11-28 PROBLEM — R42 DIZZINESS: Status: RESOLVED | Noted: 2018-03-23 | Resolved: 2023-11-28

## 2023-11-28 PROBLEM — Z87.442 HISTORY OF RENAL CALCULI: Status: RESOLVED | Noted: 2019-10-18 | Resolved: 2023-11-28

## 2023-11-28 PROCEDURE — 1159F MED LIST DOCD IN RCRD: CPT | Performed by: PHYSICIAN ASSISTANT

## 2023-11-28 PROCEDURE — 1160F RVW MEDS BY RX/DR IN RCRD: CPT | Performed by: PHYSICIAN ASSISTANT

## 2023-11-28 PROCEDURE — 99214 OFFICE O/P EST MOD 30 MIN: CPT | Performed by: PHYSICIAN ASSISTANT

## 2023-11-28 RX ORDER — FLUDROCORTISONE ACETATE 0.1 MG/1
0.1 TABLET ORAL DAILY
Qty: 30 TABLET | Refills: 1 | Status: SHIPPED | OUTPATIENT
Start: 2023-11-28

## 2023-11-28 RX ORDER — TRAMADOL HYDROCHLORIDE 50 MG/1
50 TABLET ORAL EVERY 6 HOURS PRN
COMMUNITY

## 2023-11-28 RX ORDER — TIZANIDINE 4 MG/1
4 TABLET ORAL NIGHTLY PRN
COMMUNITY

## 2023-11-28 RX ORDER — ROSUVASTATIN CALCIUM 20 MG/1
20 TABLET, COATED ORAL DAILY
COMMUNITY

## 2023-11-28 NOTE — PROGRESS NOTES
CARDIOLOGY    Date of Office Visit: 2023  Patient Name: Gabi Tomas  : 1960  Encounter Provider: Roman Boyd PA-C  Primary Cardiologist: Olvin Acuna MD    CHIEF COMPLAINT / REASON FOR OFFICE VISIT     Hospital Follow Up Visit      HISTORY OF PRESENT ILLNESS     This is a 63 y.o. year old female who presents to [facility] CARDIOLOGY for a for  follow up from their recent inpatient hospitalization.    She has previously followed up with the [facility] and [facility] dysautonomic clinic.  She has also had thermoregulatory sweat test but cannot tolerate it for greater than 29 minutes.  She has had a history of mild orthostatic hypotension and impairment of autonomic reflexes.    We initially met the hospital in 2023 when she presented with lightheadedness and tingling in both of her hands.  Cardiology was consulted to evaluate her syncope and it was felt the symptoms were from autonomic neuropathy.  This had acutely worsened after his Cymbalta had been increased to 90 mg in the morning and 60 in the evening.  An echocardiogram was completed that showed 9 normal ejection fraction and she was discharged home with a Holter monitor.  Holter showed no acute abnormalities.    She went to the hospital on 10/25/2023 with complaint of headache and some mild chest pressure.  She had had a stress test prior to this and the results were consistent with a low risk study.  Her orthostatics were positive at the time and she was given some fluids.  She follows chronically long-term with the [place] autonomic clinic.  Dr. Acuna had seen the patient in the ER and recommended possibly evaluating midodrine at her follow-up visit in 1 month.    Today the patient reports she has been having increasing profound fatigue over the past month.  She also notes she is having worsening short-term memory to the point where she feels like she may need to start living at home.   "She has persistent dizziness with positional changes and this morning had an episode of almost near syncope where the dizziness did not let up for about 2 hours.  Due to how much she has been sleeping she has been having poor oral fluid intake.  She has been wearing compression stockings 24 hours of the day.    We had a long discussion about autonomic dysfunction and its end of metrics.  She has not been started on any medications in the past.  We discussed possibly trying midodrine and Florinef.  They elected to try Florinef first and I will follow-up with her in 2 weeks to evaluate her symptoms.  If no improvement will try midodrine.      PMHx: Autonomic dysfunction, fibromyalgia, cervical radiculopathy, migraines, obstructive sleep apnea, nephrolithiasis, Sjogren's syndrome, osteoarthritis    PHYSICAL EXAMINATION     Vital Signs:  /79 (BP Location: Right arm, Patient Position: Sitting, Cuff Size: Adult)   Pulse 83   Resp 28   Ht 160 cm (63\")   Wt 90.7 kg (200 lb)   SpO2 98%   BMI 35.43 kg/m²   Estimated body mass index is 35.43 kg/m² as calculated from the following:    Height as of this encounter: 160 cm (63\").    Weight as of this encounter: 90.7 kg (200 lb).             Physical Exam  Constitutional:       Appearance: Normal appearance.   HENT:      Head: Normocephalic and atraumatic.   Cardiovascular:      Rate and Rhythm: Normal rate and regular rhythm.      Pulses: Normal pulses.      Heart sounds: Normal heart sounds.   Pulmonary:      Effort: Pulmonary effort is normal.      Breath sounds: Normal breath sounds.   Musculoskeletal:      Right lower leg: No edema.      Left lower leg: No edema.   Skin:     General: Skin is warm and dry.   Neurological:      General: No focal deficit present.      Mental Status: She is alert and oriented to person, place, and time.          Cardiac Testing/Results     Cardiac Testing:   - Echo 7/22/2023: EF 61 to 65% with grade 1 diastolic dysfunction.  Normal " valvular heart function    -Holter monitor 8/14/2023: Average heart rate 78 bpm with a range of 58 212 bpm.  No significant arrhythmia noted.    -Stress Test 10/25/2023: Attenuation artifact present.  Normal myocardial perfusion.  EF 70%.    Result Review :  The following data was reviewed by: Roman Boyd PA-C on 11/28/2023:    Lipid Panel          10/25/2023    05:38   Lipid Panel   Total Cholesterol 278    Triglycerides 191    HDL Cholesterol 73    VLDL Cholesterol 35    LDL Cholesterol  170    LDL/HDL Ratio 2.28       Lab Results   Component Value Date     10/25/2023     10/24/2023    K 3.9 10/25/2023    K 3.9 10/24/2023     (H) 10/25/2023     10/24/2023    CO2 21.0 (L) 10/25/2023    CO2 23.8 10/24/2023    BUN 10 10/25/2023    BUN 9 10/24/2023    CREATININE 0.65 10/25/2023    CREATININE 0.72 10/24/2023    EGFRIFNONA 66 04/26/2019    EGFRIFNONA 78 04/13/2018    EGFRIFAFRI >60 08/10/2021    EGFRIFAFRI >60 08/07/2021    GLUCOSE 93 10/25/2023    GLUCOSE 104 (H) 10/24/2023    CALCIUM 8.6 10/25/2023    CALCIUM 9.5 10/24/2023    ALBUMIN 4.6 10/24/2023    ALBUMIN 4.2 07/21/2023    AST 21 10/24/2023    AST 26 07/21/2023    ALT 21 10/24/2023    ALT 27 07/21/2023     Lab Results   Component Value Date    WBC 10.53 10/25/2023    WBC 16.15 (H) 10/24/2023    HGB 13.9 10/25/2023    HGB 15.3 10/24/2023    HCT 41.3 10/25/2023    HCT 46.3 10/24/2023    MCV 91.2 10/25/2023    MCV 90.4 10/24/2023     10/25/2023     10/24/2023     Lab Results   Component Value Date    PROBNP 61.5 12/22/2022    BNP <10.0 08/10/2021    BNP <10.0 08/07/2021     Lab Results   Component Value Date    TROPONINI <0.010 08/07/2021    TROPONINT <6 10/25/2023     Lab Results   Component Value Date    TSH 3.580 07/21/2023    TSH 4.200 06/13/2017                          ASSESSMENT & PLAN       Diagnoses and all orders for this visit:    1. Precordial pain (Primary)  She had a Lexiscan stress test completed showing  no acute ischemic changes.  Dr. Acuna had reviewed prior CT of the chest showing mild coronary artery calcifications  Continue primary risk prevention of coronary artery disease including blood pressure and lipid management.  LDL most recent labs were 170.  Her ASCVD risk score is 3.6% with an optimal goal of 2.6%.  This still places her under the current American Heart Association guidelines to start someone on a statin and the risk factors are over 5%.  2. Autonomic neuropathy  Recently followed at [facility] for ongoing care  Ongoing symptoms.  Dizziness with positional changes with near syncope  Increase fluid intake  Start fludrocortisone 0.1 mcg daily to help increase intravascular volume  Start drinking electrolytes  Wear compression stockings during the daytime  I will check in with her in 2 weeks to see if symptoms improved at all.  If no improvement will start midodrine.  At that point we will reevaluate she stays on the medication and see if I need to get lab work done to check her electrolytes.  3. Obstructive sleep apnea  Encourage compliance with CPAP  4. Orthostatic hypotension  Benefits from baseline systolic blood pressure in the 120s to 140s to prevent symptoms.  Increase fluid intake  Utilize compression stockings      Follow Up:  Return in about 6 months (around 5/28/2024) for -Routine.  Patient was given instructions and counseling regarding her condition or for health maintenance advice. Please contact office if worsening symptoms or proceed to ER when appropriate.      Roman Boyd PA-C  11/28/23  11:39 EST    MEDICATIONS         Discharge Medications            Accurate as of November 28, 2023 11:39 AM. If you have any questions, ask your nurse or doctor.                Changes to Medications        Instructions Start Date   clonazePAM 0.5 MG tablet  Commonly known as: KlonoPIN  What changed: how much to take   0.5 mg, Oral, Every 12 Hours Scheduled              Continue These Medications        Instructions Start Date   DULoxetine 60 MG capsule  Commonly known as: CYMBALTA   1 capsule, Oral, 2 Times Daily      etodolac 400 MG tablet  Commonly known as: LODINE   400 mg, Oral, 2 Times Daily PRN      fluticasone 50 MCG/ACT nasal spray  Commonly known as: FLONASE   2 sprays, Daily      gabapentin 800 MG tablet  Commonly known as: NEURONTIN   800 mg, Oral, 2 Times Daily, Pt reports she only takes am and PM dose;      ketorolac 10 MG tablet  Commonly known as: TORADOL   Take 1 tab PO every 6 hours as needed for pain.      lidocaine 4 % cream  Commonly known as: LMX   1 application , Topical, 4 Times Daily PRN      montelukast 10 MG tablet  Commonly known as: SINGULAIR   10 mg, Oral      ondansetron ODT 4 MG disintegrating tablet  Commonly known as: ZOFRAN-ODT   As Needed      rosuvastatin 20 MG tablet  Commonly known as: CRESTOR   20 mg, Oral, Daily      tiZANidine 4 MG tablet  Commonly known as: ZANAFLEX   4 mg, Oral, Nightly PRN      traMADol 50 MG tablet  Commonly known as: ULTRAM   50 mg, Oral, Every 6 Hours PRN      vitamin B-12 1000 MCG tablet  Commonly known as: CYANOCOBALAMIN   TAKE 1 TABLET BY MOUTH EVERY DAY      vitamin D 1.25 MG (53495 UT) capsule capsule  Commonly known as: ERGOCALCIFEROL   50,000 Units, Oral, Weekly, Mondays                   **Naomi Disclaimer: This note was dictated using an electronic transcription. The electronic translation of spoken language may permit erroneous, or at times, nonsensical words or phrases to be inadvertently transcribed. Although I have reviewed the note for such errors, some may still exist.

## 2023-12-12 ENCOUNTER — TELEPHONE (OUTPATIENT)
Dept: CARDIOLOGY | Facility: CLINIC | Age: 63
End: 2023-12-12
Payer: MEDICARE

## 2023-12-12 DIAGNOSIS — I95.1 ORTHOSTATIC HYPOTENSION: Primary | ICD-10-CM

## 2023-12-12 NOTE — TELEPHONE ENCOUNTER
----- Message from Roman Calero PA-C sent at 11/28/2023 12:14 PM EST -----  Regarding: autonomic dysfunction  · Start fludrocortisone 0.1 mcg daily to help increase intravascular volume  · Start drinking electrolytes  · Wear compression stockings during the daytime  · I will check in with her in 2 weeks to see if symptoms improved at all.  If no improvement will start midodrine.  At that point we will reevaluate she stays on the medication and see if I need to get lab work done to check her electrolytes.

## 2023-12-12 NOTE — TELEPHONE ENCOUNTER
She has stopped her gabapentin due to not having enough medication.  She did this 1 week ago and has gone through mild withdrawals.    She had an EGD/colonoscopy on December 1, 2023 and had 10 days of diarrhea after the fact.  She has been not feeling well overall.    Blood pressure at home has been controlled into the 120s over 80s.  She has been taking Florinef and increased it to 2 tablets a day 2 days ago.    I recommended her getting blood work done at her primary care's office as it is closer to her home in the form of a magnesium, BMP and CBC to check her electrolytes and renal function.    I will send a note to her primary care provider that she is still having confusion, migraine headaches and an overall decline in quality of life in the last 2 to 3 months for them to evaluate further.    I will follow-up with her after blood work and in 1 week for blood pressure checks to determine if we might need to start midodrine for POTS symptoms.

## 2023-12-15 ENCOUNTER — LAB (OUTPATIENT)
Dept: LAB | Facility: HOSPITAL | Age: 63
End: 2023-12-15
Payer: MEDICARE

## 2023-12-15 DIAGNOSIS — I95.1 ORTHOSTATIC HYPOTENSION: ICD-10-CM

## 2023-12-15 LAB
ANION GAP SERPL CALCULATED.3IONS-SCNC: 12.6 MMOL/L (ref 5–15)
BUN SERPL-MCNC: 9 MG/DL (ref 8–23)
BUN/CREAT SERPL: 10.1 (ref 7–25)
CALCIUM SPEC-SCNC: 9.7 MG/DL (ref 8.6–10.5)
CHLORIDE SERPL-SCNC: 106 MMOL/L (ref 98–107)
CO2 SERPL-SCNC: 20.4 MMOL/L (ref 22–29)
CREAT SERPL-MCNC: 0.89 MG/DL (ref 0.57–1)
DEPRECATED RDW RBC AUTO: 41.3 FL (ref 37–54)
EGFRCR SERPLBLD CKD-EPI 2021: 73.9 ML/MIN/1.73
ERYTHROCYTE [DISTWIDTH] IN BLOOD BY AUTOMATED COUNT: 13.1 % (ref 12.3–15.4)
GLUCOSE SERPL-MCNC: 87 MG/DL (ref 65–99)
HCT VFR BLD AUTO: 46.6 % (ref 34–46.6)
HGB BLD-MCNC: 15.6 G/DL (ref 12–15.9)
MAGNESIUM SERPL-MCNC: 2.3 MG/DL (ref 1.6–2.4)
MCH RBC QN AUTO: 28.9 PG (ref 26.6–33)
MCHC RBC AUTO-ENTMCNC: 33.5 G/DL (ref 31.5–35.7)
MCV RBC AUTO: 86.5 FL (ref 79–97)
PLATELET # BLD AUTO: 326 10*3/MM3 (ref 140–450)
PMV BLD AUTO: 11.1 FL (ref 6–12)
POTASSIUM SERPL-SCNC: 3.4 MMOL/L (ref 3.5–5.2)
RBC # BLD AUTO: 5.39 10*6/MM3 (ref 3.77–5.28)
SODIUM SERPL-SCNC: 139 MMOL/L (ref 136–145)
WBC NRBC COR # BLD AUTO: 6.14 10*3/MM3 (ref 3.4–10.8)

## 2023-12-15 PROCEDURE — 80048 BASIC METABOLIC PNL TOTAL CA: CPT

## 2023-12-15 PROCEDURE — 83735 ASSAY OF MAGNESIUM: CPT

## 2023-12-15 PROCEDURE — 36415 COLL VENOUS BLD VENIPUNCTURE: CPT

## 2023-12-15 PROCEDURE — 85027 COMPLETE CBC AUTOMATED: CPT

## 2023-12-15 NOTE — PROGRESS NOTES
I called patient to go over results.  She is starting to feel better.  She has been drinking her electrolytes but still having intermittent diarrhea and not able to eat food.  I will plan to recheck her potassium levels in a week prior to putting this on the fludrocortisone as she has been having severe GI upset.  Follows up with GI on Monday.

## 2023-12-19 ENCOUNTER — TELEPHONE (OUTPATIENT)
Dept: CARDIOLOGY | Facility: CLINIC | Age: 63
End: 2023-12-19
Payer: MEDICARE

## 2023-12-19 RX ORDER — FLUDROCORTISONE ACETATE 0.1 MG/1
0.2 TABLET ORAL DAILY
Qty: 60 TABLET | Refills: 1 | Status: SHIPPED | OUTPATIENT
Start: 2023-12-19

## 2023-12-19 NOTE — TELEPHONE ENCOUNTER
Seems like her symptoms are doing mildly better since starting the fludrocortisone.  Will continue with her current dose of taking it once per day.  She is following up with GI after an abnormal gastric emptying study yesterday.

## 2024-02-20 RX ORDER — FLUDROCORTISONE ACETATE 0.1 MG/1
0.2 TABLET ORAL DAILY
Qty: 60 TABLET | Refills: 1 | Status: SHIPPED | OUTPATIENT
Start: 2024-02-20

## 2024-05-06 ENCOUNTER — TELEPHONE (OUTPATIENT)
Dept: CARDIOLOGY | Facility: CLINIC | Age: 64
End: 2024-05-06
Payer: MEDICARE

## 2024-05-20 RX ORDER — FLUDROCORTISONE ACETATE 0.1 MG/1
TABLET ORAL DAILY
Qty: 30 TABLET | Refills: 1 | Status: SHIPPED | OUTPATIENT
Start: 2024-05-20

## 2024-06-07 ENCOUNTER — OFFICE VISIT (OUTPATIENT)
Dept: CARDIOLOGY | Facility: CLINIC | Age: 64
End: 2024-06-07
Payer: MEDICARE

## 2024-06-07 VITALS
BODY MASS INDEX: 31.89 KG/M2 | SYSTOLIC BLOOD PRESSURE: 118 MMHG | WEIGHT: 180 LBS | HEART RATE: 75 BPM | HEIGHT: 63 IN | OXYGEN SATURATION: 97 % | DIASTOLIC BLOOD PRESSURE: 84 MMHG

## 2024-06-07 DIAGNOSIS — I95.1 ORTHOSTATIC HYPOTENSION: Primary | ICD-10-CM

## 2024-06-07 DIAGNOSIS — G47.33 OSA (OBSTRUCTIVE SLEEP APNEA): ICD-10-CM

## 2024-06-07 PROCEDURE — 1160F RVW MEDS BY RX/DR IN RCRD: CPT | Performed by: NURSE PRACTITIONER

## 2024-06-07 PROCEDURE — 99214 OFFICE O/P EST MOD 30 MIN: CPT | Performed by: NURSE PRACTITIONER

## 2024-06-07 PROCEDURE — 93000 ELECTROCARDIOGRAM COMPLETE: CPT | Performed by: NURSE PRACTITIONER

## 2024-06-07 PROCEDURE — 1159F MED LIST DOCD IN RCRD: CPT | Performed by: NURSE PRACTITIONER

## 2024-06-07 RX ORDER — AMLODIPINE BESYLATE 2.5 MG/1
2.5 TABLET ORAL DAILY
COMMUNITY

## 2024-06-07 RX ORDER — FLUDROCORTISONE ACETATE 0.1 MG/1
0.2 TABLET ORAL DAILY
Qty: 60 TABLET | Refills: 3 | Status: SHIPPED | OUTPATIENT
Start: 2024-06-07

## 2024-06-07 RX ORDER — SUMATRIPTAN 50 MG/1
50 TABLET, FILM COATED ORAL
COMMUNITY

## 2024-06-07 NOTE — PROGRESS NOTES
[facility] Follow Up Office Note     Encounter Date:24  Patient:Gabi Tomas  :1960  MRN:[id number]      Chief Complaint: No chief complaint on file.        History of Presenting Illness:        Gabi Tomas is a 64 y.o. female who is here for follow-up. She is a patient of Dr Acuna.    Patient has past medical history significant for autonomic dysfunction, fibromyalgia, cervical radiculopathy, migraines, CRISTIAN, nephro lithiasis, Sjogren's syndrome, osteoarthritis.    Patient previously followed at the [facility], [place], operated by [facility] for dysautonomia.    Patient was evaluated by Dr. Acuna in 2023 when she presented with lightheadedness and tingling in both hands.  Symptoms felt to be related to autonomic neuropathy.  Echo at this time was normal.  Holter at discharge returned with no abnormalities.  Since this time she also had a stress test 10/2023 consistent with low risk study.    Patient saw Roman in 2023 at which time she was having multiple symptoms including profound fatigue.  Short-term memory problems, worsened positional dizziness as well as persistent dizziness, poor intake.  She was wearing compression consistently.  At this visit she was started on Florinef.    Several weeks later Roman checked on patient with noted improvement in symptoms.    Patient is here today for follow-up.  She reports her symptoms are fairly well-controlled.  She did started on amlodipine by her primary care recently after having elevated blood pressure following a procedure for her back.  She has occasional very short-lived chest pains.  She does not tolerate much activity and finds herself fairly fatigued most of the time.  She still has dizziness with position changes but no syncope.      Review of Systems:  Review of Systems   Constitutional: Positive for malaise/fatigue.   Cardiovascular:  Positive for dyspnea on exertion. Negative for chest pain, leg swelling, orthopnea  and palpitations.   Respiratory:  Negative for shortness of breath.    Neurological:  Positive for light-headedness.       Current Outpatient Medications on File Prior to Visit   Medication Sig Dispense Refill    amLODIPine (NORVASC) 2.5 MG tablet Take 1 tablet by mouth Daily.      DULoxetine (CYMBALTA) 60 MG capsule Take 1 capsule by mouth 2 (Two) Times a Day.      lidocaine (LMX) 4 % cream Apply 1 application  topically to the appropriate area as directed 4 (Four) Times a Day As Needed for Mild Pain. 15 g 3    montelukast (SINGULAIR) 10 MG tablet Take 1 tablet by mouth.      ondansetron ODT (ZOFRAN-ODT) 4 MG disintegrating tablet As Needed.      SUMAtriptan (IMITREX) 50 MG tablet Take 1 tablet by mouth Every 2 (Two) Hours As Needed for Migraine. Take one tablet at onset of headache. May repeat dose one time in 2 hours if headache not relieved.      vitamin D (ERGOCALCIFEROL) 1.25 MG (97185 UT) capsule capsule Take 1 capsule by mouth 1 (One) Time Per Week. Mondays      [DISCONTINUED] fludrocortisone 0.1 MG tablet TAKE 1 TABLET BY MOUTH DAILY 30 tablet 1    clonazePAM (KlonoPIN) 0.5 MG tablet Take 1 tablet by mouth Every 12 (Twelve) Hours for 14 days. (Patient taking differently: Take 0.5 tablets by mouth Every 12 (Twelve) Hours.) 28 tablet 0    [DISCONTINUED] etodolac (LODINE) 400 MG tablet Take 1 tablet by mouth 2 (Two) Times a Day As Needed (pain). 60 tablet 0    [DISCONTINUED] gabapentin (NEURONTIN) 800 MG tablet Take 1 tablet by mouth 2 (Two) Times a Day. Pt reports she only takes am and PM dose; 60 tablet 0    [DISCONTINUED] ketorolac (TORADOL) 10 MG tablet Take 1 tab PO every 6 hours as needed for pain. 12 tablet 0    [DISCONTINUED] tiZANidine (ZANAFLEX) 4 MG tablet Take 1 tablet by mouth At Night As Needed for Muscle Spasms.      [DISCONTINUED] traMADol (ULTRAM) 50 MG tablet Take 1 tablet by mouth Every 6 (Six) Hours As Needed for Moderate Pain.      [DISCONTINUED] vitamin B-12 (CYANOCOBALAMIN) 1000 MCG  "tablet TAKE 1 TABLET BY MOUTH EVERY DAY 30 tablet 5     No current facility-administered medications on file prior to visit.       Allergies   Allergen Reactions    Ampicillin Anaphylaxis, Rash and Other (See Comments)    Cyclobenzaprine Other (See Comments)     \"made me sleep for 2 days\"    Levofloxacin Unknown - Low Severity    Milnacipran Other (See Comments)     Palpitations, rectal bleeding    Quinolones Other (See Comments)     Neurological issues     Savella [Milnacipran Hcl] Unknown - High Severity    Sulfa Antibiotics Angioedema    Pregabalin Palpitations and Other (See Comments)       Past Medical History:   Diagnosis Date    Allergic     Anemia     Anxiety     Arthritis     Autonomic neuropathy     Cancer     cervical    Cervical disc disorder 2023    Cervical dysplasia     Conization in the past    Cervical mass     Chronic pain disorder -present    Chronic pain syndrome 2023    Depression     Difficulty walking     Eating disorder     Environmental allergies     Extremity pain -present    Fibromyalgia, primary     Fractures 11-25-15    Lost bal & broke foot in 2 places    GERD (gastroesophageal reflux disease)     Headache     Hyperlipidemia     Hypothyroidism     Kidney stone     Low back pain     Migraine     Neck pain 2019-present    Obesity     Orthostatic hypertension     Osteoarthritis     Diagnosed by local rheum    Peptic ulceration     Peripheral neuropathy     Shingles     Sleep apnea     Syncope     Tremor     Urinary tract infection     Visual impairment     glasses       Past Surgical History:   Procedure Laterality Date    CERVICAL CONIZATION      CERVICAL EPIDURAL N/A 2023    Procedure: CERVICAL EPIDURAL STEROID INJECTION C7-T1 #1  65397;  Surgeon: Rosie Cuevas MD;  Location: [facility] MAIN OR;  Service: Pain Management;  Laterality: N/A;     SECTION PRIMARY      CHOLECYSTECTOMY      DILATATION AND CURETTAGE      EPIDURAL BLOCK   &  "    HIATAL HERNIA REPAIR  2023    MEDIAL BRANCH BLOCK Bilateral 2022    Procedure: LUMBAR MEDIAL BRANCH BLOCK Bilateral ~L3-S1;  Surgeon: Rosie Cuevas MD;  Location: [facility];  Service: Pain Management;  Laterality: Bilateral;    MEDIAL BRANCH BLOCK Bilateral 2022    Procedure: LUMBAR MEDIAL BRANCH BLOCK BILATERAL L3-S1 #2;  Surgeon: Rosie Cuevas MD;  Location: [facility];  Service: Pain Management;  Laterality: Bilateral;    NERVE SURGERY Left 2023    Procedure: LUMBAR RADIOFREQUENCY ABLATION LEFT L3-S1;  Surgeon: Rosie Cuevas MD;  Location: [facility];  Service: Pain Management;  Laterality: Left;    NERVE SURGERY Right 02/15/2023    Procedure: LUMBAR RADIOFREQUENCY ABLATION RIGHT L3-S1;  Surgeon: Rosie Cuevas MD;  Location: [facility];  Service: Pain Management;  Laterality: Right;    NISSEN FUNDOPLICATION LAPAROSCOPIC  2023    SACROILIAC JOINT INJECTION Left 10/23/2023    Procedure: SACROILIAC JOINT INJECTION LEFT 55252;  Surgeon: Rosie Cuevas MD;  Location: [facility];  Service: Pain Management;  Laterality: Left;       Social History     Socioeconomic History    Marital status:    Tobacco Use    Smoking status: Every Day     Current packs/day: 0.00     Types: Cigarettes     Passive exposure: Yes    Smokeless tobacco: Never    Tobacco comments:     Socially/rarely   Vaping Use    Vaping status: Never Used   Substance and Sexual Activity    Alcohol use: Yes     Comment: Social/Rarely    Drug use: Never    Sexual activity: Not Currently     Partners: Male     Birth control/protection: None       Family History   Problem Relation Age of Onset    Lymphoma Mother     Depression Mother     Migraines Mother     Cancer Mother         Non-hodgkins lymphoma    Hypertension Mother         Non-Hodkins Lymphoma/ at 47    Cancer Father         Lung & brain    Heart attack Father     Psoriasis Father     Alcohol abuse Father     Hypertension Father   "       Also had lung cancer metastasize to brain/ at 60    Aneurysm Brother     COPD Brother     Heart disease Brother     Heart attack Brother     Hypertension Brother         Hypertension, aneurysm near heart, 3 other aneurysms, dimentia    Migraines Son     Cancer Maternal Aunt     Cancer Maternal Uncle     Cancer Paternal Aunt     Heart disease Paternal Aunt     Cancer Paternal Uncle     Heart disease Paternal Uncle     Aneurysm Brother     Lung disease Brother     Alcohol abuse Brother     ADD / ADHD Son     Migraines Son        The following portions of the patient's history were reviewed and updated as appropriate: allergies, current medications, past family history, past medical history, past social history, past surgical history and problem list.       Objective:       Vitals:    24 1333   BP: 118/84   Pulse: 75   SpO2: 97%   Weight: 81.6 kg (180 lb)   Height: 160 cm (63\")         Physical Exam:  Constitutional: Well appearing, well developed, no acute distress   HENT: Oropharynx clear and membrane moist  Eyes: Normal conjunctiva, no sclera icterus  Neck: Supple, no carotid bruit bilaterally  Cardiovascular: Regular rate and rhythm, No Murmur, No bilateral lower extremity edema  Pulmonary: Normal respiratory effort, normal lung sounds, no wheezing  Neurological: Alert and orient x 3  Skin: Warm, dry, no ecchymosis, no rash  Psych: Appropriate mood and affect. Normal judgment and insight         Lab Results   Component Value Date     12/15/2023     10/25/2023    K 3.4 (L) 12/15/2023    K 3.9 10/25/2023     12/15/2023     (H) 10/25/2023    CO2 20.4 (L) 12/15/2023    CO2 21.0 (L) 10/25/2023    BUN 9 12/15/2023    BUN 10 10/25/2023    CREATININE 0.89 12/15/2023    CREATININE 0.65 10/25/2023    EGFRIFNONA 66 2019    EGFRIFNONA 78 2018    EGFRIFAFRI >60 08/10/2021    EGFRIFAFRI >60 2021    GLUCOSE 87 12/15/2023    GLUCOSE 93 10/25/2023    CALCIUM 9.7 12/15/2023 "    CALCIUM 8.6 10/25/2023    PROTENTOTREF 6.3 06/13/2017    ALBUMIN 4.6 10/24/2023    ALBUMIN 4.2 07/21/2023    BILITOT 0.3 10/24/2023    BILITOT 0.5 07/21/2023    AST 21 10/24/2023    AST 26 07/21/2023    ALT 21 10/24/2023    ALT 27 07/21/2023     Lab Results   Component Value Date    WBC 6.14 12/15/2023    WBC 10.53 10/25/2023    HGB 15.6 12/15/2023    HGB 13.9 10/25/2023    HCT 46.6 12/15/2023    HCT 41.3 10/25/2023    MCV 86.5 12/15/2023    MCV 91.2 10/25/2023     12/15/2023     10/25/2023     Lab Results   Component Value Date    CHOL 278 (H) 10/25/2023    TRIG 191 (H) 10/25/2023    TRIG 148 06/13/2017    HDL 73 (H) 10/25/2023    HDL 72 06/13/2017     (H) 10/25/2023     (H) 06/13/2017     Lab Results   Component Value Date    PROBNP 61.5 12/22/2022    BNP <10.0 08/10/2021    BNP <10.0 08/07/2021     Lab Results   Component Value Date    TROPONINI <0.010 08/07/2021    TROPONINT <6 10/25/2023     Lab Results   Component Value Date    TSH 3.580 07/21/2023    TSH 4.200 06/13/2017           ECG 12 Lead    Date/Time: 6/7/2024 1:51 PM  Performed by: Myesha Grimm APRN    Authorized by: Myesha Grimm APRN  Comparison: compared with previous ECG from 10/25/2023  Similar to previous ECG  Rhythm: sinus rhythm  Rate: normal  BPM: 71  ST Segments: ST segments normal             Assessment:          Diagnosis Plan   1. Orthostatic hypotension  ECG 12 Lead      2. CRISTIAN (obstructive sleep apnea)  Ambulatory Referral to Sleep Medicine             Plan:       Autonomic dysfunction/orthostatic hypotension -allow permissive hypertension to prevent symptoms.  Increase fluid intake and use compression stockings.  She is not on Florinef 0.1 mcg which she takes twice daily.  I recommended trying 2 pills in the morning instead of twice daily dosing but ultimately she can do whichever her symptoms are best controlled with.  She is on amlodipine, unclear reasons for this but patient says it is  related to an elevated blood pressure episode that was isolated.  I would recommend stopping this medication    CRISTIAN -previously was not able to find CPAP mask that works for her.  This has been several years and her physician has since retired.  I placed a new referral to sleep medicine    Patient is seen today for follow-up.  I think her symptoms are probably the best controlled that they can be.  I will look into stopping amlodipine as it decreases blood pressure and we are trying to raise her average blood pressure slightly to avoid symptomatic episodes    I have recommended trying to increase activity level little bit, try recumbent activity or aquatics    Follow-up with Dr. Acuna in 6 months    Orders Placed This Encounter   Procedures    Ambulatory Referral to Sleep Medicine     Referral Priority:   Routine     Referral Type:   Consultation     Referred to Provider:   Eliezer Agudelo MD     Number of Visits Requested:   1    ECG 12 Lead     This order was created via procedure documentation     Order Specific Question:   Release to patient     Answer:   Routine Release [[id number]]            STEFANY Jaimes  [facility]  06/07/24  14:22 EDT

## 2024-08-29 ENCOUNTER — TELEPHONE (OUTPATIENT)
Dept: CARDIOLOGY | Facility: CLINIC | Age: 64
End: 2024-08-29
Payer: MEDICARE

## 2024-08-29 NOTE — TELEPHONE ENCOUNTER
Clearance form has been completed.  Patient is low risk for perioperative MACE.  She has had perfusion stress testing within the last year that was nonischemic.

## 2024-08-29 NOTE — TELEPHONE ENCOUNTER
Caller: Gabi Tomas    Relationship to patient: Self    Best call back number:  [phone]    Patient is needing: PATIENT HAS CARDIAC CLEARANCE IN MEDIA IN EPIC TO BE COMPLETED FOR PATIENT TO HAVE SURGERY ON HER STOMACH. SHE IS REQUESTING A CALL BACK ONCE THAT HAS BEEN COMPLETED.

## 2024-09-03 ENCOUNTER — TELEPHONE (OUTPATIENT)
Dept: CARDIOLOGY | Facility: CLINIC | Age: 64
End: 2024-09-03

## 2024-09-03 NOTE — TELEPHONE ENCOUNTER
REQUEST FOR CARDIAC CLEARANCE    Caller name: Gabi Tomas     Phone Number: [phone] OPT 1      Where should we fax the clearance to? [phone]    [facility] SURGICAL DR RUSHING CLEARANCE STILL NOT RECEIVED. PLEASE ADVISE.

## 2024-09-05 NOTE — TELEPHONE ENCOUNTER
THE PATIENT CALLED IN STATING THAT THE SURGICAL OFFICE STILL HAS NOT RECEIVED THIS CLEARANCE.  THE PATIENT WOULD LIKE HER  TO COME PICK THIS UP FOR HER.  PLEASE CALL THE PATIENT BACK TO DISCUSS  PH: [phone]

## 2024-11-04 ENCOUNTER — TRANSCRIBE ORDERS (OUTPATIENT)
Dept: ADMINISTRATIVE | Facility: HOSPITAL | Age: 64
End: 2024-11-04
Payer: MEDICARE

## 2024-11-04 DIAGNOSIS — Z12.31 ENCOUNTER FOR SCREENING MAMMOGRAM FOR MALIGNANT NEOPLASM OF BREAST: Primary | ICD-10-CM

## 2024-12-09 ENCOUNTER — OFFICE VISIT (OUTPATIENT)
Dept: CARDIOLOGY | Facility: CLINIC | Age: 64
End: 2024-12-09
Payer: MEDICARE

## 2024-12-09 VITALS
SYSTOLIC BLOOD PRESSURE: 130 MMHG | BODY MASS INDEX: 35.08 KG/M2 | HEIGHT: 63 IN | HEART RATE: 78 BPM | DIASTOLIC BLOOD PRESSURE: 82 MMHG | WEIGHT: 198 LBS

## 2024-12-09 DIAGNOSIS — I95.1 AUTONOMIC ORTHOSTATIC HYPOTENSION: Primary | ICD-10-CM

## 2024-12-09 DIAGNOSIS — R93.1 ELEVATED CORONARY ARTERY CALCIUM SCORE: ICD-10-CM

## 2024-12-09 DIAGNOSIS — E78.00 HIGH CHOLESTEROL: ICD-10-CM

## 2024-12-09 DIAGNOSIS — E78.5 DYSLIPIDEMIA: ICD-10-CM

## 2024-12-09 RX ORDER — ATORVASTATIN CALCIUM 20 MG/1
20 TABLET, FILM COATED ORAL NIGHTLY
Qty: 30 TABLET | Refills: 11 | Status: SHIPPED | OUTPATIENT
Start: 2024-12-09 | End: 2025-12-09

## 2024-12-09 RX ORDER — LACTULOSE 10 G/15ML
SOLUTION ORAL
COMMUNITY
Start: 2024-08-30

## 2024-12-09 RX ORDER — FLUTICASONE PROPIONATE 50 MCG
2 SPRAY, SUSPENSION (ML) NASAL DAILY
COMMUNITY
Start: 2022-09-01 | End: 2025-10-28

## 2024-12-09 RX ORDER — RIFAXIMIN 550 MG/1
550 TABLET ORAL EVERY 8 HOURS SCHEDULED
COMMUNITY
Start: 2024-12-06

## 2024-12-09 RX ORDER — HYDROCODONE BITARTRATE AND ACETAMINOPHEN 7.5; 3 MG/1; MG/1
TABLET ORAL
COMMUNITY

## 2024-12-09 RX ORDER — LEVOCETIRIZINE DIHYDROCHLORIDE 5 MG/1
5 TABLET, FILM COATED ORAL EVERY EVENING
COMMUNITY
Start: 2024-10-23 | End: 2025-10-28

## 2024-12-09 RX ORDER — FLUDROCORTISONE ACETATE 0.1 MG/1
0.1 TABLET ORAL 2 TIMES DAILY
Qty: 180 TABLET | Refills: 3 | Status: SHIPPED | OUTPATIENT
Start: 2024-12-09 | End: 2025-12-09

## 2024-12-09 NOTE — PROGRESS NOTES
[facility]    Subjective:     Encounter Date:12/09/24      Patient ID: Gabi Tomas is a 64 y.o. female.    Chief Complaint: No chief complaint on file.  Follow-up for orthostatic hypotension  History of Present Illness    Gabi Tomas is a 64 y.o.  female  with a history of autonomic dysfunction, fibromyalgia, cervical radiculopathy, immobility, migraines, neuropathy, obstructive sleep apnea, and  kidney stones. An ECHO in 4/2018 at  [facility] showed EF 66% and was otherwise normal.      In 2020, there was concern she had Parkinson since disease and was referred to [facility] movement disorder specialist; at that time it was felt she did not have Parkinson's but had dystonia.  Neuro cardio autonomic reflex testing was done at [facility] which was consistent with significant cardiovagal and cardiovascular adrenergic abnormalities.  A QSART study was also done which favored preganglionic/central autonomic disorder.  She was evaluated at the [facility] dysautonomia clinic.  She also had a thermoregulatory sweat test but cannot tolerate it for greater than 29 minutes.  Evaluation at [facility] in December 2021 showed mild orthostatic hypotension with mild to moderate impairment of the autonomic reflexes.     An ECHO in 7/22 showed  EF 61 to 65%, grade 1 LV diastolic dysfunction, and was otherwise unremarkable.      Pt presented to ER on 7/21/23 with lightheadedness and tingling in both hands and feeling like she was going to pass out with episodes of confusion. In ER, Creatinine 0.79, potassium 4.1, WBC 5.95, hemoglobin 15.0.  Chest ray was negative. We were consulted for syncope. It was felt her autonomic neuropathy symptoms got worse after her Cymbalta was increased to 90 mg in the morning and 60 in the evening.  This was switched to 60 mg twice a day. An ECHO showed normal LVSF and EF of 61-65%. During this admission she complained of vague chest pain and EKG  and troponin were negative. It was felt pt was very anxious and t most of her symptoms were somatic symptoms. She was started on low dose clonazepam twice a day and felt much better. A Holter was placed at discharge. A Holter on 8/14/23 was normal.       Pt presented to ER on 10/24/23 with complaints of headache and chest pain. She reported he has had a headache for the past 5 days that he described as a dull ache in bilateral parietal region with associated photophobia and phonophobia.  Subsequent myocardial perfusion stress test during that hospitalization was normal.    She presents today for follow-up of her orthostatic symptoms.  She reports they are stable.  She sometimes wears compression stockings that she bought from [employer] but she does not have any prescription strength stockings.    She does not have any further chest pain.  She is intolerant to rosuvastatin as she states it causes palpitations.  She has not been on atorvastatin from what I can tell since 2019 and she was on pravastatin in 2016..    Her LDL has been in the 180s to 190s.    Previous Cardiac Testing:  Stress test Myocard perfusion October 2023:  Breast attenuation artifact is present.    Myocardial perfusion imaging indicates a normal myocardial perfusion study with no evidence of ischemia.    Left ventricular ejection fraction is hyperdynamic (Calculated EF > 70%).    Impressions are consistent with a low risk study.t    Echocardiogram July 2023:    Left ventricular systolic function is normal. Left ventricular ejection fraction appears to be 61 - 65%.    Left ventricular diastolic function is consistent with (grade I) impaired relaxation.    Normal right ventricular cavity size and systolic function noted.    Insufficient TR velocity profile to estimate the right ventricular systolic pressure.    There is no evidence of pericardial effusion.    Holter monitor 24-hour August 2023:  A normal monitor study.    Underlying heart rhythm was  sinus rhythm with an average heart rate of 78 bpm and a heart rate range of 58 bpm up to 112 bpm    Patient triggered event correlated with underlying sinus rhythm with no significant arrhythmia noted during study    The following portions of the patient's history were reviewed and updated as appropriate: allergies, current medications, past family history, past medical history, past social history, past surgical history and problem list.    Past Medical History:   Diagnosis Date    Allergic     Anemia     Anxiety     Arthritis     Autonomic neuropathy     Cancer     cervical    Cervical disc disorder 2023    Cervical dysplasia     Conization in the past    Cervical mass     Chronic pain disorder -present    Chronic pain syndrome 2023    Depression     Difficulty walking     Eating disorder     Environmental allergies     Extremity pain -present    Fibromyalgia, primary     Fractures 11-25-15    Lost bal & broke foot in 2 places    GERD (gastroesophageal reflux disease)     Headache     Hyperlipidemia     Hypothyroidism     Kidney stone     Low back pain     Migraine     Neck pain 2019-present    Obesity     Orthostatic hypertension     Osteoarthritis     Diagnosed by local rheum    Peptic ulceration     Peripheral neuropathy     Shingles     Sleep apnea     Syncope     Tremor     Urinary tract infection     Visual impairment     glasses       Past Surgical History:   Procedure Laterality Date    CERVICAL CONIZATION      CERVICAL EPIDURAL N/A 2023    Procedure: CERVICAL EPIDURAL STEROID INJECTION C7-T1 #1  59142;  Surgeon: Rosie Cuevas MD;  Location: [facility] MAIN OR;  Service: Pain Management;  Laterality: N/A;     SECTION PRIMARY      CHOLECYSTECTOMY      DILATATION AND CURETTAGE      EPIDURAL BLOCK   &     HIATAL HERNIA REPAIR  2023    MEDIAL BRANCH BLOCK Bilateral 2022    Procedure: LUMBAR MEDIAL BRANCH BLOCK Bilateral ~L3-S1;  Surgeon: Faith  "Rosie BACON MD;  Location: [facility];  Service: Pain Management;  Laterality: Bilateral;    MEDIAL BRANCH BLOCK Bilateral 12/16/2022    Procedure: LUMBAR MEDIAL BRANCH BLOCK BILATERAL L3-S1 #2;  Surgeon: Rosie Cuevas MD;  Location: [facility];  Service: Pain Management;  Laterality: Bilateral;    NERVE SURGERY Left 02/01/2023    Procedure: LUMBAR RADIOFREQUENCY ABLATION LEFT L3-S1;  Surgeon: Rosie Cuevas MD;  Location: [facility];  Service: Pain Management;  Laterality: Left;    NERVE SURGERY Right 02/15/2023    Procedure: LUMBAR RADIOFREQUENCY ABLATION RIGHT L3-S1;  Surgeon: Rosie Cuevas MD;  Location: [facility];  Service: Pain Management;  Laterality: Right;    NISSEN FUNDOPLICATION LAPAROSCOPIC  04/19/2023    SACROILIAC JOINT INJECTION Left 10/23/2023    Procedure: SACROILIAC JOINT INJECTION LEFT 23701;  Surgeon: Rosie Cuevas MD;  Location: [facility];  Service: Pain Management;  Laterality: Left;           ECG 12 Lead    Date/Time: 12/9/2024 1:47 PM  Performed by: Olvin Acuna MD    Authorized by: Olvin Acuna MD  Comparison: compared with previous ECG from 6/7/2024  Similar to previous ECG  Rhythm: sinus rhythm  Rate: normal  Conduction: conduction normal  ST Segments: ST segments normal  T Waves: T waves normal  QRS axis: normal  Other: no other findings    Clinical impression: normal ECG  Comments: Borderline atrial enlargement otherwise normal ECG.             Objective:     Vitals:    12/09/24 1323   BP: 130/82   Pulse: 78   Weight: 89.8 kg (198 lb)   Height: 160 cm (63\")         Constitutional:       Appearance: Not in distress. Frail.   Neck:      Vascular: JVD normal.   Pulmonary:      Effort: Pulmonary effort is normal.      Breath sounds: Normal breath sounds.   Cardiovascular:      PMI at left midclavicular line. Normal rate. Regular rhythm. Normal S2.       Murmurs: There is no murmur.      Comments: Ambulates with roller walker.  Pulses:     Intact distal pulses. "   Edema:     Peripheral edema absent.   Skin:     General: Skin is warm and dry.   Neurological:      General: No focal deficit present.      Mental Status: Alert, oriented to person, place, and time and oriented to person, place and time.   Psychiatric:         Mood and Affect: Mood and affect normal.         Lab Review:     Lipid Panel          10/28/2024    17:36   Lipid Panel   Total Cholesterol 323       HDL Cholesterol 103       LDL Cholesterol  179.6          Details          This result is from an external source.             BUN   Date Value Ref Range Status   12/15/2023 9 8 - 23 mg/dL Final   12/10/2021 14 8 - 26 mg/dL Final   08/10/2021 10 10 - 20 mg/dL Final     Creatinine   Date Value Ref Range Status   12/15/2023 0.89 0.57 - 1.00 mg/dL Final   12/10/2021 0.79 0.57 - 1.11 mg/dL Final   08/10/2021 0.58 0.55 - 1.02 mg/dL Final     Potassium   Date Value Ref Range Status   12/15/2023 3.4 (L) 3.5 - 5.2 mmol/L Final   12/10/2021 4.4 3.3 - 4.8 mmol/L Final     Comment:     Plasma reference ranges are shown, please note that serum reference ranges are higher than plasma.   08/10/2021 4.6 3.5 - 5.1 mmol/L Final     ALT (SGPT)   Date Value Ref Range Status   10/24/2023 21 1 - 33 U/L Final   08/10/2021 9 0 - 55 U/L Final     AST (SGOT)   Date Value Ref Range Status   10/24/2023 21 1 - 32 U/L Final   08/10/2021 33 5 - 34 U/L Final         Performed        Assessment:          Diagnosis Plan   1. Autonomic orthostatic hypotension  fludrocortisone 0.1 MG tablet    Compression Stockings      2. High cholesterol        3. Dyslipidemia               Plan:         Autonomic orthostatic hypotension.:  She has been evaluated with the [facility] as well as leaving clinic for this.  Cardiac testing has not revealed a primary cardiovascular origin.  Symptoms are managed on Florinef 0.1 twice daily  She reports she has not been drinking up electrolyte beverages recently.  Encouraged her to increase this.  She wears  compression stockings intermittently but they are not prescription strength.  Will give Rx for thigh-high compression stockings and encouraged her to wear them.  History of atypical chest pain: Stress perfusion study October 2023 was normal  Coronary arterial calcifications.  Mild on previous CT imaging.  Due to hyperlipidemia.  She warrants statin therapy for 2 reasons, she has had an LDL of greater than 200 on 1 occasion, and has been consistently in the 180 range since that time.  She was intolerant to rosuvastatin she thinks due to palpitations.  I do wonder if it was just due to her orthostasis, but regardless we will trial a different statin  Start atorvastatin 20, increase to 40 if tolerated  She was on pravastatin in the past and did not tolerate that either.  She may ultimately need Leqvio versus Repatha if she cannot tolerate the atorvastatin.  We did discuss statin side effects, and cardiovascular issues of palpitations and chest pain are likely not due to the cholesterol medication.  We discussed myalgia side effects.  Hyper lipidemia: Most recent .  With the coronary artery calcium she requires treatment per above.      Thank you for allowing me to participate in the care of Gabi Tomas. Feel free to contact me directly with any further questions or concerns.    RTC 6 months for reassessment.    Olvin Acuna MD  [facility]  12/09/24  13:23 EST       Current Outpatient Medications:     clonazePAM (KlonoPIN) 0.5 MG tablet, Take 1 tablet by mouth Every 12 (Twelve) Hours for 14 days. (Patient taking differently: Take 0.5 tablets by mouth Every 12 (Twelve) Hours.), Disp: 28 tablet, Rfl: 0    DULoxetine (CYMBALTA) 60 MG capsule, Take 1 capsule by mouth 2 (Two) Times a Day., Disp: , Rfl:     fludrocortisone 0.1 MG tablet, Take 1 tablet by mouth 2 (Two) Times a Day., Disp: 180 tablet, Rfl: 3    fluticasone (FLONASE) 50 MCG/ACT nasal spray, Administer 2 sprays into the nostril(s) as  directed by provider Daily., Disp: , Rfl:     HYDROcodone-Acetaminophen (XODOL) 7.5-300 MG per tablet, Take 1 tablet 3 times a day by oral route as needed., Disp: , Rfl:     lactulose (CHRONULAC) 10 GM/15ML solution, TAKE 15 ML BY MOUTH EVERY DAY FOR CONSTIPATION, Disp: , Rfl:     levocetirizine (XYZAL) 5 MG tablet, Take 1 tablet by mouth Every Evening., Disp: , Rfl:     lidocaine (LMX) 4 % cream, Apply 1 application  topically to the appropriate area as directed 4 (Four) Times a Day As Needed for Mild Pain., Disp: 15 g, Rfl: 3    montelukast (SINGULAIR) 10 MG tablet, Take 1 tablet by mouth., Disp: , Rfl:     ondansetron ODT (ZOFRAN-ODT) 4 MG disintegrating tablet, As Needed., Disp: , Rfl:     SUMAtriptan (IMITREX) 50 MG tablet, Take 1 tablet by mouth Every 2 (Two) Hours As Needed for Migraine. Take one tablet at onset of headache. May repeat dose one time in 2 hours if headache not relieved., Disp: , Rfl:     vitamin D (ERGOCALCIFEROL) 1.25 MG (60774 UT) capsule capsule, Take 1 capsule by mouth 1 (One) Time Per Week. Mondays, Disp: , Rfl:     Xifaxan 550 MG tablet, Take 1 tablet by mouth Every 8 (Eight) Hours., Disp: , Rfl:     atorvastatin (LIPITOR) 20 MG tablet, Take 1 tablet by mouth Every Night., Disp: 30 tablet, Rfl: 11         Return in about 6 months (around 6/9/2025).      Part of this note may be an electronic transcription/translation of spoken language to printed text using the Dragon Dictation System.

## 2024-12-09 NOTE — PATIENT INSTRUCTIONS
Continue the fludrocortisone medication.  Take it as you are doing.    I would recommend you try to increase electrolyte intake with the liquid IV PACs.  This is one of the mainstays of treatment we have to treat your orthostatic hypotension    I would recommend you try to get fitted for prescription strength compression stockings and get fitted at a medical supply store.  This can also help your symptoms    Start taking the atorvastatin medication.  I think will do fine on this.  This should not cause any palpitations or any cardiac side effects but it is important to stay on this to prevent any future cardiovascular events.

## 2025-02-11 ENCOUNTER — TELEPHONE (OUTPATIENT)
Dept: CARDIOLOGY | Facility: CLINIC | Age: 65
End: 2025-02-11
Payer: MEDICARE

## 2025-02-11 NOTE — TELEPHONE ENCOUNTER
2/11/25          Request for Cardiac Clearance:      I have received a request from [facility] Dr. Edilberto Vargas office stating that they need clearance for the patient who is scheduled to have an placement of gastric stimulator system. They fax number is [phone]      Please advise.

## 2025-03-04 ENCOUNTER — OFFICE VISIT (OUTPATIENT)
Dept: PAIN MEDICINE | Facility: CLINIC | Age: 65
End: 2025-03-04
Payer: MEDICARE

## 2025-03-04 VITALS
RESPIRATION RATE: 20 BRPM | SYSTOLIC BLOOD PRESSURE: 138 MMHG | DIASTOLIC BLOOD PRESSURE: 85 MMHG | BODY MASS INDEX: 36.61 KG/M2 | TEMPERATURE: 96.7 F | HEIGHT: 63 IN | OXYGEN SATURATION: 97 % | WEIGHT: 206.6 LBS | HEART RATE: 86 BPM

## 2025-03-04 DIAGNOSIS — M47.816 LUMBAR SPONDYLOSIS: ICD-10-CM

## 2025-03-04 DIAGNOSIS — M46.1 SACROILIITIS: Primary | ICD-10-CM

## 2025-03-04 PROCEDURE — 1159F MED LIST DOCD IN RCRD: CPT | Performed by: PHYSICIAN ASSISTANT

## 2025-03-04 PROCEDURE — 1160F RVW MEDS BY RX/DR IN RCRD: CPT | Performed by: PHYSICIAN ASSISTANT

## 2025-03-04 PROCEDURE — 99214 OFFICE O/P EST MOD 30 MIN: CPT | Performed by: PHYSICIAN ASSISTANT

## 2025-03-04 PROCEDURE — 1125F AMNT PAIN NOTED PAIN PRSNT: CPT | Performed by: PHYSICIAN ASSISTANT

## 2025-03-04 NOTE — PROGRESS NOTES
CHIEF COMPLAINT  SACROILIAC JOINT INJECTION LEFT   Pt reports 80% pain relief x 1 year; just now getting some return of sciatca pain--alternating between each side .    Subjective   Gabi Tomas is a 65 y.o. female  who presents to the office for follow-up of procedure.  She completed a left sacroiliac joint injection   on 10/23/2023 performed by Dr. Cuevas for management of low back/sciatica pain. Patient reports 80% relief from the procedure x 1 year.  Patient states that she is just now beginning to experience some recrudescence of left-sided low back/sciatica pain radiating to the left posterior buttock and upper thigh.  Primary pain complaint on today is she is having a significant flare of the fibromyalgia pain which is causing widespread pain.  Patient also has complaints of posterior cervical spine pain of which she has responded extremely well in the past to JUANITA.    Since her last office visit patient was diagnosed with gastroparesis and is scheduled to have a gastric stimulator implanted on 3/27/2025 with Dr. Edilberto Vargas.  She is also scheduled to undergo hernia repair on 3/6/2025 with Dr. Adams An.    Pain today 8/10 VAS in severity.        Back Pain  This is a new problem. The current episode started 1 to 4 weeks ago. The problem occurs constantly. The problem has been gradually worsening since onset. The pain is present in the sacro-iliac and lumbar spine. The quality of the pain is described as shooting, stabbing and burning. The pain radiates to the left thigh (LLE). The pain is at a severity of 8/10. The pain is moderate. The pain is The same all the time. The symptoms are aggravated by sitting, position and lying down. Associated symptoms include weakness. Pertinent negatives include no chest pain or numbness.   Fibromyalgia  Symptoms are chronic.   Symptoms occur constantly.   Symptoms have been worse since onset.   Symptoms include weakness.    Pertinent negative symptoms include no chest  "pain, no cough, no fatigue and no numbness.        PEG Assessment   What number best describes your pain on average in the past week?10  What number best describes how, during the past week, pain has interfered with your enjoyment of life?10  What number best describes how, during the past week, pain has interfered with your general activity?  10    Review of Pertinent Medical Data ---  No new imaging for review on today    The following portions of the patient's history were reviewed and updated as appropriate: allergies, current medications, past family history, past medical history, past social history, past surgical history, and problem list.    Review of Systems   Constitutional:  Negative for activity change (less) and fatigue.   Respiratory:  Negative for cough and chest tightness.    Cardiovascular:  Negative for chest pain.   Musculoskeletal:  Positive for back pain.   Neurological:  Positive for dizziness, weakness and light-headedness. Negative for numbness.   Psychiatric/Behavioral:  Positive for agitation and sleep disturbance. Negative for suicidal ideas. The patient is not nervous/anxious.      I have reviewed and confirmed the accuracy of the ROS as documented by the MA/LPN/RN INDER [place]   Vitals:    03/04/25 1108   BP: 138/85   BP Location: Right arm   Patient Position: Sitting   Cuff Size: Adult   Pulse: 86   Resp: 20   Temp: 96.7 °F (35.9 °C)   TempSrc: Temporal   SpO2: 97%   Weight: 93.7 kg (206 lb 9.6 oz)   Height: 160 cm (63\")   PainSc: 8          Objective   Physical Exam  Vitals and nursing note reviewed.   Constitutional:       Appearance: Normal appearance.   HENT:      Head: Normocephalic.   Neurological:      Mental Status: She is alert and oriented to person, place, and time.      Cranial Nerves: Cranial nerves 2-12 are intact.      Sensory: Sensation is intact.      Motor: Motor function is intact.      Gait: Gait abnormal.   Psychiatric:         Mood and Affect: Mood normal.    "      Behavior: Behavior normal.         Thought Content: Thought content normal.         Judgment: Judgment normal.         PHQ-2 Depression Screening  Little interest or pleasure in doing things? Almost all   Feeling down, depressed, or hopeless? Almost all   PHQ-2 Total Score 6     PHQ-9 Depression Screening  Little interest or pleasure in doing things? Almost all   Feeling down, depressed, or hopeless? Almost all   PHQ-2 Total Score 6   Trouble falling or staying asleep, or sleeping too much? Almost all   Feeling tired or having little energy? Almost all   Poor appetite or overeating? Almost all   Feeling bad about yourself - or that you are a failure or have let yourself or your family down? Not at all   Trouble concentrating on things, such as reading the newspaper or watching television? Almost all   Moving or speaking so slowly that other people could have noticed? Or the opposite - being so fidgety or restless that you have been moving around a lot more than usual? Almost all   Thoughts that you would be better off dead, or of hurting yourself in some way? Not at all   PHQ-9 Total Score 21   If you checked off any problems, how difficult have these problems made it for you to do your work, take care of things at home, or get along with other people? Extremely difficult     Patient screened positive for depression based on a PHQ-9 score of 21 on 3/4/2025. Follow-up recommendations include: PCP managing depression and Prescribed antidepressant medication treatment.    Tobacco Use: High Risk (3/4/2025)    Patient History     Smoking Tobacco Use: Every Day     Smokeless Tobacco Use: Never     Passive Exposure: Yes     Gabi Tomas  reports that she has been smoking cigarettes. She has been exposed to tobacco smoke. She has never used smokeless tobacco. I have educated her on the risk of diseases from using tobacco products such as cancer, COPD, heart disease, and increase of chronic pain .     I advised her to  quit and she is not willing to quit at this time.    I spent  1  minutes counseling the patient.          Assessment & Plan   Diagnoses and all orders for this visit:    1. Sacroiliitis (Primary)  -     Ambulatory Referral to Pain Management    2. Lumbar spondylosis  -     Ambulatory Referral to Pain Management        Gabi Tomas reports a pain score of 8.  Given her pain assessment as noted, treatment options were discussed and the following options were decided upon as a follow-up plan to address the patient's pain: continuation of current treatment plan for pain, use of non-medical modalities (ice, heat, stretching and/or behavior modifications), and referral to [facility] .      --- This patient has responded extremely well to interventional pain management procedures which have consisted of cervical epidural steroid injections, radiofrequency ablation of the bilateral L3-S1 medial branch nerves, as well as left sacroiliac joint injection both performed under fluoroscopy guidance.  Fortunately due to the distance of our practice from the patient's home she is seeking referral to alternative pain management practice which I am happy to provide her with.  Since the patient lives in the [facility] area she is closer to [facility] which does have a location on [facility] and a referral has been placed.  --- Due to the patient's flare of pain on today I would have considered prescribing a Medrol Dosepak as she has responded well to steroids in the past however with to upcoming outpatient procedures I did not feel that would be in her best interest to have any exposure to steroid at this time.  The patient indicates understanding.                Dictated utilizing Dragon dictation.

## 2025-07-17 ENCOUNTER — TELEPHONE (OUTPATIENT)
Dept: CARDIOLOGY | Age: 65
End: 2025-07-17

## 2025-07-18 ENCOUNTER — TELEPHONE (OUTPATIENT)
Dept: CARDIOLOGY | Age: 65
End: 2025-07-18

## 2025-07-21 ENCOUNTER — OFFICE VISIT (OUTPATIENT)
Age: 65
End: 2025-07-21
Payer: MEDICARE

## 2025-07-21 VITALS
SYSTOLIC BLOOD PRESSURE: 130 MMHG | HEART RATE: 82 BPM | DIASTOLIC BLOOD PRESSURE: 74 MMHG | BODY MASS INDEX: 37.74 KG/M2 | WEIGHT: 213 LBS | HEIGHT: 63 IN

## 2025-07-21 DIAGNOSIS — E78.5 DYSLIPIDEMIA: Primary | ICD-10-CM

## 2025-07-21 DIAGNOSIS — Z87.898 HISTORY OF PALPITATIONS: ICD-10-CM

## 2025-07-21 DIAGNOSIS — E78.00 HIGH CHOLESTEROL: ICD-10-CM

## 2025-07-21 RX ORDER — MIDODRINE HYDROCHLORIDE 2.5 MG/1
2.5 TABLET ORAL 2 TIMES DAILY
Qty: 60 TABLET | Refills: 3 | Status: SHIPPED | OUTPATIENT
Start: 2025-07-21

## 2025-07-21 NOTE — PROGRESS NOTES
Subjective:     Encounter Date:07/21/2025      Patient ID: Gabi Tomas is a 65 y.o. female.    Chief Complaint: Follow-up autonomic dysfunction  History of Present Illness  This is a 65-year-old female who follows with Dr. Acuna and is new to me today.  She has a past medical history of autonomic dysfunction, fibromyalgia, cervical radiculopathy, immobility, migraines, neuropathy, obstructive sleep apnea and kidney stones.    She is here today for follow-up visit.  She has been having issues with her heart racing at night.  She has also been having an worsening lightheadedness with position changes.  She says throughout the day she feels like she is going to pass out.  She had an episode of right-sided chest pain last night and felt this was probably related to her gastroparesis.  It lasted about 5 minutes.  She says she has had shortness of breath for about 6 months.  She does smoke but not every day and not a lot when she does smoke.  She says she smokes about 1 to 2 cigarettes a week.  She spends a lot of time in bed.  She has been trying to drink 5 packs of liquid IV daily.  She says that she is unable to eat a lot of food due to her gastroparesis.  She has not been wearing compression socks at home.  She says that she was not able to obtain the prescription stockings that were sent to [facility] because they stated they never received the order.  She is needing to undergo surgery on her hernia and is requesting cardiac clearance for this.    Prior history:  In 2020 there was concern that she had Parkinson's disease and she was referred to [facility] movement disorder specialist.  At that time it was felt that she did not have Parkinson's but had dystonia.    Neuro cardio autonomic reflex testing was done at [facility] which was consistent with significant cardiovagal and cardiovascular adrenergic abnormalities. A QSART study was also done which favored preganglionic/central autonomic disorder.  She was evaluated at the [facility] dysautonomia clinic. She also had a thermoregulatory sweat test but cannot tolerate it for greater than 29 minutes. Evaluation at [place] in December 2021 showed mild orthostatic hypotension with mild to moderate impairment of the autonomic reflexes.     Pt presented to ER on 7/21/23 with lightheadedness and tingling in both hands and feeling like she was going to pass out with episodes of confusion. In ER, Creatinine 0.79, potassium 4.1, WBC 5.95, hemoglobin 15.0.  Chest ray was negative. We were consulted for syncope. It was felt her autonomic neuropathy symptoms got worse after her Cymbalta was increased to 90 mg in the morning and 60 in the evening.  This was switched to 60 mg twice a day. An ECHO showed normal LVSF and EF of 61-65%. During this admission she complained of vague chest pain and EKG and troponin were negative. It was felt pt was very anxious and t most of her symptoms were somatic symptoms. She was started on low dose clonazepam twice a day and felt much better. A Holter was placed at discharge. A Holter on 8/14/23 was normal.      Pt presented to ER on 10/24/23 with complaints of headache and chest pain. She reported he has had a headache for the past 5 days that he described as a dull ache in bilateral parietal region with associated photophobia and phonophobia.  Subsequent myocardial perfusion stress test during that hospitalization was normal.     She was last seen by Dr. Acuna in December 2024.  She reported stable orthostatic symptoms.  She reported wearing compression socks sometimes that she had purchased on [employer]. He wrote a prescription for thigh-high compression stockings.  He also encouraged her to increase her electrolyte beverages intake.    I have reviewed and updated as appropriate allergies, current medications, past family history, past medical history, past surgical history and problem list.    Review of Systems    Constitutional: Negative for fever, malaise/fatigue, weight gain and weight loss.   HENT:  Negative for congestion, hoarse voice and sore throat.    Eyes:  Negative for blurred vision and double vision.   Cardiovascular:  Negative for dyspnea on exertion, leg swelling, orthopnea, palpitations and syncope.   Respiratory:  Positive for shortness of breath. Negative for cough and wheezing.    Gastrointestinal:  Negative for abdominal pain, hematemesis, hematochezia and melena.   Genitourinary:  Negative for dysuria and hematuria.   Neurological:  Positive for dizziness and light-headedness. Negative for headaches and numbness.   Psychiatric/Behavioral:  Negative for depression. The patient is not nervous/anxious.          Current Outpatient Medications:     atorvastatin (LIPITOR) 20 MG tablet, Take 1 tablet by mouth Every Night., Disp: 30 tablet, Rfl: 11    clonazePAM (KlonoPIN) 0.5 MG tablet, Take 1 tablet by mouth Every 12 (Twelve) Hours for 14 days. (Patient taking differently: Take 0.5 tablets by mouth Every 12 (Twelve) Hours.), Disp: 28 tablet, Rfl: 0    DULoxetine (CYMBALTA) 60 MG capsule, Take 1 capsule by mouth 2 (Two) Times a Day., Disp: , Rfl:     fludrocortisone 0.1 MG tablet, Take 1 tablet by mouth 2 (Two) Times a Day., Disp: 180 tablet, Rfl: 3    fluticasone (FLONASE) 50 MCG/ACT nasal spray, Administer 2 sprays into the nostril(s) as directed by provider Daily., Disp: , Rfl:     lactulose (CHRONULAC) 10 GM/15ML solution, TAKE 15 ML BY MOUTH EVERY DAY FOR CONSTIPATION, Disp: , Rfl:     montelukast (SINGULAIR) 10 MG tablet, Take 1 tablet by mouth., Disp: , Rfl:     ondansetron ODT (ZOFRAN-ODT) 4 MG disintegrating tablet, As Needed., Disp: , Rfl:     SUMAtriptan (IMITREX) 50 MG tablet, Take 1 tablet by mouth Every 2 (Two) Hours As Needed for Migraine. Take one tablet at onset of headache. May repeat dose one time in 2 hours if headache not relieved., Disp: , Rfl:     vitamin D (ERGOCALCIFEROL) 1.25 MG  (61497 UT) capsule capsule, Take 1 capsule by mouth 1 (One) Time Per Week. , Disp: , Rfl:     levocetirizine (XYZAL) 5 MG tablet, Take 1 tablet by mouth Every Evening. (Patient not taking: Reported on 2025), Disp: , Rfl:     lidocaine (LMX) 4 % cream, Apply 1 application  topically to the appropriate area as directed 4 (Four) Times a Day As Needed for Mild Pain. (Patient not taking: Reported on 3/4/2025), Disp: 15 g, Rfl: 3    midodrine (PROAMATINE) 2.5 MG tablet, Take 1 tablet by mouth 2 (Two) Times a Day., Disp: 60 tablet, Rfl: 3    Xifaxan 550 MG tablet, Take 1 tablet by mouth Every 8 (Eight) Hours. (Patient not taking: Reported on 3/4/2025), Disp: , Rfl:     Past Medical History:   Diagnosis Date    Allergic     Anemia     Anxiety     Arthritis     Autonomic neuropathy     Cancer     cervical    Cervical disc disorder 2023    Cervical dysplasia     Conization in the past    Cervical mass     Chronic pain disorder -present    Chronic pain syndrome 2023    Depression     Difficulty walking     Eating disorder     Environmental allergies     Extremity pain -present    Fibromyalgia, primary     Fractures 11-25-15    Lost bal & broke foot in 2 places    GERD (gastroesophageal reflux disease)     Headache     Hyperlipidemia     Hypothyroidism     Kidney stone     Low back pain     Migraine     Neck pain 2019-present    Obesity     Orthostatic hypertension     Osteoarthritis     Diagnosed by local rheum    Peptic ulceration     Peripheral neuropathy     Shingles     Sleep apnea     Syncope     Tremor     Urinary tract infection     Visual impairment     glasses       Past Surgical History:   Procedure Laterality Date    CERVICAL CONIZATION      CERVICAL EPIDURAL N/A 2023    Procedure: CERVICAL EPIDURAL STEROID INJECTION C7-T1 #1  47005;  Surgeon: Rosie Cuevas MD;  Location: [facility] MAIN OR;  Service: Pain Management;  Laterality: N/A;     SECTION PRIMARY       CHOLECYSTECTOMY  2019    DILATATION AND CURETTAGE      EPIDURAL BLOCK   &     HIATAL HERNIA REPAIR  2023    MEDIAL BRANCH BLOCK Bilateral 2022    Procedure: LUMBAR MEDIAL BRANCH BLOCK Bilateral ~L3-S1;  Surgeon: Rosie Cuevas MD;  Location: [facility] MAIN [facility];  Service: Pain Management;  Laterality: Bilateral;    MEDIAL BRANCH BLOCK Bilateral 2022    Procedure: LUMBAR MEDIAL BRANCH BLOCK BILATERAL L3-S1 #2;  Surgeon: Rosie Cuevas MD;  Location: [facility];  Service: Pain Management;  Laterality: Bilateral;    NERVE SURGERY Left 2023    Procedure: LUMBAR RADIOFREQUENCY ABLATION LEFT L3-S1;  Surgeon: Rosie Cuevas MD;  Location: [facility];  Service: Pain Management;  Laterality: Left;    NERVE SURGERY Right 02/15/2023    Procedure: LUMBAR RADIOFREQUENCY ABLATION RIGHT L3-S1;  Surgeon: Rosie Cuevas MD;  Location: [facility];  Service: Pain Management;  Laterality: Right;    NISSEN FUNDOPLICATION LAPAROSCOPIC  2023    SACROILIAC JOINT INJECTION Left 10/23/2023    Procedure: SACROILIAC JOINT INJECTION LEFT 10629;  Surgeon: Rosie Cuevas MD;  Location: [facility];  Service: Pain Management;  Laterality: Left;       Family History   Problem Relation Age of Onset    Lymphoma Mother     Depression Mother     Migraines Mother     Cancer Mother         Non-hodgkins lymphoma    Hypertension Mother         Non-Hodkins Lymphoma/ at 47    Cancer Father         Lung & brain    Heart attack Father     Psoriasis Father     Alcohol abuse Father     Hypertension Father         Also had lung cancer metastasize to brain/ at 60    Aneurysm Brother     COPD Brother     Heart disease Brother     Heart attack Brother     Hypertension Brother         Hypertension, aneurysm near heart, 3 other aneurysms, dimentia    Migraines Son     Cancer Maternal Aunt     Cancer Maternal Uncle     Cancer Paternal Aunt     Heart disease Paternal Aunt     Cancer Paternal Uncle     Heart  "disease Paternal Uncle     Aneurysm Brother     Lung disease Brother     Alcohol abuse Brother     ADD / ADHD Son     Migraines Son        Social History     Tobacco Use    Smoking status: Every Day     Current packs/day: 0.00     Types: Cigarettes     Passive exposure: Yes    Smokeless tobacco: Never    Tobacco comments:     Socially/rarely   Vaping Use    Vaping status: Never Used   Substance Use Topics    Alcohol use: Yes     Comment: Social/Rarely    Drug use: Never         ECG 12 Lead    Date/Time: 7/22/2025 10:45 AM  Performed by: Belgica Duarte APRN    Authorized by: Belgica Duarte APRN  Comparison: compared with previous ECG   Rhythm: sinus rhythm  QRS axis: right             Objective:     Visit Vitals  /74 (BP Location: Right arm, Patient Position: Sitting, Cuff Size: Adult)   Pulse 82   Ht 160 cm (63\")   Wt 96.6 kg (213 lb)   BMI 37.73 kg/m²             Physical Exam  Constitutional:       Appearance: Normal appearance. She is obese.   HENT:      Head: Normocephalic.   Neck:      Vascular: No carotid bruit.   Cardiovascular:      Rate and Rhythm: Normal rate and regular rhythm.      Chest Wall: PMI is not displaced.      Pulses: Normal pulses.           Radial pulses are 2+ on the right side and 2+ on the left side.      Heart sounds: Normal heart sounds. No murmur heard.     No friction rub. No gallop.   Pulmonary:      Effort: Pulmonary effort is normal.      Breath sounds: Normal breath sounds.   Abdominal:      General: Bowel sounds are normal. There is no distension.      Palpations: Abdomen is soft.   Musculoskeletal:      Right lower leg: No edema.      Left lower leg: No edema.   Skin:     General: Skin is warm and dry.      Capillary Refill: Capillary refill takes less than 2 seconds.   Neurological:      Mental Status: She is alert and oriented to person, place, and time. Mental status is at baseline.   Psychiatric:         Mood and Affect: Mood normal.         Behavior: Behavior normal. "         Thought Content: Thought content normal.          Lab Review:   Lipid Panel          10/28/2024    17:36   Lipid Panel   Total Cholesterol 323       HDL Cholesterol 103       LDL Cholesterol  179.6          Details          This result is from an external source.                 Cardiac Procedures:       Assessment:         Diagnoses and all orders for this visit:    1. Dyslipidemia (Primary)    2. History of palpitations    3. High cholesterol    Other orders  -     midodrine (PROAMATINE) 2.5 MG tablet; Take 1 tablet by mouth 2 (Two) Times a Day.  Dispense: 60 tablet; Refill: 3  -     ECG 12 Lead            Plan:         Orthostatic hypotension: on florinef 0.1 mg BID. Symptoms are becoming worse with position changes. +orthostatics. Will start midodrine 2.5 mg BID and titrate up as needed. I have printed the order for the thigh high compression stockings for her to take to [facility]. Encouraged continued increased hydration.  Coronary artery calcification: noted on CT scan. Normal stress test. Intolerant to rosuvastatin and pravastatin. On atorvastatin 20 mg and seems to be doing ok with this. No anginal symptoms. EKG is stable with no ischemic changes. Continue medical therapy  HLD: on atorvastatin. Will await next lipid panel result and determine if we need to titrate up.    Thank you for allowing me to participate in this patient's care. Please call with any questions or concerns. Ms. Tomas will follow up with Dr. Acuna in 6 months.          Your medication list            Accurate as of July 21, 2025 11:59 PM. If you have any questions, ask your nurse or doctor.                START taking these medications        Instructions Last Dose Given Next Dose Due   midodrine 2.5 MG tablet  Commonly known as: PROAMATINE  Started by: Belgica Duarte      Take 1 tablet by mouth 2 (Two) Times a Day.              CHANGE how you take these medications        Instructions Last Dose Given Next Dose Due   clonazePAM 0.5 MG  tablet  Commonly known as: KlonoPIN  What changed: how much to take      Take 1 tablet by mouth Every 12 (Twelve) Hours for 14 days.              CONTINUE taking these medications        Instructions Last Dose Given Next Dose Due   atorvastatin 20 MG tablet  Commonly known as: LIPITOR      Take 1 tablet by mouth Every Night.       DULoxetine 60 MG capsule  Commonly known as: CYMBALTA      Take 1 capsule by mouth 2 (Two) Times a Day.       fludrocortisone 0.1 MG tablet      Take 1 tablet by mouth 2 (Two) Times a Day.       fluticasone 50 MCG/ACT nasal spray  Commonly known as: FLONASE      Administer 2 sprays into the nostril(s) as directed by provider Daily.       lactulose 10 GM/15ML solution  Commonly known as: CHRONULAC      TAKE 15 ML BY MOUTH EVERY DAY FOR CONSTIPATION       levocetirizine 5 MG tablet  Commonly known as: XYZAL      Take 1 tablet by mouth Every Evening.       lidocaine 4 % cream  Commonly known as: LMX      Apply 1 application  topically to the appropriate area as directed 4 (Four) Times a Day As Needed for Mild Pain.       montelukast 10 MG tablet  Commonly known as: SINGULAIR      Take 1 tablet by mouth.       ondansetron ODT 4 MG disintegrating tablet  Commonly known as: ZOFRAN-ODT      As Needed.       SUMAtriptan 50 MG tablet  Commonly known as: IMITREX      Take 1 tablet by mouth Every 2 (Two) Hours As Needed for Migraine. Take one tablet at onset of headache. May repeat dose one time in 2 hours if headache not relieved.       vitamin D 1.25 MG (75911 UT) capsule capsule  Commonly known as: ERGOCALCIFEROL      Take 1 capsule by mouth 1 (One) Time Per Week. Mondays       Xifaxan 550 MG tablet  Generic drug: riFAXIMin      Take 1 tablet by mouth Every 8 (Eight) Hours.                 Where to Get Your Medications        These medications [id number] - [place], [place] - [address] AT [facility] [phone] [facility] [phone]   [address]  [place], [place] [address]      Phone: [phone]   midodrine 2.5 MG tablet           STEFANY Willingham  07/22/25  3:12 PM EDT

## 2025-07-25 ENCOUNTER — TELEPHONE (OUTPATIENT)
Dept: CARDIOLOGY | Age: 65
End: 2025-07-25

## 2025-08-23 ENCOUNTER — HOSPITAL ENCOUNTER (EMERGENCY)
Facility: HOSPITAL | Age: 65
Discharge: HOME | End: 2025-08-23
Attending: EMERGENCY MEDICINE
Payer: MEDICARE

## 2025-08-23 ENCOUNTER — APPOINTMENT (OUTPATIENT)
Dept: CT IMAGING | Facility: HOSPITAL | Age: 65
End: 2025-08-23
Payer: MEDICARE

## (undated) DEVICE — EPIDURAL TRAY: Brand: MEDLINE INDUSTRIES, INC.

## (undated) DEVICE — PAD GRND E/S NT200IX RF/GEN W/CABL DISP

## (undated) DEVICE — NDL EPID TUOHY 18G 31/2IN

## (undated) DEVICE — GLV SURG TRIUMPH PF LTX 7.5 STRL

## (undated) DEVICE — Device: Brand: PORTEX

## (undated) DEVICE — Device

## (undated) DEVICE — GLV SURG TRIUMPH PF LTX 7 STRL

## (undated) DEVICE — NDL SPINE 22G 31/2IN BLK

## (undated) DEVICE — TOWEL,OR,DSP,ST,BLUE,STD,4/PK,20PK/CS: Brand: MEDLINE